# Patient Record
Sex: MALE | Race: BLACK OR AFRICAN AMERICAN | NOT HISPANIC OR LATINO | Employment: OTHER | URBAN - METROPOLITAN AREA
[De-identification: names, ages, dates, MRNs, and addresses within clinical notes are randomized per-mention and may not be internally consistent; named-entity substitution may affect disease eponyms.]

---

## 2017-03-14 ENCOUNTER — LAB VISIT (OUTPATIENT)
Dept: LAB | Facility: HOSPITAL | Age: 67
End: 2017-03-14
Attending: RADIOLOGY
Payer: MEDICARE

## 2017-03-14 DIAGNOSIS — C61 PROSTATE CANCER: ICD-10-CM

## 2017-03-14 LAB — COMPLEXED PSA SERPL-MCNC: 1.4 NG/ML

## 2017-03-14 PROCEDURE — 36415 COLL VENOUS BLD VENIPUNCTURE: CPT

## 2017-03-14 PROCEDURE — 84153 ASSAY OF PSA TOTAL: CPT

## 2017-03-21 ENCOUNTER — OFFICE VISIT (OUTPATIENT)
Dept: RADIATION ONCOLOGY | Facility: CLINIC | Age: 67
End: 2017-03-21
Payer: MEDICARE

## 2017-03-21 VITALS
SYSTOLIC BLOOD PRESSURE: 177 MMHG | HEART RATE: 70 BPM | RESPIRATION RATE: 16 BRPM | DIASTOLIC BLOOD PRESSURE: 84 MMHG | BODY MASS INDEX: 28.41 KG/M2 | WEIGHT: 181 LBS | HEIGHT: 67 IN

## 2017-03-21 DIAGNOSIS — C61 PROSTATE CANCER: Primary | ICD-10-CM

## 2017-03-21 PROCEDURE — 99999 PR PBB SHADOW E&M-EST. PATIENT-LVL III: CPT | Mod: PBBFAC,,, | Performed by: RADIOLOGY

## 2017-03-21 PROCEDURE — 1157F ADVNC CARE PLAN IN RCRD: CPT | Mod: S$GLB,,, | Performed by: RADIOLOGY

## 2017-03-21 PROCEDURE — 1159F MED LIST DOCD IN RCRD: CPT | Mod: S$GLB,,, | Performed by: RADIOLOGY

## 2017-03-21 PROCEDURE — 3079F DIAST BP 80-89 MM HG: CPT | Mod: S$GLB,,, | Performed by: RADIOLOGY

## 2017-03-21 PROCEDURE — 1126F AMNT PAIN NOTED NONE PRSNT: CPT | Mod: S$GLB,,, | Performed by: RADIOLOGY

## 2017-03-21 PROCEDURE — 1160F RVW MEDS BY RX/DR IN RCRD: CPT | Mod: S$GLB,,, | Performed by: RADIOLOGY

## 2017-03-21 PROCEDURE — 3077F SYST BP >= 140 MM HG: CPT | Mod: S$GLB,,, | Performed by: RADIOLOGY

## 2017-03-21 PROCEDURE — 99212 OFFICE O/P EST SF 10 MIN: CPT | Mod: S$GLB,,, | Performed by: RADIOLOGY

## 2017-03-21 RX ORDER — ASPIRIN 81 MG/1
81 TABLET ORAL DAILY
Status: ON HOLD | COMMUNITY
End: 2017-10-16

## 2017-03-21 RX ORDER — LISINOPRIL 40 MG/1
TABLET ORAL
Refills: 0 | Status: ON HOLD | COMMUNITY
Start: 2016-12-29 | End: 2017-10-16

## 2017-03-21 RX ORDER — RIVAROXABAN 15 MG/1
TABLET, FILM COATED ORAL
Refills: 0 | Status: ON HOLD | COMMUNITY
Start: 2016-12-23 | End: 2017-10-16

## 2017-03-21 NOTE — PROGRESS NOTES
Subjective:       Patient ID: Otis Salcido is a 67 y.o. male.    Chief Complaint: Prostate Cancer (4mo f/u;psa)    HPI Comments: This patient returns for follow up visit.      Mr. Salcido has a history of recurrent adenocarcinoma  of the prostate.  He is status post prostatectomy in May of 2011.  Pathology revealed a Latasha 7 (3+4) adenocarcinoma.  There was a tertiary Acra grade 5 noted.  There was no extra prostatic extension or seminal vesicle involvement.  Tumor was present at the distal urethral margin.  Recent PSA returned elevated at 0.17 ng/mL.  The patient was referred to our department for consideration of salvage radiation.  We subsequently enrolled the patient in a current RTOG trial 0534.  He was randomized to receive radiotherapy to the prostate bed.  He completed 68.4 Gy to the prostate bed on 11/12/13.  We have followed the patient since that time.  Initially his PSA decreased to < 0.01 ng/ml, but recently we have seen an increase in his PSA consistent with biochemical failure.  Today, the patient states he feels well.  No complaints of dysuria or hematuria.  Denies chest pain.     Review of Systems   Constitutional: Negative for activity change, appetite change, chills, fatigue and fever.   Respiratory: Negative for cough and shortness of breath.    Cardiovascular: Negative for chest pain and palpitations.   Gastrointestinal: Negative for abdominal pain, constipation and diarrhea.   Genitourinary: Negative for difficulty urinating, dysuria, frequency and hematuria.       Objective:      Physical Exam   Constitutional: He appears well-developed and well-nourished. No distress.   Abdominal: Soft. He exhibits no distension. There is no tenderness.   Genitourinary:   Genitourinary Comments: rectal - deferred.        PSA -  1.4 ng/ml  Assessment:       1. Prostate cancer        Plan:       Prostate cancer, status post salvage irradiation with increasing PSA.  His PSA is relatively stable from 1.3  in  November of 2016.  Discussed the results with the patient. Bone scan in May of 2016 was negative.  Will plan to continue active surveillance.  Plan follow up in 6 months with PSA.

## 2017-09-12 ENCOUNTER — LAB VISIT (OUTPATIENT)
Dept: LAB | Facility: HOSPITAL | Age: 67
End: 2017-09-12
Attending: RADIOLOGY
Payer: MEDICARE

## 2017-09-12 DIAGNOSIS — C61 PROSTATE CANCER: ICD-10-CM

## 2017-09-12 LAB — COMPLEXED PSA SERPL-MCNC: 3.4 NG/ML

## 2017-09-12 PROCEDURE — 36415 COLL VENOUS BLD VENIPUNCTURE: CPT

## 2017-09-12 PROCEDURE — 84153 ASSAY OF PSA TOTAL: CPT

## 2017-09-14 DIAGNOSIS — C61 PROSTATE CANCER: Primary | ICD-10-CM

## 2017-09-22 ENCOUNTER — TELEPHONE (OUTPATIENT)
Dept: RADIOLOGY | Facility: HOSPITAL | Age: 67
End: 2017-09-22

## 2017-09-25 ENCOUNTER — HOSPITAL ENCOUNTER (OUTPATIENT)
Dept: RADIOLOGY | Facility: HOSPITAL | Age: 67
Discharge: HOME | DRG: 064 | End: 2017-09-25
Attending: RADIOLOGY
Payer: MEDICARE

## 2017-09-25 DIAGNOSIS — C61 PROSTATE CANCER: ICD-10-CM

## 2017-09-25 PROCEDURE — A9503 TC99M MEDRONATE: HCPCS

## 2017-09-25 PROCEDURE — 78306 BONE IMAGING WHOLE BODY: CPT | Mod: 26,,, | Performed by: RADIOLOGY

## 2017-09-27 ENCOUNTER — HOSPITAL ENCOUNTER (INPATIENT)
Facility: HOSPITAL | Age: 67
LOS: 6 days | Discharge: NURSING, REHAB OR LONG-TERM CARE | DRG: 064 | End: 2017-10-03
Attending: EMERGENCY MEDICINE | Admitting: PSYCHIATRY & NEUROLOGY
Payer: MEDICARE

## 2017-09-27 DIAGNOSIS — I61.4 NONTRAUMATIC INTRACEREBRAL HEMORRHAGE OF CEREBELLUM, UNSPECIFIED LATERALITY: Primary | ICD-10-CM

## 2017-09-27 DIAGNOSIS — I63.9 STROKE: ICD-10-CM

## 2017-09-27 DIAGNOSIS — Z86.711 HISTORY OF PULMONARY EMBOLISM: ICD-10-CM

## 2017-09-27 DIAGNOSIS — I61.4 LEFT-SIDED NONTRAUMATIC INTRACEREBRAL HEMORRHAGE OF CEREBELLUM: ICD-10-CM

## 2017-09-27 DIAGNOSIS — I10 HYPERTENSION: ICD-10-CM

## 2017-09-27 DIAGNOSIS — I82.4Y2 ACUTE DEEP VEIN THROMBOSIS (DVT) OF PROXIMAL VEIN OF LEFT LOWER EXTREMITY: ICD-10-CM

## 2017-09-27 DIAGNOSIS — Z86.718 HISTORY OF DEEP VENOUS THROMBOSIS (DVT) OF DISTAL VEIN OF LEFT LOWER EXTREMITY: ICD-10-CM

## 2017-09-27 DIAGNOSIS — R53.83 FATIGUE: ICD-10-CM

## 2017-09-27 DIAGNOSIS — I10 HTN (HYPERTENSION): ICD-10-CM

## 2017-09-27 DIAGNOSIS — C61 PROSTATE CANCER: ICD-10-CM

## 2017-09-27 DIAGNOSIS — Z86.718 HISTORY OF BLOOD CLOTS: ICD-10-CM

## 2017-09-27 LAB
ABO + RH BLD: NORMAL
ALBUMIN SERPL BCP-MCNC: 3.6 G/DL
ALP SERPL-CCNC: 103 U/L
ALT SERPL W/O P-5'-P-CCNC: 15 U/L
ANION GAP SERPL CALC-SCNC: 11 MMOL/L
APTT BLDCRRT: 24.9 SEC
AST SERPL-CCNC: 20 U/L
BASOPHILS # BLD AUTO: 0.03 K/UL
BASOPHILS NFR BLD: 0.4 %
BILIRUB SERPL-MCNC: 0.4 MG/DL
BLD GP AB SCN CELLS X3 SERPL QL: NORMAL
BNP SERPL-MCNC: 11 PG/ML
BUN SERPL-MCNC: 7 MG/DL
CALCIUM SERPL-MCNC: 9.5 MG/DL
CHLORIDE SERPL-SCNC: 103 MMOL/L
CO2 SERPL-SCNC: 26 MMOL/L
CREAT SERPL-MCNC: 0.8 MG/DL
DIFFERENTIAL METHOD: ABNORMAL
EOSINOPHIL # BLD AUTO: 0 K/UL
EOSINOPHIL NFR BLD: 0.1 %
ERYTHROCYTE [DISTWIDTH] IN BLOOD BY AUTOMATED COUNT: 13.6 %
EST. GFR  (AFRICAN AMERICAN): >60 ML/MIN/1.73 M^2
EST. GFR  (NON AFRICAN AMERICAN): >60 ML/MIN/1.73 M^2
GLUCOSE SERPL-MCNC: 131 MG/DL
HCT VFR BLD AUTO: 41.6 %
HGB BLD-MCNC: 13.8 G/DL
INR PPP: 1
LYMPHOCYTES # BLD AUTO: 1.2 K/UL
LYMPHOCYTES NFR BLD: 16.5 %
MCH RBC QN AUTO: 28.8 PG
MCHC RBC AUTO-ENTMCNC: 33.2 G/DL
MCV RBC AUTO: 87 FL
MONOCYTES # BLD AUTO: 0.4 K/UL
MONOCYTES NFR BLD: 5.1 %
NEUTROPHILS # BLD AUTO: 5.6 K/UL
NEUTROPHILS NFR BLD: 77.5 %
PLATELET # BLD AUTO: 187 K/UL
PMV BLD AUTO: 12.1 FL
POTASSIUM SERPL-SCNC: 3.5 MMOL/L
PROT SERPL-MCNC: 8.4 G/DL
PROTHROMBIN TIME: 11 SEC
RBC # BLD AUTO: 4.8 M/UL
SODIUM SERPL-SCNC: 140 MMOL/L
TROPONIN I SERPL DL<=0.01 NG/ML-MCNC: <0.006 NG/ML
WBC # BLD AUTO: 7.23 K/UL

## 2017-09-27 PROCEDURE — 96366 THER/PROPH/DIAG IV INF ADDON: CPT

## 2017-09-27 PROCEDURE — 63600531 PHARM REV CODE 636 NO ALT 250 W HCPCS: Performed by: EMERGENCY MEDICINE

## 2017-09-27 PROCEDURE — 94761 N-INVAS EAR/PLS OXIMETRY MLT: CPT

## 2017-09-27 PROCEDURE — C9132 KCENTRA, PER I.U.: HCPCS | Performed by: EMERGENCY MEDICINE

## 2017-09-27 PROCEDURE — 96365 THER/PROPH/DIAG IV INF INIT: CPT

## 2017-09-27 PROCEDURE — 99223 1ST HOSP IP/OBS HIGH 75: CPT | Mod: ,,, | Performed by: NEUROLOGICAL SURGERY

## 2017-09-27 PROCEDURE — 99285 EMERGENCY DEPT VISIT HI MDM: CPT | Mod: 25

## 2017-09-27 PROCEDURE — G8997 SWALLOW GOAL STATUS: HCPCS | Mod: CH

## 2017-09-27 PROCEDURE — 97530 THERAPEUTIC ACTIVITIES: CPT

## 2017-09-27 PROCEDURE — 25000003 PHARM REV CODE 250: Performed by: NURSE PRACTITIONER

## 2017-09-27 PROCEDURE — 25000003 PHARM REV CODE 250: Performed by: EMERGENCY MEDICINE

## 2017-09-27 PROCEDURE — G8988 SELF CARE GOAL STATUS: HCPCS | Mod: CK

## 2017-09-27 PROCEDURE — 85730 THROMBOPLASTIN TIME PARTIAL: CPT

## 2017-09-27 PROCEDURE — 97166 OT EVAL MOD COMPLEX 45 MIN: CPT

## 2017-09-27 PROCEDURE — 63600175 PHARM REV CODE 636 W HCPCS: Performed by: NURSE PRACTITIONER

## 2017-09-27 PROCEDURE — 93010 ELECTROCARDIOGRAM REPORT: CPT | Mod: ,,, | Performed by: INTERNAL MEDICINE

## 2017-09-27 PROCEDURE — 86901 BLOOD TYPING SEROLOGIC RH(D): CPT

## 2017-09-27 PROCEDURE — 86900 BLOOD TYPING SEROLOGIC ABO: CPT

## 2017-09-27 PROCEDURE — 83880 ASSAY OF NATRIURETIC PEPTIDE: CPT

## 2017-09-27 PROCEDURE — 85025 COMPLETE CBC W/AUTO DIFF WBC: CPT

## 2017-09-27 PROCEDURE — 25000003 PHARM REV CODE 250: Performed by: PHYSICIAN ASSISTANT

## 2017-09-27 PROCEDURE — 80053 COMPREHEN METABOLIC PANEL: CPT

## 2017-09-27 PROCEDURE — 85610 PROTHROMBIN TIME: CPT

## 2017-09-27 PROCEDURE — 99223 1ST HOSP IP/OBS HIGH 75: CPT | Mod: AI,GC,, | Performed by: PSYCHIATRY & NEUROLOGY

## 2017-09-27 PROCEDURE — 99291 CRITICAL CARE FIRST HOUR: CPT | Mod: ,,, | Performed by: NURSE PRACTITIONER

## 2017-09-27 PROCEDURE — 92523 SPEECH SOUND LANG COMPREHEN: CPT

## 2017-09-27 PROCEDURE — 99285 EMERGENCY DEPT VISIT HI MDM: CPT | Mod: ,,, | Performed by: PHYSICIAN ASSISTANT

## 2017-09-27 PROCEDURE — G8987 SELF CARE CURRENT STATUS: HCPCS | Mod: CM

## 2017-09-27 PROCEDURE — 82962 GLUCOSE BLOOD TEST: CPT

## 2017-09-27 PROCEDURE — 20000000 HC ICU ROOM

## 2017-09-27 PROCEDURE — 84484 ASSAY OF TROPONIN QUANT: CPT

## 2017-09-27 PROCEDURE — 97802 MEDICAL NUTRITION INDIV IN: CPT

## 2017-09-27 PROCEDURE — G8996 SWALLOW CURRENT STATUS: HCPCS | Mod: CH

## 2017-09-27 PROCEDURE — 97162 PT EVAL MOD COMPLEX 30 MIN: CPT

## 2017-09-27 PROCEDURE — 99222 1ST HOSP IP/OBS MODERATE 55: CPT | Mod: ,,, | Performed by: NURSE PRACTITIONER

## 2017-09-27 PROCEDURE — 92610 EVALUATE SWALLOWING FUNCTION: CPT

## 2017-09-27 PROCEDURE — 93005 ELECTROCARDIOGRAM TRACING: CPT

## 2017-09-27 PROCEDURE — 96375 TX/PRO/DX INJ NEW DRUG ADDON: CPT

## 2017-09-27 RX ORDER — HYDRALAZINE HYDROCHLORIDE 50 MG/1
50 TABLET, FILM COATED ORAL EVERY 8 HOURS
Status: DISCONTINUED | OUTPATIENT
Start: 2017-09-27 | End: 2017-09-28

## 2017-09-27 RX ORDER — AMLODIPINE BESYLATE 10 MG/1
10 TABLET ORAL DAILY
Status: DISCONTINUED | OUTPATIENT
Start: 2017-09-27 | End: 2017-10-03 | Stop reason: HOSPADM

## 2017-09-27 RX ORDER — POLYETHYLENE GLYCOL 3350 17 G/17G
17 POWDER, FOR SOLUTION ORAL DAILY
Status: DISCONTINUED | OUTPATIENT
Start: 2017-09-27 | End: 2017-10-03 | Stop reason: HOSPADM

## 2017-09-27 RX ORDER — LABETALOL HYDROCHLORIDE 5 MG/ML
10 INJECTION, SOLUTION INTRAVENOUS EVERY 4 HOURS PRN
Status: DISCONTINUED | OUTPATIENT
Start: 2017-09-27 | End: 2017-09-27

## 2017-09-27 RX ORDER — AMOXICILLIN 250 MG
1 CAPSULE ORAL 2 TIMES DAILY
Status: DISCONTINUED | OUTPATIENT
Start: 2017-09-27 | End: 2017-10-03 | Stop reason: HOSPADM

## 2017-09-27 RX ORDER — ACETAMINOPHEN 325 MG/1
650 TABLET ORAL EVERY 4 HOURS PRN
Status: DISCONTINUED | OUTPATIENT
Start: 2017-09-27 | End: 2017-10-03 | Stop reason: HOSPADM

## 2017-09-27 RX ORDER — LABETALOL HYDROCHLORIDE 5 MG/ML
10 INJECTION, SOLUTION INTRAVENOUS
Status: COMPLETED | OUTPATIENT
Start: 2017-09-27 | End: 2017-09-27

## 2017-09-27 RX ORDER — HYDRALAZINE HYDROCHLORIDE 20 MG/ML
10 INJECTION INTRAMUSCULAR; INTRAVENOUS EVERY 4 HOURS PRN
Status: DISCONTINUED | OUTPATIENT
Start: 2017-09-27 | End: 2017-09-27

## 2017-09-27 RX ORDER — NICARDIPINE HYDROCHLORIDE 0.2 MG/ML
2.5 INJECTION INTRAVENOUS CONTINUOUS
Status: DISCONTINUED | OUTPATIENT
Start: 2017-09-27 | End: 2017-09-27

## 2017-09-27 RX ORDER — SODIUM CHLORIDE 9 MG/ML
INJECTION, SOLUTION INTRAVENOUS CONTINUOUS
Status: DISCONTINUED | OUTPATIENT
Start: 2017-09-27 | End: 2017-10-01

## 2017-09-27 RX ORDER — ONDANSETRON 2 MG/ML
4 INJECTION INTRAMUSCULAR; INTRAVENOUS
Status: DISPENSED | OUTPATIENT
Start: 2017-09-27 | End: 2017-09-27

## 2017-09-27 RX ORDER — LEVETIRACETAM 500 MG/1
500 TABLET ORAL 2 TIMES DAILY
Status: DISCONTINUED | OUTPATIENT
Start: 2017-09-27 | End: 2017-10-02

## 2017-09-27 RX ORDER — NICARDIPINE HYDROCHLORIDE 0.2 MG/ML
1 INJECTION INTRAVENOUS CONTINUOUS
Status: DISCONTINUED | OUTPATIENT
Start: 2017-09-27 | End: 2017-09-28

## 2017-09-27 RX ORDER — NICARDIPINE HYDROCHLORIDE 0.2 MG/ML
2.5 INJECTION INTRAVENOUS CONTINUOUS PRN
Status: DISCONTINUED | OUTPATIENT
Start: 2017-09-27 | End: 2017-09-27

## 2017-09-27 RX ORDER — LABETALOL HYDROCHLORIDE 5 MG/ML
10 INJECTION, SOLUTION INTRAVENOUS EVERY 4 HOURS PRN
Status: DISCONTINUED | OUTPATIENT
Start: 2017-09-27 | End: 2017-09-28

## 2017-09-27 RX ORDER — LISINOPRIL 20 MG/1
40 TABLET ORAL DAILY
Status: DISCONTINUED | OUTPATIENT
Start: 2017-09-27 | End: 2017-10-03 | Stop reason: HOSPADM

## 2017-09-27 RX ORDER — HYDRALAZINE HYDROCHLORIDE 20 MG/ML
10 INJECTION INTRAMUSCULAR; INTRAVENOUS EVERY 4 HOURS PRN
Status: DISCONTINUED | OUTPATIENT
Start: 2017-09-27 | End: 2017-09-28

## 2017-09-27 RX ORDER — ONDANSETRON 2 MG/ML
4 INJECTION INTRAMUSCULAR; INTRAVENOUS EVERY 12 HOURS PRN
Status: DISCONTINUED | OUTPATIENT
Start: 2017-09-27 | End: 2017-10-03 | Stop reason: HOSPADM

## 2017-09-27 RX ORDER — SODIUM CHLORIDE 0.9 % (FLUSH) 0.9 %
3 SYRINGE (ML) INJECTION EVERY 8 HOURS
Status: DISCONTINUED | OUTPATIENT
Start: 2017-09-27 | End: 2017-10-03 | Stop reason: HOSPADM

## 2017-09-27 RX ADMIN — AMLODIPINE BESYLATE 10 MG: 10 TABLET ORAL at 09:09

## 2017-09-27 RX ADMIN — HYDRALAZINE HYDROCHLORIDE 10 MG: 20 INJECTION INTRAMUSCULAR; INTRAVENOUS at 05:09

## 2017-09-27 RX ADMIN — STANDARDIZED SENNA CONCENTRATE AND DOCUSATE SODIUM 1 TABLET: 8.6; 5 TABLET, FILM COATED ORAL at 09:09

## 2017-09-27 RX ADMIN — NICARDIPINE HYDROCHLORIDE 2.5 MG/HR: 0.2 INJECTION, SOLUTION INTRAVENOUS at 06:09

## 2017-09-27 RX ADMIN — LEVETIRACETAM 500 MG: 500 TABLET ORAL at 09:09

## 2017-09-27 RX ADMIN — NICARDIPINE HYDROCHLORIDE 2.5 MG/HR: 0.2 INJECTION, SOLUTION INTRAVENOUS at 09:09

## 2017-09-27 RX ADMIN — HYDRALAZINE HYDROCHLORIDE 50 MG: 50 TABLET ORAL at 11:09

## 2017-09-27 RX ADMIN — LABETALOL HYDROCHLORIDE 10 MG: 5 INJECTION, SOLUTION INTRAVENOUS at 05:09

## 2017-09-27 RX ADMIN — LISINOPRIL 40 MG: 20 TABLET ORAL at 09:09

## 2017-09-27 RX ADMIN — ONDANSETRON 4 MG: 2 INJECTION INTRAMUSCULAR; INTRAVENOUS at 09:09

## 2017-09-27 RX ADMIN — SODIUM CHLORIDE: 0.9 INJECTION, SOLUTION INTRAVENOUS at 06:09

## 2017-09-27 RX ADMIN — Medication 3630 UNITS: at 05:09

## 2017-09-27 RX ADMIN — POLYETHYLENE GLYCOL 3350 17 G: 17 POWDER, FOR SOLUTION ORAL at 09:09

## 2017-09-27 RX ADMIN — DESMOPRESSIN ACETATE 21.78 MCG: 4 SOLUTION INTRAVENOUS at 11:09

## 2017-09-27 NOTE — HPI
66 yo M with PMHx of HTN, HLD, previous PEs on rivaroxaban presents to [facility] ED 09/27/17 with sudden onset of headache, vertigo, and nausea with multiple episodes of emesis.  CT head showing ICH approx 2.6 cm.  No falls despite vertigo.  No previous history of stroke.  Symptoms are persistent but have not worsened since onset.  Now on nicardipine gtt for BP control.

## 2017-09-27 NOTE — ASSESSMENT & PLAN NOTE
67M on xarelto for DVT with PMH HTN, prostate CA, HLD, TIAs (on ASA) who presents with cerebellar ICH (ICH score 1).    Neuro:  --continue medical management per NCC  --Q1h neuro checks  --Repeat head CT scan is stable   --HOB >30  --Call NSGY with any acute changes in neuro exam    CVS:  --SBP goals <160 on cardene (hydralazine, labetalol, amlodipine PRN)    Pulm:  --Aggressive pulmonary management  --Insentive spirometry & Duonebs PRN    Heme/onc/ID:  --CBC/CMP/Coags daily  --PCC to reverse Xarelto    F/E/GI  --Na 140-150  --Diet pending swallow eval  --PPI  --Laxatives for bowel care    Ppx:  -DVT ppx per NCC  -PPI    Dispo:  -PT/OT/OOB  -Speech eval  -Consult social work for dispo  - follow up in neurosurgery clinic in 1-2 monthswith MRI brain w/wo

## 2017-09-27 NOTE — PLAN OF CARE
Problem: SLP Goal  Goal: SLP Goal  Outcome: Ongoing (interventions implemented as appropriate)  Regular diet with thin liquids.    Maria El MA/[facility]-SLP  Speech Language Pathologist  Pager [phone]  9/27/2017

## 2017-09-27 NOTE — HOSPITAL COURSE
9/27/17:  Evaluated by therapy.  Bed mobility min-maxA.  Sit to stand maxA and transfers maxA.  UBD maxA and LBD totalA.  Passed bedside swallow evaluation.  SLP recommending regular diet and thin liquids.  Found to have cognitive-linguistic impairments.    9/28/17: Participating with therapy.  Bed mobility CGA-MaxA.  Sit to stand MaxA and transfers ModA.  UBD ModA and LBD MaxA.  9/30/17:  Participating with therapy.  Bed mobility SV-Candida.  Sit to stand CGA-Candida and transfers Candida.  Ambulated 95 ft Candida.  UBD Candida and LBD Candida.  10/2/17:  Progressing with therapy.  Bed mobility SV-CGA.  Sit to stand CGA-Candida and transfers CGA-Candida.  Ambulated 88 ft x 2 CGA-Candida.  UBD Candida.  Found to have mild cognitive-linguistic impairment.

## 2017-09-27 NOTE — ASSESSMENT & PLAN NOTE
68 yo M with PMHx HTN, HLD, PEs on rivaroxaban, and prostate cancer presents to [facility] ED 09/27/17 after sudden onset of headache, vertigo, and n/v approx 10-11 pm 09/26/17.  CT head showing 2.6 cm L cerebellar hemorrhage.  Neuro ICU and NSGY following.    Antithrombotics for secondary stroke prevention: Hold 2/2 ICH  Statins for secondary stroke prevention, HLD:  Lipid panel pending--atorvastatin 40 mg po qd  Aggressive risk factor modification: HTN, HLD, previous PEs on rivaroxaban, diet, exercise  Rehab Efforts:  PT/OT/SLP/PM&R  Diagnostics: Ordered/Pending--Hgb A1c, lipid panel, TSH, 2D Echo, MRI Brain, MRA Brain  VTE Prophylaxis: Holding 2/2 ICH--SCDs, early mobility

## 2017-09-27 NOTE — PROGRESS NOTES
Patient arrived back to unit from CT. Vitals stable. Awake, Alert, Oriented. Will continue to monitor.

## 2017-09-27 NOTE — PLAN OF CARE
SW spoke with Pt at bedside regarding therapy recs for rehab. Gave list of facilities. He will review and let this SW know asap.    Danielle Simon, JAIRO  Neurocritical Care   [facility]  [id number]

## 2017-09-27 NOTE — ASSESSMENT & PLAN NOTE
NSGY/Vascualr Surgery consult  Q1hr Neuro, SBP <140  Repeat CTH 1000  DDAVP for ASA hx  PCC for Xarelto Hx  Keppra bid ppx  PT/OT/SLP

## 2017-09-27 NOTE — CONSULTS
[facility]  Vascular Neurology  Comprehensive Stroke Center  Consult Note    Inpatient consult to Vascular (Stroke) Neurology  Consult performed by: ALFREDA HARP  Consult ordered by: NAEEM BRANHAM        Assessment/Plan:     66 yo M with PMHx of HTN, HLD, previous PEs on rivaroxaban presents to [facility] ED 09/27/17 with sudden onset of headache, vertigo, and nausea with multiple episodes of emesis.  CT head showing ICH approx 2.6 cm.  No falls despite vertigo.  No previous history of stroke.  Symptoms are persistent but have not worsened since onset.  Now on nicardipine gtt for BP control.    Nontraumatic intracerebral hemorrhage of cerebellum    66 yo M with PMHx HTN, HLD, PEs on rivaroxaban, and prostate cancer presents to [facility] ED 09/27/17 after sudden onset of headache, vertigo, and n/v approx 10-11 pm 09/26/17.  CT head showing 2.6 cm L cerebellar hemorrhage.  Neuro ICU and NSGY following.    Antithrombotics for secondary stroke prevention: Hold 2/2 ICH  Statins for secondary stroke prevention, HLD:  Lipid panel pending--atorvastatin 40 mg po qd  Aggressive risk factor modification: HTN, HLD, previous PEs on rivaroxaban, diet, exercise  Rehab Efforts:  PT/OT/SLP/PM&R  Diagnostics: Ordered/Pending--Hgb A1c, lipid panel, TSH, 2D Echo, MRI Brain, MRA Brain  VTE Prophylaxis: Holding 2/2 ICH--SCDs, early mobility      HTN (hypertension)    -Possible etiology of ICH  -Continue amlodipine 10 mg po qd  -Continue lisinopril 40 mg po qd  -Nicardipine gtt for BP control, goal SBP <140      High cholesterol    -Lipid panel pending  -Atorvastatin 40 mg po qd      History of blood clots    -Continue rivaroxaban within 2 weeks or earlier if ischemic etiology with hemorrhagic conversion vs HTN-related ICH      Prostate cancer    -Risk factor for increased clotting  -Etiology of cerebellar ICH likely hypertensive vs ischemic stroke with hemorrhagic transformation while on rivaroxaban.  Will check  2D Echo for cardioembolic etiology.     Thrombolysis Candidate? No  1. Contraindications: ICH  2. Warnings: Recent intracranial hemorrhage     Interventional Revascularization Candidate?  No; No large vessel occlusion, No; No ischemic penumbra and No; No significant neurological deficit    Research Candidate? No--> ICH    Subjective:     History of Present Illness:  68 yo M with PMHx of HTN, HLD, previous PEs on rivaroxaban presents to [place] ED 09/27/17 with sudden onset of headache, vertigo, and nausea with multiple episodes of emesis.  CT head showing ICH approx 2.6 cm.  No falls despite vertigo.  No previous history of stroke.  Symptoms are persistent but have not worsened since onset.  Now on nicardipine gtt for BP control.       Past Medical History:   Diagnosis Date    Allergy     Colon polyp     benign    Elevated PSA     Glaucoma     High cholesterol     History of blood clots     lungs    Hypertension     Kidney stone     Prostate cancer      Past Surgical History:   Procedure Laterality Date    COLON SURGERY      CYSTOSCOPY      KIDNEY STONE SURGERY      LITHOTRIPSY      NECK SURGERY      PROSTATE SURGERY       No family history on file.  Social History   Substance Use Topics    Smoking status: Current Every Day Smoker     Packs/day: 1.00     Years: 40.00     Types: Cigarettes    Smokeless tobacco: Never Used    Alcohol use Yes      Comment: social     Review of patient's allergies indicates:   Allergen Reactions    Iodine and iodide containing products Anaphylaxis     Medications: I have reviewed the current medication administration record.    Review of Systems   Constitutional: Negative for chills and fever.   Eyes: Negative for photophobia and visual disturbance.   Cardiovascular: Negative for chest pain and palpitations.   Gastrointestinal: Positive for nausea and vomiting. Negative for constipation and diarrhea.   Musculoskeletal: Negative for arthralgias and myalgias.   Skin:  Negative for rash and wound.   Neurological: Positive for dizziness and headaches. Negative for seizures, weakness and numbness.   Hematological: Negative for adenopathy. Does not bruise/bleed easily.   Psychiatric/Behavioral: Negative for agitation and confusion.     Objective:     Vital Signs (Most Recent):  Temp: 98.4 °F (36.9 °C) (09/27/17 0011)  Pulse: 71 (09/27/17 0602)  Resp: 18 (09/27/17 0552)  BP: (!) 154/78 (09/27/17 0602)  SpO2: 97 % (09/27/17 0602)    Vital Signs Range (Last 24H):  Temp:  [98.4 °F (36.9 °C)]   Pulse:  [63-84]   Resp:  [18-20]   BP: (154-194)/(77-91)   SpO2:  [95 %-99 %]     Physical Exam   Neurological: GCS eye subscore is 3 - to speech. GCS verbal subscore is 5 - oriented. GCS motor subscore is 6 - obeys commands.   General:  Well-developed, well-nourished, nad  HEENT:  NCAT, PERRLA, EOMI, oropharyngeal membranes non-erythematous/without exudate  Neck:  Supple, no palpable lad, no nuchal rigidity  Resp:  Symmetric expansion, no increased wob, CTAB  CVS:  RRR, no m/r/g.  No LE edema.  Extremities warm, well-perfused.  GI:  Abd soft, non-distended, non-tender to palpation, +BS  Neurological Exam:   Mental Status:  AAOx3.  Speech, thought content appropriate.  Recent, remote recall intact.  Cranial Nerves:  VFs intact to movement in all quadrants bilaterally.  PERRLA, EOMI.  Facial movement, sensation intact and symmetric.  Palate raises symmetrically, tongue protrudes midline.  SCM/Trapezius 5/5 bilaterally.  Motor:  Normal bulk and tone.  Strength diffusely 5/5.  Sensory:  Intact to light touch, temperature, and vibration at all extremities.  No length-dependent neuropathy detected.  Reflexes:  Biceps, brachioradialis, patellar, Achilles 2+.  No ankle clonus.  Bilateral downgoing toe.  Coordination:  Minor ataxia of L FNF, R FNF appears wnl.  DONALDO, HTS intact bilaterally.   Gait:  Deferred 2/2 fall risk, in ED, pt having leg cramping  NIH Stroke Scale:  Interval: baseline (upon  arrival/admit)  Level of Consciousness: 0 - alert  LOC Questions: 0 - answers both correctly  LOC Commands: 0 - performs both correctly  Best Gaze: 0 - normal  Visual: 0 - no visual loss  Facial Palsy: 0 - normal  Motor Left Arm: 0 - no drift  Motor Right Arm: 0 - no drift  Motor Left Le - no drift  Motor Right Le - no drift  Limb Ataxia: 0 - absent  Sensory: 0 - normal  Best Language: 0 - no aphasia  Dysarthria: 0 - normal articulation  Extinction and Inattention: 0 - no neglect  NIH Stroke Scale Total: 0  [place] Coma Scale:  Best Eye Response: 3 - to speech  Best Motor Response: 6 - obeys commands  Best Verbal Response: 5 - oriented  Gisela Coma Scale Total: 14  Modified Alicia Scale:   Timeline: Prior to symptoms onset  Modified [place] Score: 0 - no symptoms        Laboratory:  CMP:   Recent Labs  Lab 17   CALCIUM 9.5   ALBUMIN 3.6   PROT 8.4      K 3.5   CO2 26      BUN 7*   CREATININE 0.8   ALKPHOS 103   ALT 15   AST 20   BILITOT 0.4     CBC:   Recent Labs  Lab 17   WBC 7.23   RBC 4.80   HGB 13.8*   HCT 41.6      MCV 87   MCH 28.8   MCHC 33.2     Lipid Panel: PENDING  Coagulation:   Recent Labs  Lab 17   INR 1.0   APTT 24.9     Hgb A1C: PENDING  TSH: PENDING    Diagnostic Results:  17 CT head w/o contrast:  1.  2.6 cm acute hemorrhage within the superior aspect of the left cerebellar hemisphere and vermis with surrounding edema and mild associated mass effect upon the fourth ventricle.  No hydrocephalus.  This hemorrhage is likely hypertensive in etiology.    Findings identified at 4:44 AM and discussed with BOUBACAR Vogel in the emergency department at 4:45 AM.  Neurocritical care consultation recommended.  2.  Probable mild chronic microvascular ischemic changes.  Small remote lacunar infarcts in the bilateral thalami.        Ernestina Flowers MD  Comprehensive Stroke Center  Department of Vascular Neurology   [facility]

## 2017-09-27 NOTE — HPI
67-year-old male presents to the ER with his significant other for evaluation of headache, nausea, vomiting, diaphoresis.  Symptoms were acute onset last night around 10 or 11 PM.  He has never had a headache like this before in his life.  He admits to nausea with multiple episodes of emesis at home and in the waiting room.  The patient's significant other reports that throughout the evening he has not appeared himself.  He has been very lethargic and required assistance more than normal.  Patient denies any chest pain shortness of breath fever, chills

## 2017-09-27 NOTE — PLAN OF CARE
Problem: Physical Therapy Goal  Goal: Physical Therapy Goal  PT goals until 10/6/17     1. Pt supine to sit with CGA-not met  2. Pt sit to supine with CGA-not met  3. Pt sit to stand with or without RW as needed for safety with CGA-not met  4. Pt to perform gait 100ft with or without RW as needed for safety with CGA.-not met  5. Pt to transfer bed to/from bedside chair with CGA.-not met  6. Pt to perform B LE exs in sitting x 20 reps with handout.-not met    Outcome: Ongoing (interventions implemented as appropriate)  Pt's goals set and pt will benefit from skilled PT services to work towards improved functional mobility including: bed mobility, transfers, and gait.   Philly Severino, PT  9/27/2017

## 2017-09-27 NOTE — RESEARCH
I found Otis Salcido eligible to participate in the  study of microRNA expression in patients with intracerebral hemorrhage and status epilepticus. I spoke to patient on September 27, 2017 at the bedside. I explained the purpose and procedure of the study as well as the risks/benefits and cost/payment. He understood what information would be collected, why, and who could view the data. He understood that participation was voluntary and that he could withdraw at any time. He was interested in participating, and I reviewed the consent form with him. All questions were answered. He decided to participate, and he signed the consent form at time. I gave him a signed copy and placed a copy into his chart.     Radha Garcia   Associate  [facility]- Neurosurgery

## 2017-09-27 NOTE — SUBJECTIVE & OBJECTIVE
Past Medical History:   Diagnosis Date    Allergy     Colon polyp     benign    Elevated PSA     Glaucoma     High cholesterol     History of blood clots     lungs    Hypertension     Kidney stone     Prostate cancer      Past Surgical History:   Procedure Laterality Date    COLON SURGERY      CYSTOSCOPY      KIDNEY STONE SURGERY      LITHOTRIPSY      NECK SURGERY      PROSTATE SURGERY       No family history on file.  Social History   Substance Use Topics    Smoking status: Current Every Day Smoker     Packs/day: 1.00     Years: 40.00     Types: Cigarettes    Smokeless tobacco: Never Used    Alcohol use Yes      Comment: social     Review of patient's allergies indicates:   Allergen Reactions    Iodine and iodide containing products Anaphylaxis     Medications: I have reviewed the current medication administration record.    Review of Systems   Constitutional: Negative for chills and fever.   Eyes: Negative for photophobia and visual disturbance.   Cardiovascular: Negative for chest pain and palpitations.   Gastrointestinal: Positive for nausea and vomiting. Negative for constipation and diarrhea.   Musculoskeletal: Negative for arthralgias and myalgias.   Skin: Negative for rash and wound.   Neurological: Positive for dizziness and headaches. Negative for seizures, weakness and numbness.   Hematological: Negative for adenopathy. Does not bruise/bleed easily.   Psychiatric/Behavioral: Negative for agitation and confusion.     Objective:     Vital Signs (Most Recent):  Temp: 98.4 °F (36.9 °C) (09/27/17 0011)  Pulse: 71 (09/27/17 0602)  Resp: 18 (09/27/17 0552)  BP: (!) 154/78 (09/27/17 0602)  SpO2: 97 % (09/27/17 0602)    Vital Signs Range (Last 24H):  Temp:  [98.4 °F (36.9 °C)]   Pulse:  [63-84]   Resp:  [18-20]   BP: (154-194)/(77-91)   SpO2:  [95 %-99 %]     Physical Exam   Neurological: GCS eye subscore is 3 - to speech. GCS verbal subscore is 5 - oriented. GCS motor subscore is 6 - obeys  commands.   General:  Well-developed, well-nourished, nad  HEENT:  NCAT, PERRLA, EOMI, oropharyngeal membranes non-erythematous/without exudate  Neck:  Supple, no palpable lad, no nuchal rigidity  Resp:  Symmetric expansion, no increased wob, CTAB  CVS:  RRR, no m/r/g.  No LE edema.  Extremities warm, well-perfused.  GI:  Abd soft, non-distended, non-tender to palpation, +BS  Neurological Exam:   Mental Status:  AAOx3.  Speech, thought content appropriate.  Recent, remote recall intact.  Cranial Nerves:  VFs intact to movement in all quadrants bilaterally.  PERRLA, EOMI.  Facial movement, sensation intact and symmetric.  Palate raises symmetrically, tongue protrudes midline.  SCM/Trapezius 5/5 bilaterally.  Motor:  Normal bulk and tone.  Strength diffusely 5/5.  Sensory:  Intact to light touch, temperature, and vibration at all extremities.  No length-dependent neuropathy detected.  Reflexes:  Biceps, brachioradialis, patellar, Achilles 2+.  No ankle clonus.  Bilateral downgoing toe.  Coordination:  Minor ataxia of L FNF, R FNF appears wnl.  DONALDO, HTS intact bilaterally.   Gait:  Deferred 2/2 fall risk, in ED, pt having leg cramping  NIH Stroke Scale:  Interval: baseline (upon arrival/admit)  Level of Consciousness: 0 - alert  LOC Questions: 0 - answers both correctly  LOC Commands: 0 - performs both correctly  Best Gaze: 0 - normal  Visual: 0 - no visual loss  Facial Palsy: 0 - normal  Motor Left Arm: 0 - no drift  Motor Right Arm: 0 - no drift  Motor Left Le - no drift  Motor Right Le - no drift  Limb Ataxia: 0 - absent  Sensory: 0 - normal  Best Language: 0 - no aphasia  Dysarthria: 0 - normal articulation  Extinction and Inattention: 0 - no neglect  NIH Stroke Scale Total: 0  Gisela Coma Scale:  Best Eye Response: 3 - to speech  Best Motor Response: 6 - obeys commands  Best Verbal Response: 5 - oriented  [place] Coma Scale Total: 14  Modified [place] Scale:   Timeline: Prior to symptoms onset  Modified  [place] Score: 0 - no symptoms        Laboratory:  CMP:   Recent Labs  Lab 09/27/17  0421   CALCIUM 9.5   ALBUMIN 3.6   PROT 8.4      K 3.5   CO2 26      BUN 7*   CREATININE 0.8   ALKPHOS 103   ALT 15   AST 20   BILITOT 0.4     CBC:   Recent Labs  Lab 09/27/17 0421   WBC 7.23   RBC 4.80   HGB 13.8*   HCT 41.6      MCV 87   MCH 28.8   MCHC 33.2     Lipid Panel: PENDING  Coagulation:   Recent Labs  Lab 09/27/17 0421   INR 1.0   APTT 24.9     Hgb A1C: PENDING  TSH: PENDING    Diagnostic Results:  09/27/17 CT head w/o contrast:  1.  2.6 cm acute hemorrhage within the superior aspect of the left cerebellar hemisphere and vermis with surrounding edema and mild associated mass effect upon the fourth ventricle.  No hydrocephalus.  This hemorrhage is likely hypertensive in etiology.    Findings identified at 4:44 AM and discussed with BOUBACAR Vgoel in the emergency department at 4:45 AM.  Neurocritical care consultation recommended.  2.  Probable mild chronic microvascular ischemic changes.  Small remote lacunar infarcts in the bilateral thalami.

## 2017-09-27 NOTE — CONSULTS
[facility]  Physical Medicine & Rehab  Consult Note    Patient Name: Otis Salcido  MRN: [id number]  Admission Date: 9/27/2017  Hospital Length of Stay: 0 days  Attending Physician: Apollo Murphy MD   Primary Care Provider: Garrett Hicks MD     Inpatient consult to Physical Medicine & Rehabilitation  Consult performed by: Emily Cat NP  Consult requested by:  Apollo Murphy MD    Collaborating Physician: Cary Gonzalez MD  Reason for Consult:  assess rehabilitation needs    Consults  Subjective:     Principal Problem: Stroke    HPI: Otis Salcido is a 67-year-old male with PMHx of HTN, HLD, prostate cancer s/p resection, blood clots (on Xarelto), and glaucoma.  Patient [place] [facility] [place] for severe headache with associated N/V, vertigo, and diaphoresis.  On arrival, found to be hypertensive and started on Cardene infusion.  CTH showed L cerebellar ICH.  Neurosurgery and vascular neurology following.  No surgical intervention necessary at this time.  Etiology likely hypertensive vs ischemic stroke with hemorrhagic transformation while on rivaroxaban.    Functional History: Patient lives in [place], alone, in a 1st floor apartment without steps to enter.  Prior to admission, he was independent with ADLs, including driving, and mobility.  He is right handed.  DME: none.    Hospital Course: 9/27/17:  Evaluated by therapy.  Bed mobility min-maxA.  Sit to stand maxA and transfers maxA.  UBD maxA and LBD totalA.  Passed bedside swallow evaluation.  SLP recommending regular diet and thin liquids.  Found to have cognitive-linguistic impairments.      Past Medical History:   Diagnosis Date    Allergy     Colon polyp     benign    Elevated PSA     Glaucoma     High cholesterol     History of blood clots     lungs    Hypertension     Kidney stone     Prostate cancer      Past Surgical History:   Procedure Laterality Date    COLON SURGERY      CYSTOSCOPY      KIDNEY STONE  SURGERY      LITHOTRIPSY      NECK SURGERY      PROSTATE SURGERY       Review of patient's allergies indicates:   Allergen Reactions    Iodine and iodide containing products Anaphylaxis       Scheduled Medications:    amlodipine  10 mg Oral Daily    hydrALAZINE  50 mg Oral Q8H    levetiracetam  500 mg Oral BID    lisinopril  40 mg Oral Daily    ondansetron  4 mg Intravenous ED 1 Time    polyethylene glycol  17 g Oral Daily    senna-docusate 8.6-50 mg  1 tablet Oral BID    sodium chloride 0.9%  3 mL Intravenous Q8H       PRN Medications: acetaminophen, hydrALAZINE, labetalol, ondansetron    Family History     None        Social History Main Topics    Smoking status: Current Every Day Smoker     Packs/day: 1.00     Years: 40.00     Types: Cigarettes    Smokeless tobacco: Never Used    Alcohol use Yes      Comment: social    Drug use: No    Sexual activity: Yes     Partners: Female     Review of Systems   Constitutional: Negative for chills, fatigue and fever.   HENT: Negative for drooling, hearing loss, trouble swallowing and voice change.    Eyes: Negative for pain and visual disturbance.   Respiratory: Negative for cough, shortness of breath and wheezing.    Cardiovascular: Negative for chest pain and palpitations.   Gastrointestinal: Negative for abdominal pain, nausea and vomiting.   Genitourinary: Negative for difficulty urinating and flank pain.   Musculoskeletal: Positive for gait problem and myalgias. Negative for arthralgias, back pain and neck pain.   Skin: Negative for rash and wound.   Neurological: Positive for weakness. Negative for dizziness, numbness and headaches.   Psychiatric/Behavioral: Negative for agitation and hallucinations. The patient is not nervous/anxious.      Objective:     Vital Signs (Most Recent):  Temp: 98.7 °F (37.1 °C) (09/27/17 1301)  Pulse: 62 (09/27/17 1301)  Resp: 16 (09/27/17 1301)  BP: (!) 131/97 (09/27/17 1301)  SpO2: 98 % (09/27/17 1301)    Vital Signs (24h  Range):  Temp:  [98.1 °F (36.7 °C)-98.7 °F (37.1 °C)] 98.7 °F (37.1 °C)  Pulse:  [62-84] 62  Resp:  [15-22] 16  SpO2:  [95 %-99 %] 98 %  BP: (131-194)/(60-97) 131/97     Body mass index is 28.35 kg/m².    Physical Exam   Constitutional: He is oriented to person, place, and time. He appears well-developed and well-nourished. No distress.   HENT:   Head: Normocephalic and atraumatic.   Right Ear: External ear normal.   Left Ear: External ear normal.   Nose: Nose normal.   Eyes: Right eye exhibits no discharge. Left eye exhibits no discharge. No scleral icterus.   Neck: Normal range of motion.   Cardiovascular: Normal rate, regular rhythm and intact distal pulses.    Pulmonary/Chest: Effort normal. No respiratory distress. He has no wheezes.   Abdominal: Soft. He exhibits no distension. There is no tenderness.   Musculoskeletal: Normal range of motion. He exhibits no edema or tenderness.   Neurological: He is alert and oriented to person, place, and time.   -  Mental Status:  AAOx3.  Follows commands.  Answers correct age and .  Recent and remote memory intact.  -  Speech and language:  no aphasia or dysarthria.    -  Vision:  no hemianopsia or ptosis.    -  Facial movement (CN VII): symmetrical   -  Coordination:  Finger to nose exam:  RUE normal, LUE dysmetria.  -  Motor:  No pronator drift. RUE: 5/5.  LUE: 4/5.  -  Tone:  Normal.   -  Sensory:  Intact to light touch and pin prick.   Skin: Skin is warm and dry. No rash noted.   Psychiatric: He has a normal mood and affect. His behavior is normal. Thought content normal. Cognition and memory are impaired.   Vitals reviewed.    Diagnostic Results:   Labs: Reviewed  US: Reviewed  CT: Reviewed    Assessment/Plan:     Nontraumatic intracerebral hemorrhage of cerebellum    -  Presented with severe headache with associated N/V, vertigo, and diaphoresis.    -  CTH showed L cerebellar ICH.    -  Found to be hypertensive and started on Cardene infusion.    -  Neurosurgery  and vascular neurology following.  -  Etiology likely hypertensive vs ischemic stroke with hemorrhagic transformation while on rivaroxaban.    Functional status: see hospital course  Cognitive/Speech/Language status:  Cognitive-Linguistic Impairment.  Nutrition/Swallow Status:  Passed bedside swallow evaluation.  SLP recommending regular diet and thin liquids.    Recommendations  -  Encourage mobility, OOB in chair at least 3 hours per day, and early ambulation as appropriate   -  PT/OT evaluate and treat  -  SLP speech and cognitive evaluate and treat  -  Monitor sleep disturbances and establish consistent sleep-wake cycle  -  Monitor for bowel and bladder dysfunction  -  Monitor for shoulder pain and subluxation  -  Monitor for spasticity  -  Monitor for and prevent skin breakdown and pressure ulcers  · Early mobility, repositioning/weight shifting every 20-30 minutes when sitting, turn patient every 2 hours, proper mattress/overlay and chair cushioning, pressure relief/heel protector boots  -  DVT prophylaxis:  ILANA, SCD  -  Reviewed discharge options (IP rehab, SNF, HH therapy, and OP therapy)        Acute deep vein thrombosis (DVT) of proximal vein of left lower extremity    -  On home rivaroxaban, holding for ICH  -  Repeat BLE US for history of DVT        History of blood clots    -  On home rivaroxaban, holding for ICH        Patient with rehab goals.  Day 1 of admission, not ready for discharge.  Will follow and discuss with rehab team for further rehab recommendations.    Thank you for your consult.     JF Knapp  Department of Physical Medicine & Rehab  [facility]

## 2017-09-27 NOTE — SUBJECTIVE & OBJECTIVE
Past Medical History:   Diagnosis Date    Allergy     Colon polyp     benign    Elevated PSA     Glaucoma     High cholesterol     History of blood clots     lungs    Hypertension     Kidney stone     Prostate cancer      Past Surgical History:   Procedure Laterality Date    COLON SURGERY      CYSTOSCOPY      KIDNEY STONE SURGERY      LITHOTRIPSY      NECK SURGERY      PROSTATE SURGERY        No current facility-administered medications on file prior to encounter.      Current Outpatient Prescriptions on File Prior to Encounter   Medication Sig Dispense Refill    amlodipine (NORVASC) 10 MG tablet Take 1 tablet (10 mg total) by mouth once daily. 30 tablet 11    aspirin (ECOTRIN) 81 MG EC tablet Take 81 mg by mouth once daily.      lisinopril (PRINIVIL,ZESTRIL) 40 MG tablet TK 1 T PO  D  0    NASONEX 50 mcg/actuation nasal spray       sildenafil (VIAGRA) 100 MG tablet Take 1 tablet (100 mg total) by mouth daily as needed for Erectile Dysfunction. 6 tablet 5    XARELTO 15 mg Tab TK 1 T PO  QPM  0      Allergies: Iodine and iodide containing products    No family history on file.  Social History   Substance Use Topics    Smoking status: Current Every Day Smoker     Packs/day: 1.00     Years: 40.00     Types: Cigarettes    Smokeless tobacco: Never Used    Alcohol use Yes      Comment: social     Review of Systems   Respiratory: Negative for apnea, cough, choking, chest tightness, shortness of breath, wheezing and stridor.    Cardiovascular: Negative for chest pain, palpitations and leg swelling.   Gastrointestinal: Positive for nausea and vomiting. Negative for abdominal distention and abdominal pain.   Neurological: Positive for headaches.     Objective:     Vitals:  Temp: 98.4 °F (36.9 °C) (09/27/17 0011)  Pulse: 69 (09/27/17 0617)  Resp: (!) 22 (09/27/17 0617)  BP: (!) 159/72 (09/27/17 0617)  SpO2: 96 % (09/27/17 0617)    Temp:  [98.4 °F (36.9 °C)] 98.4 °F (36.9 °C)  Pulse:  [63-84] 69  Resp:   [18-22] 22  SpO2:  [95 %-99 %] 96 %  BP: (154-194)/(72-91) 159/72              No intake/output data recorded.    Physical Exam   Cardiovascular: Normal rate, regular rhythm, normal heart sounds and intact distal pulses.    Pulmonary/Chest: Effort normal and breath sounds normal.   Abdominal: Soft. Bowel sounds are normal.   Neurological:   E4 V5 M6  AAO x 4  PERRLA, EOMI  HERMAN, follows commands  Dysmetria left upper  Generalized weakness  LUE 4/5  RUE 4/5  LLE 4/5  RLE 4/5   Skin: Skin is warm and dry.       Today I personally reviewed pertinent medications, lines/drains/airways, imaging, cardiology, lab results, microbiology results,

## 2017-09-27 NOTE — PT/OT/SLP EVAL
Physical Therapy  Evaluation    Otis Salcido   MRN: [id number]   Admitting Diagnosis: <principal problem not specified>    PT Received On: 09/27/17  PT Start Time: 1328     PT Stop Time: 1352    PT Total Time (min): 24 min       Billable Minutes:  Evaluation 14 and Therapeutic Activity 10   Moderate complexity eval   Lives alone  Impaired sitting and standing balance  Requires moderate assist for bed mobility  Requires moderate assist for transfers and gait  Decreased coordination in L LE    Diagnosis: <principal problem not specified>  Pt admitted with HA, N/V, and diaphoresis. CT shows L cerebellar acute hemorrhage    Past Medical History:   Diagnosis Date    Allergy     Colon polyp     benign    Elevated PSA     Glaucoma     High cholesterol     History of blood clots     lungs    Hypertension     Kidney stone     Prostate cancer       Past Surgical History:   Procedure Laterality Date    COLON SURGERY      CYSTOSCOPY      KIDNEY STONE SURGERY      LITHOTRIPSY      NECK SURGERY      PROSTATE SURGERY     Referring physician: Ezio  Date referred to PT: 9/27/17  General Precautions: Standard, aspiration, fall  Orthopedic Precautions: N/A   Braces: N/A       Do you have any cultural, spiritual, Rastafarian conflicts, given your current situation?: none noted    Patient History:  Lives With: alone  Living Arrangements: apartment  Home Accessibility: other (see comments) (no steps to enter)  Home Layout: Able to live on 1st floor  Equipment Currently Used at Home: none    Previous Level of Function:  Ambulation Skills: independent  Transfer Skills: independent  ADL Skills: independent    Subjective:  Communicated with nurse prior to session.  Pt c/o being tired from not getting any sleep last night and tests all day    Pain/Comfort  Pain Rating 1: 0/10  Pain Rating Post-Intervention 1: 0/10    Objective:   Patient found with: peripheral IV, telemetry, pulse ox (continuous), oxygen     Cognitive  Exam:  Oriented to: Person, Place, Time and Situation    Follows Commands/attention: Follows two-step commands (slow to respond to commands/questions)  Communication: clear/fluent  Safety awareness/insight to disability: intact    Physical Exam:  Postural examination/scapula alignment: Rounded shoulder, Head forward and Abnormal trunk flexion    Sensation:   Intact  light/touch B LE pt inconsistent with naming body parts during testing     Lower Extremity Range of Motion:  Right Lower Extremity: WFL  Left Lower Extremity: WFL    Lower Extremity Strength:  Right Lower Extremity: WFL  Left Lower Extremity: Deficits: hip flex 4-/5; knee flex/ext 4/5; ankle DF 4+/5     Functional Mobility:  Bed Mobility:  Rolling/Turning Right: Minimum assistance  Supine to Sit: Moderate Assistance  Sit to Supine: Contact Guard Assistance, With siderail    Transfers:  Sit <> Stand Assistance: Maximum Assistance  Sit <> Stand Assistive Device: No Assistive Device (HHA)    Gait:   Gait Distance: 3 sidesteps to the R with HHA with moderate assist; pt with decreased clearance of L>R foot during swing phase and with flexed trunk  Assistance 1: Moderate assistance  Gait Assistive Device: Hand held assist  Gait Deviation(s): decreased gina, decreased step length, forward lean, decreased toe-to-floor clearance, decreased weight-shifting ability    Balance:   Static Sit: POOR+: Needs MINIMAL assist to maintain  Dynamic Sit: FAIR: Cannot move trunk without losing balance  Static Stand: POOR: Needs MODERATE assist to maintain    Therapeutic Activities and Exercises:  Pt sat on the EOB ~ 10 min with minimal assist due to R sided instability prior to transfer    AM-PAC 6 CLICK MOBILITY  How much help from another person does this patient currently need?   1 = Unable, Total/Dependent Assistance  2 = A lot, Maximum/Moderate Assistance  3 = A little, Minimum/Contact Guard/Supervision  4 = None, Modified [place]/Independent    Turning over in  bed (including adjusting bedclothes, sheets and blankets)?: 3  Sitting down on and standing up from a chair with arms (e.g., wheelchair, bedside commode, etc.): 2  Moving from lying on back to sitting on the side of the bed?: 2  Moving to and from a bed to a chair (including a wheelchair)?: 2  Need to walk in hospital room?: 2  Climbing 3-5 steps with a railing?: 1  Total Score: 12     AM-PAC Raw Score CMS G-Code Modifier Level of Impairment Assistance   6 % Total / Unable   7 - 9 CM 80 - 100% Maximal Assist   10 - 14 CL 60 - 80% Moderate Assist   15 - 19 CK 40 - 60% Moderate Assist   20 - 22 CJ 20 - 40% Minimal Assist   23 CI 1-20% SBA / CGA   24 CH 0% Independent/ Mod I     Patient left supine with all lines intact, call button in reach and nurse notified.    Assessment:   Otis Salcido is a 67 y.o. male with a medical diagnosis of <principal problem not specified> and presents with difficulty with functional mobility due to weakness, instability, fatigue, and decreased coordination. Pt can benefit from continued therapy to work towards improved mobility.    Rehab identified problem list/impairments: Rehab identified problem list/impairments: weakness, impaired endurance, impaired self care skills, gait instability, impaired functional mobilty, impaired coordination, impaired cognition    Rehab potential is good.    Activity tolerance: Good    Discharge recommendations: Discharge Facility/Level Of Care Needs: rehabilitation facility     Barriers to discharge: Barriers to Discharge: Decreased caregiver support (lives alone)    Equipment recommendations: Equipment Needed After Discharge:  (TBD as pt progresses with mobility)     GOALS:    Physical Therapy Goals        Problem: Physical Therapy Goal    Goal Priority Disciplines Outcome Goal Variances Interventions   Physical Therapy Goal     PT/OT, PT Ongoing (interventions implemented as appropriate)     Description:  PT goals until 10/6/17     1. Pt  supine to sit with CGA-not met  2. Pt sit to supine with CGA-not met  3. Pt sit to stand with or without RW as needed for safety with CGA-not met  4. Pt to perform gait 100ft with or without RW as needed for safety with CGA.-not met  5. Pt to transfer bed to/from bedside chair with CGA.-not met  6. Pt to perform B LE exs in sitting x 20 reps with handout.-not met                  PLAN:    Patient to be seen 5 x/week to address the above listed problems via gait training, therapeutic activities, therapeutic exercises, neuromuscular re-education  Plan of Care expires: 10/27/17  Plan of Care reviewed with: patient, spouse    Philly ANGUS Severino, PT  09/27/2017

## 2017-09-27 NOTE — ED TRIAGE NOTES
67 year old male presents to the ED via EMS c/o weakness, dizziness, diaphoresis and headache. Wife reports that patient called her into room and stated that he felt dizzy and weak and was sweating then began to vomit

## 2017-09-27 NOTE — HOSPITAL COURSE
68 yo M with PMHx of HTN, HLD, previous PEs on rivaroxaban presents to [facility] ED 09/27/17 with sudden onset of headache, vertigo, and nausea with multiple episodes of emesis.  CT head showing ICH approx 2.6 cm.  No falls despite vertigo.  No previous history of stroke.  Symptoms are persistent but have not worsened since onset.  Now on nicardipine gtt for BP control.    9/28/17 patient neurologically stable, off cardene, MRI pending, plans for stepdown  9/29 Pt neurologically stable. Plans for stepdown. Therapy recommending inpatient rehab.  10/2 Dizziness resolved per pt. Still with LUE ataxia. PT recommends rehab, pt's insurance approving SNF  10/3/14 Patient states his left sided ataxia is improving. Walking with PT. Medically ready for discharge and accepted to [facility] today

## 2017-09-27 NOTE — ASSESSMENT & PLAN NOTE
-  Presented with severe headache with associated N/V, vertigo, and diaphoresis.    -  CTH showed L cerebellar ICH.    -  Found to be hypertensive and started on Cardene infusion.    -  Neurosurgery and vascular neurology following.  -  Etiology likely hypertensive vs ischemic stroke with hemorrhagic transformation while on rivaroxaban.    Functional status: see hospital course  Cognitive/Speech/Language status:  Cognitive-Linguistic Impairment.  Nutrition/Swallow Status:  Passed bedside swallow evaluation.  SLP recommending regular diet and thin liquids.    Recommendations  -  Encourage mobility, OOB in chair at least 3 hours per day, and early ambulation as appropriate   -  PT/OT evaluate and treat  -  SLP speech and cognitive evaluate and treat  -  Monitor sleep disturbances and establish consistent sleep-wake cycle  -  Monitor for bowel and bladder dysfunction  -  Monitor for shoulder pain and subluxation  -  Monitor for spasticity  -  Monitor for and prevent skin breakdown and pressure ulcers  · Early mobility, repositioning/weight shifting every 20-30 minutes when sitting, turn patient every 2 hours, proper mattress/overlay and chair cushioning, pressure relief/heel protector boots  -  DVT prophylaxis:  ILANA, SCD  -  Reviewed discharge options (IP rehab, SNF, HH therapy, and OP therapy)

## 2017-09-27 NOTE — HOSPITAL COURSE
9/27: Patient presented to the ED with Cerebellar ICH with ICH score 1, was admitted to [facility] for ICH MAMADOU ortiz following  9/28: No acute events overnight. Repeat CT scans stable. Neuro exam stable.   9/29: NAEON.  9/30: NAEON. Neuro-stable

## 2017-09-27 NOTE — ASSESSMENT & PLAN NOTE
67M on xarelto for DVT with PMH HTN, prostate CA, HLD, TIAs (on ASA) who presents with cerebellar ICH.    Neuro:  --Admit to NCC  --Q1h neuro checks  --re-CTH at 10AM   -Consider MRI if suspicious  --HOB >30  --Call NSGY with any acute changes in neuro exam    CVS:  --SBP goals <160 on cardene (hydralazine, labetalol, amlodipine PRN)    Pulm:  --Aggressive pulmonary management  --Insentive spirometry & Duonebs PRN    Heme/onc/ID:  --CBC/CMP/Coags daily  --PCC to reverse Xarelto    F/E/GI  --Na 140-150  --Diet pending swallow eval  --PPI  --Laxatives for bowel care    Ppx:  -DVT ppx per NCC  -PPI    Dispo:  -PT/OT/OOB  -Speech eval  -Consult social work for dispo

## 2017-09-27 NOTE — HPI
Otis Salcido is a 67-year-old male with PMHx of HTN, HLD, prostate cancer s/p resection, blood clots (on Xarelto), and glaucoma.  Patient presented to [facility] on 9/27/17 for severe headache with associated N/V, vertigo, and diaphoresis.  On arrival, found to be hypertensive and started on Cardene infusion.  CTH showed L cerebellar ICH.  Neurosurgery and vascular neurology following.  No surgical intervention necessary at this time.  Etiology likely hypertensive vs ischemic stroke with hemorrhagic transformation while on rivaroxaban. US LLE negative for DVT. MRI from 9/28 revealed acute L ICH. May performed contrast MRI.     Functional History: Patient lives in [place], alone, in a 1st floor apartment without steps to enter.  Prior to admission, he was independent with ADLs, including driving, and mobility.  He is right handed.  DME: none.

## 2017-09-27 NOTE — HOSPITAL COURSE
9/27 Admit NCC, s/p left cerebellar hemorrhage  9/28: KAYY, neuro exam stable, ready for step down   9/29: KAYY, patient waiting for room to open on step down unit

## 2017-09-27 NOTE — HPI
67M with PMH of DVTs (on Xarelto), TIA (on ASA 81), HTN, HLD who presented to the ED for HA with nausea and vomiting. On presentation he demonstrated dysmetria L>RUEs, but was otherwise intact, AOx3, GCS15. CTH shows cerebellar ICH with ICH score of 1. He will be admitted to the [facility] for further management with NSGY following.

## 2017-09-27 NOTE — ASSESSMENT & PLAN NOTE
-Possible etiology of ICH  -Continue amlodipine 10 mg po qd  -Continue lisinopril 40 mg po qd  -Nicardipine gtt for BP control, goal SBP <140

## 2017-09-27 NOTE — PT/OT/SLP EVAL
"Occupational Therapy  Evaluation/Treatment    Otis Salcido   MRN: [id number]   Admitting Diagnosis:     OT Date of Treatment: 09/27/17   OT Start Time: 0554  OT Stop Time: 0617  OT Total Time (min): 23 min    Billable Minutes:  Evaluation 15  Therapeutic Activity 8    Diagnosis:ICH    Past Medical History:   Diagnosis Date    Allergy     Colon polyp     benign    Elevated PSA     Glaucoma     High cholesterol     History of blood clots     lungs    Hypertension     Kidney stone     Prostate cancer       Past Surgical History:   Procedure Laterality Date    COLON SURGERY      CYSTOSCOPY      KIDNEY STONE SURGERY      LITHOTRIPSY      NECK SURGERY      PROSTATE SURGERY         Referring physician: Ezio  Date referred to OT: 9/27  General Precautions: Standard, aspiration, fall, NPO  Orthopedic Precautions: N/A  Braces: N/A    Do you have any cultural, spiritual, Congregational conflicts, given your current situation?: Presybeterian     Patient History:   Prior level of function:   Per patient:  Patient resides alone in [place] in first floor apt with no steps to enter.  PTA patient independent with ADLs including driving.  Currently owns no DME.  Patient is right handed.  Retired:   at a plant.  Hobbies:  sports, TV.  Roles/Responsibilities:  father, grandfather, cooking, grocery shopping, managing finances.     Subjective:  Communicated with nurse prior to session.  Patient:  "I lost my balance in my legs and couldn't walk."  Girlfriend:  "He got dizzy."  Pain/Comfort  Pain Rating 1: 0/10  Pain Rating Post-Intervention 1: 0/10    Objective:  Patient found with: peripheral IV, telemetry  Girlfriend present.    Cognitive Exam:  Oriented to: Person, Place and Time  Follows Commands/attention: Follows one-step commands  Communication: delayed responses  Memory: needs further assessment  Coping skills/emotional control: Appropriate to situation    Visual/perceptual:  Wears reading glasses    Physical " Exam:  Postural examination/scapula alignment: Rounded shoulder  Skin integrity: Visible skin intact  Edema: None noted     Sensation:   Intact    Upper Extremity Range of Motion:  Right Upper Extremity: WNL  Left Upper Extremity: WNL    Upper Extremity Strength:  Right Upper Extremity: WNL  Left Upper Extremity: WNL    Fine motor coordination:   Left UE dysmetria    Functional Mobility:  Bed Mobility:  Rolling/Turning to Left: Minimum assistance  Rolling/Turning Right: Minimum assistance  Scooting/Bridging: Moderate Assistance  Supine to Sit: Maximum Assistance (from the left side)  Sit to Supine: Maximum Assistance    Transfers:  Sit <> Stand Assistance: Maximum Assistance  Sit <> Stand Assistive Device: No Assistive Device  Bed <> Chair Technique: Stand Pivot  Bed <> Chair Transfer Assistance: Maximum Assistance    Activities of Daily Living:  Feeding Level of Assistance:  (NPO)  UE Dressing Level of Assistance: Maximum assistance (while seated EOB)  LE Dressing Level of Assistance: Total assistance (while seated EOB)  Grooming Position: Standing  Grooming Level of Assistance: Maximum assistance     Additional Treatment:   Patient education provided for stroke warning signs, prevention guidelines and personal risk factors.  Patienty verbalizing understanding via teach back method.   Patient education provided on role of OT and need for rehab upon discharge.  Patient verbalizing understanding via teach back method. Patient and family instructed on need to call for assistance for toileting needs and when getting up.  Continued education, patient/ family training recommended.  Patient alert and oriented x 3; able to follow 4/4 one step commands.  Patient attentive and interactive throughout the session.  Patient able to identify 3/3 body parts.  Able to name 3/3 objects.  Able to sequence 7/7 days of the week and 12/12 months of the year.  Patient's functional status and disposition recommendation discussed with  "nsg, patient and MD.  White board updated in patient's room.  OT asked if there were any other questions; patient/ family had no further questions.       AM-PAC 6 CLICK ADL  How much help from another person does this patient currently need?  1 = Unable, Total/Dependent Assistance  2 = A lot, Maximum/Moderate Assistance  3 = A little, Minimum/Contact Guard/Supervision  4 = None, Modified [place]/Independent    Putting on and taking off regular lower body clothing? : 1  Bathing (including washing, rinsing, drying)?: 2  Toileting, which includes using toilet, bedpan, or urinal? : 2  Putting on and taking off regular upper body clothing?: 2  Taking care of personal grooming such as brushing teeth?: 2  Eating meals?: 1  Total Score: 10    AM-PAC Raw Score CMS "G-Code Modifier Level of Impairment Assistance   6 % Total / Unable   7 - 9 CM 80 - 100% Maximal Assist   10-14 CL 60 - 80% Moderate Assist   15 - 19 CK 40 - 60% Moderate Assist   20 - 22 CJ 20 - 40% Minimal Assist   23 CI 1-20% SBA / CGA   24 CH 0% Independent/ Mod I       Patient left supine with all lines intact and call button in reach    Assessment:  Otis Salcido is a 67 y.o. male with a medical diagnosis of ICH and presents with performance deficits of physical skills including impaired balance, mobility, dexterity, fine motor coordination, gross motor coordination, and endurance; demonstrating performance deficits of cognitive skills including impaired  attention, problem solving, sequencing and memory all resulting in inability organizing occupational performance in a timely and safe manner; demonstrating performance deficits of psychosocial skills including impairments of interpersonal interactions and coping strategies which are skills necessary to successfully and appropriately participate in everyday tasks and social situations.  These performance deficits have resulted in activity limitations including but not limited to:   bed " mobility, transfers, ascending/ descending stairs, walking short and long distances, walking around obstacles, transitional movement patterns (kneeling, bending); eating, upper body dressing, lower body dressing, brushing teeth, toileting, bathing, carrying objects, typing, writing, reading, balancing checkbook, shopping, meal preparation, fishing and hunting.   Patient's role as father, grandfather, and independent caretaker for self has been affected. Patient will benefit from skilled OT services to maximize level of independence with self-care skills and functional mobility.  Will benefit from Rehab.    Pt evaluation falls under moderate complexity for evaluation coding due to identification of 3-5 performance deficits noted as stated above. Eval required Min/Mod assistance to complete on this date and detailed assessment(s) were utilized. Moreover, an expanded review of history and occupational profile obtained with additional review of cognitive, physical and psychosocial hx.     Rehab identified problem list/impairments: Rehab identified problem list/impairments: weakness, impaired self care skills, impaired balance, decreased coordination, decreased safety awareness, decreased ROM, impaired endurance, impaired functional mobilty, impaired coordination, gait instability, impaired cognition    Rehab potential is good.    Activity tolerance: Good    Discharge recommendations: Discharge Facility/Level Of Care Needs: rehabilitation facility (pending progress or deterioration in medical status)     Barriers to discharge: Barriers to Discharge: Inaccessible home environment, Decreased caregiver support    Equipment recommendations:  (TBD closer to D/C)     GOALS:    Occupational Therapy Goals        Problem: Occupational Therapy Goal    Goal Priority Disciplines Outcome Interventions   Occupational Therapy Goal     OT, PT/OT     Description:  Goals set 9/27 to be addressed for 7 days with expiration date,  10/4:  Patient will increase functional independence with ADLs by performing:    Patient will demonstrate rolling to the right with SBA assist.  Not met   Patient will demonstrate rolling to the left with SBA assist.   Not met  Patient will demonstrate supine -sit with SBA  assist.   Not met  Patient will demonstrate stand pivot transfers with min assist.   Not met  Patient will demonstrate grooming while standing with min assist.   Not met  Patient will demonstrate upper body dressing with SBA assist while seated EOB.   Not met  Patient will demonstrate lower body dressing with min assist while seated EOB.   Not met  Patient will demonstrate toileting with min assist.   Not met  Patient's family / caregiver will demonstrate independence and safety with assisting patient with self-care skills and functional mobility.     Not met  Patient and/or patient's family will verbalize understanding of stroke prevention guidelines, personal risk factors and stroke warning signs via teachback method.  Not met                           PLAN:  Patient to be seen 6 x/week to address the above listed problems via self-care/home management, neuromuscular re-education, therapeutic activities, therapeutic exercises, sensory integration  Plan of Care expires: 10/26/17  Plan of Care reviewed with: patient, significant other    OT G-codes  Functional Assessment Tool Used: FIM  Score: 2  Functional Limitation: Self care (Grooming standing)  Self Care Current Status (): CM  Self Care Goal Status (): JASMINA Foote  09/27/2017

## 2017-09-27 NOTE — SUBJECTIVE & OBJECTIVE
Past Medical History:   Diagnosis Date    Allergy     Colon polyp     benign    Elevated PSA     Glaucoma     High cholesterol     History of blood clots     lungs    Hypertension     Kidney stone     Prostate cancer      Past Surgical History:   Procedure Laterality Date    COLON SURGERY      CYSTOSCOPY      KIDNEY STONE SURGERY      LITHOTRIPSY      NECK SURGERY      PROSTATE SURGERY       Review of patient's allergies indicates:   Allergen Reactions    Iodine and iodide containing products Anaphylaxis       Scheduled Medications:    amlodipine  10 mg Oral Daily    hydrALAZINE  50 mg Oral Q8H    levetiracetam  500 mg Oral BID    lisinopril  40 mg Oral Daily    ondansetron  4 mg Intravenous ED 1 Time    polyethylene glycol  17 g Oral Daily    senna-docusate 8.6-50 mg  1 tablet Oral BID    sodium chloride 0.9%  3 mL Intravenous Q8H       PRN Medications: acetaminophen, hydrALAZINE, labetalol, ondansetron    Family History     None        Social History Main Topics    Smoking status: Current Every Day Smoker     Packs/day: 1.00     Years: 40.00     Types: Cigarettes    Smokeless tobacco: Never Used    Alcohol use Yes      Comment: social    Drug use: No    Sexual activity: Yes     Partners: Female     Review of Systems   Constitutional: Negative for chills, fatigue and fever.   HENT: Negative for drooling, hearing loss, trouble swallowing and voice change.    Eyes: Negative for pain and visual disturbance.   Respiratory: Negative for cough, shortness of breath and wheezing.    Cardiovascular: Negative for chest pain and palpitations.   Gastrointestinal: Negative for abdominal pain, nausea and vomiting.   Genitourinary: Negative for difficulty urinating and flank pain.   Musculoskeletal: Positive for gait problem and myalgias. Negative for arthralgias, back pain and neck pain.   Skin: Negative for rash and wound.   Neurological: Positive for weakness. Negative for dizziness, numbness and  headaches.   Psychiatric/Behavioral: Negative for agitation and hallucinations. The patient is not nervous/anxious.      Objective:     Vital Signs (Most Recent):  Temp: 98.7 °F (37.1 °C) (17 1301)  Pulse: 62 (17 1301)  Resp: 16 (17 1301)  BP: (!) 131/97 (17 1301)  SpO2: 98 % (17 1301)    Vital Signs (24h Range):  Temp:  [98.1 °F (36.7 °C)-98.7 °F (37.1 °C)] 98.7 °F (37.1 °C)  Pulse:  [62-84] 62  Resp:  [15-22] 16  SpO2:  [95 %-99 %] 98 %  BP: (131-194)/(60-97) 131/97     Body mass index is 28.35 kg/m².    Physical Exam   Constitutional: He is oriented to person, place, and time. He appears well-developed and well-nourished. No distress.   HENT:   Head: Normocephalic and atraumatic.   Right Ear: External ear normal.   Left Ear: External ear normal.   Nose: Nose normal.   Eyes: Right eye exhibits no discharge. Left eye exhibits no discharge. No scleral icterus.   Neck: Normal range of motion.   Cardiovascular: Normal rate, regular rhythm and intact distal pulses.    Pulmonary/Chest: Effort normal. No respiratory distress. He has no wheezes.   Abdominal: Soft. He exhibits no distension. There is no tenderness.   Musculoskeletal: Normal range of motion. He exhibits no edema or tenderness.   Neurological: He is alert and oriented to person, place, and time.   -  Mental Status:  AAOx3.  Follows commands.  Answers correct age and .  Recent and remote memory intact.  -  Speech and language:  no aphasia or dysarthria.    -  Vision:  no hemianopsia or ptosis.    -  Facial movement (CN VII): symmetrical   -  Coordination:  Finger to nose exam:  RUE normal, LUE dysmetria.  -  Motor:  No pronator drift. RUE: 5/5.  LUE: 4/5.  -  Tone:  Normal.   -  Sensory:  Intact to light touch and pin prick.   Skin: Skin is warm and dry. No rash noted.   Psychiatric: He has a normal mood and affect. His behavior is normal. Thought content normal. Cognition and memory are impaired.   Vitals  reviewed.    NEUROLOGICAL EXAMINATION:     MENTAL STATUS   Oriented to person, place, and time.       Diagnostic Results:   Labs: Reviewed  US: Reviewed  CT: Reviewed

## 2017-09-27 NOTE — PLAN OF CARE
09/27/17 1545   Discharge Assessment   Assessment Type Discharge Planning Assessment   Confirmed/corrected address and phone number on facesheet? Yes   Assessment information obtained from? Patient   Expected Length of Stay (days) 3   Communicated expected length of stay with patient/caregiver yes   Prior to hospitilization cognitive status: Alert/Oriented   Prior to hospitalization functional status: Independent   Current cognitive status: Alert/Oriented   Current Functional Status: Needs Assistance   Lives With alone   Able to Return to Prior Arrangements unable to determine at this time (comments)   Is patient able to care for self after discharge? Unable to determine at this time (comments)   Who are your caregiver(s) and their phone number(s)? Juan Salcido (son) [phone]   Patient's perception of discharge disposition rehab facility   Readmission Within The Last 30 Days no previous admission in last 30 days   Patient currently being followed by outpatient case management? No   Patient currently receives any other outside agency services? No   Equipment Currently Used at Home none   Do you have any problems affording any of your prescribed medications? No   Is the patient taking medications as prescribed? yes   Does the patient have transportation home? No   Does the patient receive services at the [facility]? No   Discharge Plan A Rehab   Discharge Plan B Home Health;Home   Patient/Family In Agreement With Plan yes           Discharge/ My Health Packet Folder Given to patient/family:      Yes        PCP:    Patient stated that he changed his PCP yesterday and cannot remember the name of his new MD      Pharmacy:    [facility] [id number] AT [address]  [place] [address]  Phone: [phone] Fax: [phone]        Emergency Contacts:  Extended Emergency Contact Information  Primary Emergency Contact:  Joselo Castaneda   [place]  Mobile Phone: [phone]  Relation: Son      Insurance:  Payor: [employer] / Plan: [employer] / Product Type: Medicare Advantage /       Kristie Sanders RN, CCRN-K, [facility]  Neuro-Critical Care   X [phone]

## 2017-09-27 NOTE — CONSULTS
[facility]  Adult Nutrition  Consult Note    SUMMARY     Recommendations    Recommendation/Intervention:   1. Continue Cardiac diet   2. If po intake <50% of meals, add Boost TID to increase caloric intake.     RD to monitor.      Goals: Pt to tolerate >50% of meals.  Nutrition Goal Status: new  Communication of RD Recs: reviewed with RN    Reason for Assessment    Reason for Assessment: nurse/nurse practitioner consult  Diagnosis: hemorrhage  Relevent Medical History: HTN, HLD, prostate cancer         General Information Comments: Pt reports 12lb weight loss over past few weeks related to decreased intake from stress after his wife's passing. However, per chart review, pt with stable weight over last 2 years.    Nutrition Discharge Planning: adequate po intake for optimal nutrition    Nutrition Prescription Ordered    Current Diet Order: Cardiac     Evaluation of Received Nutrients/Fluid Intake        IV Fluid (mL): 1800      % Intake of Estimated Energy Needs: Other: BESSIE. No meal consumed at time of visit.  % Meal Intake: Other: BESSIE. No meal consumed at time of visit.    Nutrition Risk Screen     Nutrition Risk Screen: no indicators present    Nutrition/Diet History       Typical Food/Fluid Intake: Lunch at bedside untouched. RD assisted pt with meal set up and encouraged pt to increase intake at home with high caloric beverages if energy low. Pt verbalized understanding.  Food Preferences: No Denominational/cultural food preferences identifed at this time.         Factors Affecting Nutritional Intake: other (see comments) (None at this time)                Labs/Tests/Procedures/Meds       Pertinent Labs Reviewed: reviewed  Pertinent Labs Comments: noted  Pertinent Medications Reviewed: reviewed  Pertinent Medications Comments: IVF, cardene    Physical Findings    Overall Physical Appearance: nourished, lethargic, on oxygen therapy        Skin: intact    Anthropometrics    Temp: 98.7 °F (37.1  "°C)     Height: 5' 7" (170.2 cm)  Weight Method: Bed Scale  Weight: 82.1 kg (181 lb)  Ideal Body Weight (IBW), Male: 148 lb     % Ideal Body Weight, Male (lb): 122.3 lb     BMI (Calculated): 28.4  BMI Grade: 25 - 29.9 - overweight                            Estimated/Assessed Needs    Weight Used For Calorie Calculations: 82.1 kg (181 lb)      Energy Calorie Requirements (kcal): 1930  Energy Need Method: Dodge-St Jeor (PAL 1.25)        RMR (Dodge-St. Jeor Equation): 1544.62        Weight Used For Protein Calculations: 82.1 kg (181 lb)  Protein Requirements: 82-99g (1.0-1.2g/kg)    Fluid Requirements (mL): 1mL/kcal or per MD           RDA Method (mL): 1930               Assessment and Plan    No nutrition diagnosis at this time.      Monitor and Evaluation    Food and Nutrient Intake: energy intake, food and beverage intake  Food and Nutrient Adminstration: diet order        Anthropometric Measurements: weight, body mass index, weight change  Biochemical Data, Medical Tests and Procedures: electrolyte and renal panel, gastrointestinal profile, glucose/endocrine profile, inflammatory profile, lipid profile  Nutrition-Focused Physical Findings: overall appearance    Nutrition Risk    Level of Risk: other (see comments) (f/u 1x/week)    Nutrition Follow-Up    RD Follow-up?: Yes          "

## 2017-09-27 NOTE — H&P
[facility]  Neurocritical Care  History & Physical    Admit Date: 9/27/2017  Service Date: 09/27/2017  Length of Stay: 0    Subjective:     Chief Complaint: <principal problem not specified>    History of Present Illness: 67-year-old male presents to the ER with his significant other for evaluation of headache, nausea, vomiting, diaphoresis.  Symptoms were acute onset last night around 10 or 11 PM.  He has never had a headache like this before in his life.  He admits to nausea with multiple episodes of emesis at home and in the waiting room.  The patient's significant other reports that throughout the evening he has not appeared himself.  He has been very lethargic and required assistance more than normal.  Patient denies any chest pain shortness of breath fever, chills    Past Medical History:   Diagnosis Date    Allergy     Colon polyp     benign    Elevated PSA     Glaucoma     High cholesterol     History of blood clots     lungs    Hypertension     Kidney stone     Prostate cancer      Past Surgical History:   Procedure Laterality Date    COLON SURGERY      CYSTOSCOPY      KIDNEY STONE SURGERY      LITHOTRIPSY      NECK SURGERY      PROSTATE SURGERY        No current facility-administered medications on file prior to encounter.      Current Outpatient Prescriptions on File Prior to Encounter   Medication Sig Dispense Refill    amlodipine (NORVASC) 10 MG tablet Take 1 tablet (10 mg total) by mouth once daily. 30 tablet 11    aspirin (ECOTRIN) 81 MG EC tablet Take 81 mg by mouth once daily.      lisinopril (PRINIVIL,ZESTRIL) 40 MG tablet TK 1 T PO  D  0    NASONEX 50 mcg/actuation nasal spray       sildenafil (VIAGRA) 100 MG tablet Take 1 tablet (100 mg total) by mouth daily as needed for Erectile Dysfunction. 6 tablet 5    XARELTO 15 mg Tab TK 1 T PO  QPM  0      Allergies: Iodine and iodide containing products    No family history on file.  Social History   Substance Use  Topics    Smoking status: Current Every Day Smoker     Packs/day: 1.00     Years: 40.00     Types: Cigarettes    Smokeless tobacco: Never Used    Alcohol use Yes      Comment: social     Review of Systems   Respiratory: Negative for apnea, cough, choking, chest tightness, shortness of breath, wheezing and stridor.    Cardiovascular: Negative for chest pain, palpitations and leg swelling.   Gastrointestinal: Positive for nausea and vomiting. Negative for abdominal distention and abdominal pain.   Neurological: Positive for headaches.     Objective:     Vitals:  Temp: 98.4 °F (36.9 °C) (09/27/17 0011)  Pulse: 69 (09/27/17 0617)  Resp: (!) 22 (09/27/17 0617)  BP: (!) 159/72 (09/27/17 0617)  SpO2: 96 % (09/27/17 0617)    Temp:  [98.4 °F (36.9 °C)] 98.4 °F (36.9 °C)  Pulse:  [63-84] 69  Resp:  [18-22] 22  SpO2:  [95 %-99 %] 96 %  BP: (154-194)/(72-91) 159/72              No intake/output data recorded.    Physical Exam   Cardiovascular: Normal rate, regular rhythm, normal heart sounds and intact distal pulses.    Pulmonary/Chest: Effort normal and breath sounds normal.   Abdominal: Soft. Bowel sounds are normal.   Neurological:   E4 V5 M6  AAO x 4  PERRLA, EOMI  HERMAN, follows commands  Dysmetria left upper  Generalized weakness  LUE 4/5  RUE 4/5  LLE 4/5  RLE 4/5   Skin: Skin is warm and dry.       Today I personally reviewed pertinent medications, lines/drains/airways, imaging, cardiology, lab results, microbiology results,      Assessment/Plan:     Neuro   Nontraumatic intracerebral hemorrhage of cerebellum    NSGY/Vascualr Surgery consult  Q1hr Neuro, SBP <140  Repeat CTH 1000  DDAVP for ASA hx  PCC for Xarelto Hx  Keppra bid ppx  PT/OT/SLP        Cardiac/Vascular   HTN (hypertension)    SBP <140  Cardene/labetolol/hydralazine PRN  EKG/Echo pending  Resume Home meds        Hematology   Acute deep vein thrombosis (DVT) of proximal vein of left lower extremity    Repeat LE US, hx of DVT  Xarelto held with ICH             Prophylaxis:  Venous Thromboembolism: mechanical  Stress Ulcer: None  Ventilator Pneumonia: not applicable     Activity Orders          None        Full Code   Critical Care: 36min    Chico Holguin NP  Neurocritical Care  [facility]

## 2017-09-27 NOTE — ED NOTES
Patient only able to stand with max assist, and minimally able to ambulate. Continues to vomit, charge nurse informed and patient moved to room with monitor

## 2017-09-27 NOTE — CONSULTS
[facility]  Neurosurgery  Consult Note    Consults  Subjective:     Chief Complaint/Reason for Admission: HA with Cerebellar ICH    History of Present Illness: 67M with PMH of DVTs (on Xarelto), TIA (on ASA 81), HTN, HLD who presented to the ED for HA with nausea and vomiting. On presentation he demonstrated dysmetria L>RUEs, but was otherwise intact, AOx3, GCS15. CTH shows cerebellar ICH with ICH score of 1. He will be admitted to the [facility] for further management with NSGY following.    Prescriptions Prior to Admission   Medication Sig Dispense Refill Last Dose    amlodipine (NORVASC) 10 MG tablet Take 1 tablet (10 mg total) by mouth once daily. 30 tablet 11 11/22/2016    aspirin (ECOTRIN) 81 MG EC tablet Take 81 mg by mouth once daily.   Taking    lisinopril (PRINIVIL,ZESTRIL) 40 MG tablet TK 1 T PO  D  0     NASONEX 50 mcg/actuation nasal spray    Taking    sildenafil (VIAGRA) 100 MG tablet Take 1 tablet (100 mg total) by mouth daily as needed for Erectile Dysfunction. 6 tablet 5 Taking    XARELTO 15 mg Tab TK 1 T PO  QPM  0        Review of patient's allergies indicates:   Allergen Reactions    Iodine and iodide containing products Anaphylaxis       Past Medical History:   Diagnosis Date    Allergy     Colon polyp     benign    Elevated PSA     Glaucoma     High cholesterol     History of blood clots     lungs    Hypertension     Kidney stone     Prostate cancer      Past Surgical History:   Procedure Laterality Date    COLON SURGERY      CYSTOSCOPY      KIDNEY STONE SURGERY      LITHOTRIPSY      NECK SURGERY      PROSTATE SURGERY       Family History     None        Social History Main Topics    Smoking status: Current Every Day Smoker     Packs/day: 1.00     Years: 40.00     Types: Cigarettes    Smokeless tobacco: Never Used    Alcohol use Yes      Comment: social    Drug use: No    Sexual activity: Yes     Partners: Female     Review of Systems   Constitutional:  Negative for activity change, chills, diaphoresis, fatigue and fever.   HENT: Negative for tinnitus, trouble swallowing and voice change.    Eyes: Negative for photophobia and visual disturbance.   Respiratory: Negative for cough, choking, chest tightness, shortness of breath, wheezing and stridor.    Cardiovascular: Negative for chest pain, palpitations and leg swelling.   Gastrointestinal: Positive for nausea and vomiting. Negative for abdominal distention, abdominal pain, constipation and diarrhea.   Endocrine: Negative for polydipsia, polyphagia and polyuria.   Genitourinary: Negative for dysuria, frequency, hematuria and urgency.   Musculoskeletal: Positive for gait problem. Negative for back pain, neck pain and neck stiffness.   Skin: Negative for color change, pallor, rash and wound.   Neurological: Positive for headaches. Negative for dizziness, tremors, seizures, syncope, facial asymmetry, speech difficulty, weakness, light-headedness and numbness.   Hematological: Negative for adenopathy. Does not bruise/bleed easily.   Psychiatric/Behavioral: Negative for agitation, behavioral problems and confusion.     Objective:     Weight: 72.6 kg (160 lb)  Body mass index is 25.06 kg/m².  Vital Signs (Most Recent):  Temp: 98.4 °F (36.9 °C) (09/27/17 0011)  Pulse: 77 (09/27/17 0801)  Resp: 18 (09/27/17 0801)  BP: (!) 149/67 (09/27/17 0801)  SpO2: 95 % (09/27/17 0801) Vital Signs (24h Range):  Temp:  [98.4 °F (36.9 °C)] 98.4 °F (36.9 °C)  Pulse:  [63-84] 77  Resp:  [16-22] 18  SpO2:  [95 %-99 %] 95 %  BP: (134-194)/(60-91) 149/67                           Physical Exam:  Nursing note and vitals reviewed.    Constitutional: He appears well-developed and well-nourished.     Eyes: Pupils are equal, round, and reactive to light. Conjunctivae and EOM are normal.     Cardiovascular: Normal rate, regular rhythm and normal pulses.     Abdominal: Soft. Bowel sounds are normal.     Psych/Behavior: He is alert. He is oriented to  person, place, and time. He has a normal mood and affect.     Neurosurgery exam:  General: AOx3; GCS 15  CNII-XII: Fine exam of CN is intact; PERRL; No pronator drift  Extremities: 5/5 motor in all distributions; sensorium intact; dysmetria RUPERTO>RUE; DTRs 2+    Significant Labs:    Recent Labs  Lab 09/27/17 0421   *      K 3.5      CO2 26   BUN 7*   CREATININE 0.8   CALCIUM 9.5       Recent Labs  Lab 09/27/17 0421   WBC 7.23   HGB 13.8*   HCT 41.6          Recent Labs  Lab 09/27/17 0421   INR 1.0   APTT 24.9     Microbiology Results (last 7 days)     ** No results found for the last 168 hours. **        ABGs: No results for input(s): PH, PCO2, PO2, HCO3, POCSATURATED, BE in the last 48 hours.  Cardiac markers:   Recent Labs  Lab 09/27/17 0421   TROPONINI <0.006     CMP:   Recent Labs  Lab 09/27/17 0421   *   CALCIUM 9.5   ALBUMIN 3.6   PROT 8.4      K 3.5   CO2 26      BUN 7*   CREATININE 0.8   ALKPHOS 103   ALT 15   AST 20   BILITOT 0.4     CRP: No results for input(s): CRP in the last 48 hours.  ESR: No results for input(s): POCESR, ERYTHROCYTES in the last 48 hours.  LFTs:   Recent Labs  Lab 09/27/17 0421   ALT 15   AST 20   ALKPHOS 103   BILITOT 0.4   PROT 8.4   ALBUMIN 3.6     Procalcitonin: No results for input(s): PROCAL in the last 48 hours.    Significant Diagnostics:  CT: Ct Head Without Contrast    Result Date: 9/27/2017   1.  2.6 cm acute hemorrhage within the superior aspect of the left cerebellar hemisphere and vermis with surrounding edema and mild associated mass effect upon the fourth ventricle.  No hydrocephalus.  This hemorrhage is likely hypertensive in etiology.    Findings identified at 4:44 AM and discussed with BOUBACAR Vogel in the emergency department at 4:45 AM.  Neurocritical care consultation recommended. 2.  Probable mild chronic microvascular ischemic changes.  Small remote lacunar infarcts in the bilateral thalami.  ______________________________________ Electronically signed by resident: MCAY LUQUE MD Date:     09/27/17 Time:    04:47 As the supervising and teaching physician, I personally reviewed the images and resident's interpretation and I agree with the findings. Electronically signed by: JOSE C ADAMS MD Date:     09/27/17 Time:    04:54     Assessment/Plan:     Nontraumatic intracerebral hemorrhage of cerebellum    67M on xarelto for DVT with PMH HTN, prostate CA, HLD, TIAs (on ASA) who presents with cerebellar ICH (ICH score 1).    Neuro:  --Admit to NCC  --Q1h neuro checks  --re-CTH at 10AM   -Consider MRI if suspicious  --HOB >30  --Call NSGY with any acute changes in neuro exam    CVS:  --SBP goals <160 on cardene (hydralazine, labetalol, amlodipine PRN)    Pulm:  --Aggressive pulmonary management  --Insentive spirometry & Duonebs PRN    Heme/onc/ID:  --CBC/CMP/Coags daily  --PCC to reverse Xarelto    F/E/GI  --Na 140-150  --Diet pending swallow eval  --PPI  --Laxatives for bowel care    Ppx:  -DVT ppx per NCC  -PPI    Dispo:  -PT/OT/OOB  -Speech eval  -Consult social work for dispo            Thank you for your consult. I will follow-up with patient. Please contact us if you have any additional questions.    Ash Michele MD  Neurosurgery  [facility]

## 2017-09-27 NOTE — PLAN OF CARE
Problem: Patient Care Overview  Goal: Plan of Care Review  Outcome: Ongoing (interventions implemented as appropriate)  Plan of care reviewed with patient at 1500. Patient verbalized understanding. All questions and concerns addressed today. Patient remains free from injury and falls. No acute events.   Patient had CT at 1000.   Cardene was stopped at 1400; patient currently has NS infusing at 75ml/hr. Blood pressure has been maintained <160 since.   Patient progressing towards goal.   Will continue to monitor.   See flowsheet for full assessment and vitals throughout shift.

## 2017-09-27 NOTE — SUBJECTIVE & OBJECTIVE
Prescriptions Prior to Admission   Medication Sig Dispense Refill Last Dose    amlodipine (NORVASC) 10 MG tablet Take 1 tablet (10 mg total) by mouth once daily. 30 tablet 11 11/22/2016    aspirin (ECOTRIN) 81 MG EC tablet Take 81 mg by mouth once daily.   Taking    lisinopril (PRINIVIL,ZESTRIL) 40 MG tablet TK 1 T PO  D  0     NASONEX 50 mcg/actuation nasal spray    Taking    sildenafil (VIAGRA) 100 MG tablet Take 1 tablet (100 mg total) by mouth daily as needed for Erectile Dysfunction. 6 tablet 5 Taking    XARELTO 15 mg Tab TK 1 T PO  QPM  0        Review of patient's allergies indicates:   Allergen Reactions    Iodine and iodide containing products Anaphylaxis       Past Medical History:   Diagnosis Date    Allergy     Colon polyp     benign    Elevated PSA     Glaucoma     High cholesterol     History of blood clots     lungs    Hypertension     Kidney stone     Prostate cancer      Past Surgical History:   Procedure Laterality Date    COLON SURGERY      CYSTOSCOPY      KIDNEY STONE SURGERY      LITHOTRIPSY      NECK SURGERY      PROSTATE SURGERY       Family History     None        Social History Main Topics    Smoking status: Current Every Day Smoker     Packs/day: 1.00     Years: 40.00     Types: Cigarettes    Smokeless tobacco: Never Used    Alcohol use Yes      Comment: social    Drug use: No    Sexual activity: Yes     Partners: Female     Review of Systems   Constitutional: Negative for activity change, chills, diaphoresis, fatigue and fever.   HENT: Negative for tinnitus, trouble swallowing and voice change.    Eyes: Negative for photophobia and visual disturbance.   Respiratory: Negative for cough, choking, chest tightness, shortness of breath, wheezing and stridor.    Cardiovascular: Negative for chest pain, palpitations and leg swelling.   Gastrointestinal: Positive for nausea and vomiting. Negative for abdominal distention, abdominal pain, constipation and diarrhea.    Endocrine: Negative for polydipsia, polyphagia and polyuria.   Genitourinary: Negative for dysuria, frequency, hematuria and urgency.   Musculoskeletal: Positive for gait problem. Negative for back pain, neck pain and neck stiffness.   Skin: Negative for color change, pallor, rash and wound.   Neurological: Positive for headaches. Negative for dizziness, tremors, seizures, syncope, facial asymmetry, speech difficulty, weakness, light-headedness and numbness.   Hematological: Negative for adenopathy. Does not bruise/bleed easily.   Psychiatric/Behavioral: Negative for agitation, behavioral problems and confusion.     Objective:     Weight: 72.6 kg (160 lb)  Body mass index is 25.06 kg/m².  Vital Signs (Most Recent):  Temp: 98.4 °F (36.9 °C) (09/27/17 0011)  Pulse: 77 (09/27/17 0801)  Resp: 18 (09/27/17 0801)  BP: (!) 149/67 (09/27/17 0801)  SpO2: 95 % (09/27/17 0801) Vital Signs (24h Range):  Temp:  [98.4 °F (36.9 °C)] 98.4 °F (36.9 °C)  Pulse:  [63-84] 77  Resp:  [16-22] 18  SpO2:  [95 %-99 %] 95 %  BP: (134-194)/(60-91) 149/67                           Physical Exam:  Nursing note and vitals reviewed.    Constitutional: He appears well-developed and well-nourished.     Eyes: Pupils are equal, round, and reactive to light. Conjunctivae and EOM are normal.     Cardiovascular: Normal rate, regular rhythm and normal pulses.     Abdominal: Soft. Bowel sounds are normal.     Psych/Behavior: He is alert. He is oriented to person, place, and time. He has a normal mood and affect.     Neurosurgery exam:  General: AOx3; GCS 15  CNII-XII: Fine exam of CN is intact; PERRL; No pronator drift  Extremities: 5/5 motor in all distributions; sensorium intact; dysmetria RUPERTO>RUE; DTRs 2+    Significant Labs:    Recent Labs  Lab 09/27/17 0421   *      K 3.5      CO2 26   BUN 7*   CREATININE 0.8   CALCIUM 9.5       Recent Labs  Lab 09/27/17 0421   WBC 7.23   HGB 13.8*   HCT 41.6          Recent Labs  Lab  09/27/17 0421   INR 1.0   APTT 24.9     Microbiology Results (last 7 days)     ** No results found for the last 168 hours. **        ABGs: No results for input(s): PH, PCO2, PO2, HCO3, POCSATURATED, BE in the last 48 hours.  Cardiac markers:   Recent Labs  Lab 09/27/17 0421   TROPONINI <0.006     CMP:   Recent Labs  Lab 09/27/17 0421   *   CALCIUM 9.5   ALBUMIN 3.6   PROT 8.4      K 3.5   CO2 26      BUN 7*   CREATININE 0.8   ALKPHOS 103   ALT 15   AST 20   BILITOT 0.4     CRP: No results for input(s): CRP in the last 48 hours.  ESR: No results for input(s): POCESR, ERYTHROCYTES in the last 48 hours.  LFTs:   Recent Labs  Lab 09/27/17 0421   ALT 15   AST 20   ALKPHOS 103   BILITOT 0.4   PROT 8.4   ALBUMIN 3.6     Procalcitonin: No results for input(s): PROCAL in the last 48 hours.    Significant Diagnostics:  CT: Ct Head Without Contrast    Result Date: 9/27/2017   1.  2.6 cm acute hemorrhage within the superior aspect of the left cerebellar hemisphere and vermis with surrounding edema and mild associated mass effect upon the fourth ventricle.  No hydrocephalus.  This hemorrhage is likely hypertensive in etiology.    Findings identified at 4:44 AM and discussed with BOUBACAR Vogel in the emergency department at 4:45 AM.  Neurocritical care consultation recommended. 2.  Probable mild chronic microvascular ischemic changes.  Small remote lacunar infarcts in the bilateral thalami. ______________________________________ Electronically signed by resident: MACY LUQUE MD Date:     09/27/17 Time:    04:47 As the supervising and teaching physician, I personally reviewed the images and resident's interpretation and I agree with the findings. Electronically signed by: JOSE C ADAMS MD Date:     09/27/17 Time:    04:54

## 2017-09-27 NOTE — PLAN OF CARE
Problem: Patient Care Overview  Goal: Plan of Care Review  Outcome: Ongoing (interventions implemented as appropriate)  Nutrition assessment completed. Please see RD note for details.    Recommendation/Intervention:   1. Continue Cardiac diet   2. If po intake <50% of meals, add Boost TID to increase caloric intake.      RD to monitor.

## 2017-09-27 NOTE — CONSULTS
Inpatient consult to Physical Medicine Rehab  Consult performed by: ASHWIN TAVERAS  Consult ordered by: NAEEM BRANHAM  Reason for consult: assess rehab needs       Reviewed patient history and current admission.  Rehab team following.  Full consult to follow.    OZZY Knapp, FNP-C  Physical Medicine & Rehabilitation   09/27/2017  Spectralink: [phone]

## 2017-09-27 NOTE — PLAN OF CARE
Problem: Occupational Therapy Goal  Goal: Occupational Therapy Goal  OT evaluation completed.  JASMINA Kidd  9/27/2017

## 2017-09-27 NOTE — ASSESSMENT & PLAN NOTE
-Risk factor for increased clotting  -Etiology of cerebellar ICH likely hypertensive vs ischemic stroke with hemorrhagic transformation while on rivaroxaban.  Will check 2D Echo for cardioembolic etiology.

## 2017-09-27 NOTE — ASSESSMENT & PLAN NOTE
-Continue rivaroxaban within 2 weeks or earlier if ischemic etiology with hemorrhagic conversion vs HTN-related ICH

## 2017-09-27 NOTE — ED PROVIDER NOTES
Encounter Date: 9/27/2017    SCRIBE #1 NOTE: I, Colleen Parsons, am scribing for, and in the presence of,  Dr. Sherman. I have scribed the following portions of the note - the EKG reading.       History     Chief Complaint   Patient presents with    Headache     Headache for the past hour. Has not taken any medications. No new deficits per patient and EMS.     67-year-old male presents to the ER with his significant other for evaluation of headache, nausea, vomiting, diaphoresis.  Symptoms were acute onset last night around 10 or 11 PM.  He has never had a headache like this before in his life.  He admits to nausea with multiple episodes of emesis at home and in the waiting room.  The patient's significant other reports that throughout the evening he has not appeared himself.  He has been very lethargic and required assistance more than normal.  Patient denies any chest pain shortness of breath fever, chills          Review of patient's allergies indicates:   Allergen Reactions    Iodine and iodide containing products Anaphylaxis     Past Medical History:   Diagnosis Date    Allergy     Colon polyp     benign    Elevated PSA     Glaucoma     High cholesterol     History of blood clots     lungs    Hypertension     Kidney stone     Prostate cancer      Past Surgical History:   Procedure Laterality Date    COLON SURGERY      CYSTOSCOPY      KIDNEY STONE SURGERY      LITHOTRIPSY      NECK SURGERY      PROSTATE SURGERY       No family history on file.  Social History   Substance Use Topics    Smoking status: Current Every Day Smoker     Packs/day: 1.00     Years: 40.00     Types: Cigarettes    Smokeless tobacco: Never Used    Alcohol use Yes      Comment: social     Review of Systems   Constitutional: Positive for activity change. Negative for chills, fatigue and fever.   HENT: Negative for sore throat.    Respiratory: Negative for shortness of breath.    Cardiovascular: Negative for chest pain.    Gastrointestinal: Negative for nausea.   Genitourinary: Negative for dysuria.   Musculoskeletal: Negative for back pain.   Skin: Negative for rash.   Neurological: Positive for dizziness, weakness and headaches.   Hematological: Does not bruise/bleed easily.       Physical Exam     Initial Vitals [09/27/17 0011]   BP Pulse Resp Temp SpO2   (!) 178/82 84 18 98.4 °F (36.9 °C) 99 %      MAP       114         Physical Exam    Constitutional: Vital signs are normal. He appears well-developed and well-nourished.   HENT:   Head: Normocephalic and atraumatic.   Eyes: Conjunctivae are normal.   Cardiovascular: Normal rate and regular rhythm. Exam reveals no gallop and no friction rub.    No murmur heard.  Pulmonary/Chest: No respiratory distress. He has no wheezes. He has no rhonchi. He has no rales. He exhibits no tenderness.   Abdominal: Soft. Normal appearance, normal aorta and bowel sounds are normal.   Musculoskeletal: Normal range of motion.   Neurological: He is alert and oriented to person, place, and time.   Patient alert and oriented  Negative pronator  EOMI  Strength and sensation equal bilaterally  Patient appears weak and ill. Slow to respond to questioning.    Skin: Skin is warm and intact.   Psychiatric: He has a normal mood and affect. His speech is normal and behavior is normal. Cognition and memory are normal.         ED Course   Procedures  Labs Reviewed   CBC W/ AUTO DIFFERENTIAL - Abnormal; Notable for the following:        Result Value    Hemoglobin 13.8 (*)     Gran% 77.5 (*)     Lymph% 16.5 (*)     All other components within normal limits   COMPREHENSIVE METABOLIC PANEL - Abnormal; Notable for the following:     Glucose 131 (*)     BUN, Bld 7 (*)     All other components within normal limits   TROPONIN I   B-TYPE NATRIURETIC PEPTIDE    Narrative:     add on tests pt inr and aptt per dr liane henderson order #[id number]  [id number]   09/27/2017  05:36    APTT   PROTIME-INR   APTT    Narrative:     add  on tests pt inr and aptt per dr liane henderson order #[id number]  [id number]   09/27/2017  05:36    PROTIME-INR    Narrative:     add on tests pt inr and aptt per dr liane henderson order #[id number]  [id number]   09/27/2017  05:36    URINALYSIS, REFLEX TO URINE CULTURE   LIPID PANEL   HEMOGLOBIN A1C   TSH   APTT   PROTIME-INR   URINALYSIS   TYPE & SCREEN   POCT GLUCOSE MONITORING CONTINUOUS     X-Ray Chest 1 View   Final Result      As above.         Electronically signed by: MAX BRANNON   Date:     09/27/17   Time:    05:54       CT Head Without Contrast   Final Result          1.  2.6 cm acute hemorrhage within the superior aspect of the left cerebellar hemisphere and vermis with surrounding edema and mild associated mass effect upon the fourth ventricle.  No hydrocephalus.  This hemorrhage is likely hypertensive in etiology.    Findings identified at 4:44 AM and discussed with BOUBACAR Vogel in the emergency department at 4:45 AM.  Neurocritical care consultation recommended.      2.  Probable mild chronic microvascular ischemic changes.  Small remote lacunar infarcts in the bilateral thalami.   ______________________________________       Electronically signed by resident: MACY LUQUE MD   Date:     09/27/17   Time:    04:47                  As the supervising and teaching physician, I personally reviewed the images and resident's interpretation and I agree with the findings.               Electronically signed by: JOSE C ADAMS MD   Date:     09/27/17   Time:    04:54       US Lower Extremity Veins Bilateral    (Results Pending)   CT Head Without Contrast    (Results Pending)         EKG Readings: (Independently Interpreted)   EKG: NSR, no BENITO's, t wave inversion in II, III, and aVF as well as 1mm ST depressions in II, III, and aVF, no STEMI           Medical Decision Making:   History:   Old Medical Records: I decided to obtain old medical records.  Independently Interpreted Test(s):   I have ordered and  independently interpreted EKG Reading(s) - see prior notes  Clinical Tests:   Lab Tests: Ordered and Reviewed  Radiological Study: Ordered and Reviewed  Medical Tests: Ordered and Reviewed  ED Management:  Urgent evaluation of a 67-year-old male with acute onset of a headache.  CT scan reveals a 2.6 cm cerebellar hemorrhage with mild mass effect without hydrocephalus.   Patient will be admitted to the neuro critical care.  I have contacted neuro critical care and neurosurgery.  The patient is also on Xarelto.   I will start treatment with PCC.    Will start treatment with labetalol to aggressively control the patient's blood pressure with goal getting the blood pressure less than 140 systolic  Neuro critical care has evaluated the patient.  He was given labetalol which did not bring his pressure down very much.  We'll start him on a Cardene drip    Neuro ICU accepted the patient for admission.          Scribe Attestation:   Scribe #1: I performed the above scribed service and the documentation accurately describes the services I performed. I attest to the accuracy of the note.    Attending Attestation:     Physician Attestation Statement for NP/PA:   I discussed this assessment and plan of this patient with the NP/PA, but I did not personally examine the patient. The face to face encounter was performed by the NP/PA.        Physician Attestation for Scribe:  Physician Attestation Statement for Scribe #1: I, Dr. Sherman, reviewed documentation, as scribed by Colleen Parsons in my presence, and it is both accurate and complete.                 ED Course      Clinical Impression:   The primary encounter diagnosis was Nontraumatic intracerebral hemorrhage of cerebellum, unspecified laterality. Diagnoses of Fatigue and Hypertension were also pertinent to this visit.    Disposition:   Disposition: Admitted  Condition: Critical                        Chico Vogel PA-C  09/27/17 0542       Ricardo Sherman,  MD  09/27/17 0713

## 2017-09-28 PROBLEM — G93.6 VASOGENIC CEREBRAL EDEMA: Status: ACTIVE | Noted: 2017-09-28

## 2017-09-28 LAB
ALBUMIN SERPL BCP-MCNC: 2.9 G/DL
ALP SERPL-CCNC: 83 U/L
ALT SERPL W/O P-5'-P-CCNC: 11 U/L
ANION GAP SERPL CALC-SCNC: 6 MMOL/L
APTT BLDCRRT: 24 SEC
AST SERPL-CCNC: 16 U/L
BASOPHILS # BLD AUTO: 0.02 K/UL
BASOPHILS NFR BLD: 0.4 %
BILIRUB SERPL-MCNC: 0.6 MG/DL
BUN SERPL-MCNC: 7 MG/DL
CALCIUM SERPL-MCNC: 8.8 MG/DL
CHLORIDE SERPL-SCNC: 106 MMOL/L
CHOLEST SERPL-MCNC: 177 MG/DL
CHOLEST/HDLC SERPL: 4.3 {RATIO}
CO2 SERPL-SCNC: 29 MMOL/L
CREAT SERPL-MCNC: 0.8 MG/DL
DIFFERENTIAL METHOD: ABNORMAL
EOSINOPHIL # BLD AUTO: 0.1 K/UL
EOSINOPHIL NFR BLD: 0.9 %
ERYTHROCYTE [DISTWIDTH] IN BLOOD BY AUTOMATED COUNT: 13.6 %
EST. GFR  (AFRICAN AMERICAN): >60 ML/MIN/1.73 M^2
EST. GFR  (NON AFRICAN AMERICAN): >60 ML/MIN/1.73 M^2
ESTIMATED AVG GLUCOSE: 94 MG/DL
GLUCOSE SERPL-MCNC: 95 MG/DL
HBA1C MFR BLD HPLC: 4.9 %
HCT VFR BLD AUTO: 39.3 %
HDLC SERPL-MCNC: 41 MG/DL
HDLC SERPL: 23.2 %
HGB BLD-MCNC: 12.4 G/DL
INR PPP: 1
LDLC SERPL CALC-MCNC: 122.2 MG/DL
LYMPHOCYTES # BLD AUTO: 1.6 K/UL
LYMPHOCYTES NFR BLD: 30.7 %
MAGNESIUM SERPL-MCNC: 1.8 MG/DL
MAGNESIUM SERPL-MCNC: 2.1 MG/DL
MCH RBC QN AUTO: 28.4 PG
MCHC RBC AUTO-ENTMCNC: 31.6 G/DL
MCV RBC AUTO: 90 FL
MONOCYTES # BLD AUTO: 0.6 K/UL
MONOCYTES NFR BLD: 11.2 %
NEUTROPHILS # BLD AUTO: 3 K/UL
NEUTROPHILS NFR BLD: 56.6 %
NONHDLC SERPL-MCNC: 136 MG/DL
PHOSPHATE SERPL-MCNC: 3 MG/DL
PLATELET # BLD AUTO: 169 K/UL
PMV BLD AUTO: 12.1 FL
POCT GLUCOSE: 103 MG/DL (ref 70–110)
POCT GLUCOSE: 108 MG/DL (ref 70–110)
POCT GLUCOSE: 122 MG/DL (ref 70–110)
POCT GLUCOSE: 67 MG/DL (ref 70–110)
POCT GLUCOSE: 95 MG/DL (ref 70–110)
POTASSIUM SERPL-SCNC: 3.3 MMOL/L
POTASSIUM SERPL-SCNC: 4.2 MMOL/L
PROT SERPL-MCNC: 6.9 G/DL
PROTHROMBIN TIME: 10.6 SEC
RBC # BLD AUTO: 4.36 M/UL
SODIUM SERPL-SCNC: 141 MMOL/L
T4 FREE SERPL-MCNC: 0.98 NG/DL
TRIGL SERPL-MCNC: 69 MG/DL
TSH SERPL DL<=0.005 MIU/L-ACNC: 0.28 UIU/ML
WBC # BLD AUTO: 5.35 K/UL

## 2017-09-28 PROCEDURE — 99232 SBSQ HOSP IP/OBS MODERATE 35: CPT | Mod: ,,, | Performed by: ANESTHESIOLOGY

## 2017-09-28 PROCEDURE — 99232 SBSQ HOSP IP/OBS MODERATE 35: CPT | Mod: ,,, | Performed by: NURSE PRACTITIONER

## 2017-09-28 PROCEDURE — 85610 PROTHROMBIN TIME: CPT

## 2017-09-28 PROCEDURE — 85025 COMPLETE CBC W/AUTO DIFF WBC: CPT

## 2017-09-28 PROCEDURE — 84439 ASSAY OF FREE THYROXINE: CPT

## 2017-09-28 PROCEDURE — 84132 ASSAY OF SERUM POTASSIUM: CPT

## 2017-09-28 PROCEDURE — 92507 TX SP LANG VOICE COMM INDIV: CPT

## 2017-09-28 PROCEDURE — A4216 STERILE WATER/SALINE, 10 ML: HCPCS | Performed by: NURSE PRACTITIONER

## 2017-09-28 PROCEDURE — 85730 THROMBOPLASTIN TIME PARTIAL: CPT

## 2017-09-28 PROCEDURE — 94761 N-INVAS EAR/PLS OXIMETRY MLT: CPT

## 2017-09-28 PROCEDURE — 97535 SELF CARE MNGMENT TRAINING: CPT

## 2017-09-28 PROCEDURE — 25000003 PHARM REV CODE 250: Performed by: PHYSICIAN ASSISTANT

## 2017-09-28 PROCEDURE — 63600175 PHARM REV CODE 636 W HCPCS: Performed by: NURSE PRACTITIONER

## 2017-09-28 PROCEDURE — A9585 GADOBUTROL INJECTION: HCPCS | Performed by: PSYCHIATRY & NEUROLOGY

## 2017-09-28 PROCEDURE — 20000000 HC ICU ROOM

## 2017-09-28 PROCEDURE — 84443 ASSAY THYROID STIM HORMONE: CPT

## 2017-09-28 PROCEDURE — 84100 ASSAY OF PHOSPHORUS: CPT

## 2017-09-28 PROCEDURE — 25000003 PHARM REV CODE 250: Performed by: NURSE PRACTITIONER

## 2017-09-28 PROCEDURE — 27000221 HC OXYGEN, UP TO 24 HOURS

## 2017-09-28 PROCEDURE — 80061 LIPID PANEL: CPT

## 2017-09-28 PROCEDURE — 97112 NEUROMUSCULAR REEDUCATION: CPT

## 2017-09-28 PROCEDURE — 80053 COMPREHEN METABOLIC PANEL: CPT

## 2017-09-28 PROCEDURE — 25500020 PHARM REV CODE 255: Performed by: PSYCHIATRY & NEUROLOGY

## 2017-09-28 PROCEDURE — 99233 SBSQ HOSP IP/OBS HIGH 50: CPT | Mod: GC,,, | Performed by: PSYCHIATRY & NEUROLOGY

## 2017-09-28 PROCEDURE — 83735 ASSAY OF MAGNESIUM: CPT

## 2017-09-28 PROCEDURE — 63600175 PHARM REV CODE 636 W HCPCS: Performed by: PHYSICIAN ASSISTANT

## 2017-09-28 PROCEDURE — 25000003 PHARM REV CODE 250: Performed by: STUDENT IN AN ORGANIZED HEALTH CARE EDUCATION/TRAINING PROGRAM

## 2017-09-28 PROCEDURE — 83036 HEMOGLOBIN GLYCOSYLATED A1C: CPT

## 2017-09-28 PROCEDURE — 83735 ASSAY OF MAGNESIUM: CPT | Mod: 91

## 2017-09-28 RX ORDER — SODIUM,POTASSIUM PHOSPHATES 280-250MG
2 POWDER IN PACKET (EA) ORAL
Status: DISCONTINUED | OUTPATIENT
Start: 2017-09-28 | End: 2017-10-03

## 2017-09-28 RX ORDER — LANOLIN ALCOHOL/MO/W.PET/CERES
800 CREAM (GRAM) TOPICAL
Status: DISCONTINUED | OUTPATIENT
Start: 2017-09-28 | End: 2017-10-03

## 2017-09-28 RX ORDER — ATORVASTATIN CALCIUM 20 MG/1
40 TABLET, FILM COATED ORAL NIGHTLY
Status: DISCONTINUED | OUTPATIENT
Start: 2017-09-28 | End: 2017-10-03 | Stop reason: HOSPADM

## 2017-09-28 RX ORDER — LABETALOL HYDROCHLORIDE 5 MG/ML
10 INJECTION, SOLUTION INTRAVENOUS EVERY 4 HOURS PRN
Status: DISCONTINUED | OUTPATIENT
Start: 2017-09-28 | End: 2017-10-03 | Stop reason: HOSPADM

## 2017-09-28 RX ORDER — ATORVASTATIN CALCIUM 20 MG/1
40 TABLET, FILM COATED ORAL DAILY
Status: DISCONTINUED | OUTPATIENT
Start: 2017-09-28 | End: 2017-09-28

## 2017-09-28 RX ORDER — GADOBUTROL 604.72 MG/ML
9 INJECTION INTRAVENOUS
Status: COMPLETED | OUTPATIENT
Start: 2017-09-28 | End: 2017-09-28

## 2017-09-28 RX ORDER — POTASSIUM CHLORIDE 20 MEQ/15ML
40 SOLUTION ORAL
Status: DISCONTINUED | OUTPATIENT
Start: 2017-09-28 | End: 2017-10-03

## 2017-09-28 RX ORDER — HYDRALAZINE HYDROCHLORIDE 20 MG/ML
10 INJECTION INTRAMUSCULAR; INTRAVENOUS EVERY 4 HOURS PRN
Status: DISCONTINUED | OUTPATIENT
Start: 2017-09-28 | End: 2017-10-03 | Stop reason: HOSPADM

## 2017-09-28 RX ORDER — POTASSIUM CHLORIDE 20 MEQ/15ML
60 SOLUTION ORAL
Status: DISCONTINUED | OUTPATIENT
Start: 2017-09-28 | End: 2017-10-03

## 2017-09-28 RX ADMIN — POTASSIUM CHLORIDE 40 MEQ: 20 SOLUTION ORAL at 05:09

## 2017-09-28 RX ADMIN — HYDRALAZINE HYDROCHLORIDE 10 MG: 20 INJECTION INTRAMUSCULAR; INTRAVENOUS at 03:09

## 2017-09-28 RX ADMIN — HYDRALAZINE HYDROCHLORIDE 10 MG: 20 INJECTION INTRAMUSCULAR; INTRAVENOUS at 11:09

## 2017-09-28 RX ADMIN — LEVETIRACETAM 500 MG: 500 TABLET ORAL at 09:09

## 2017-09-28 RX ADMIN — SODIUM CHLORIDE: 0.9 INJECTION, SOLUTION INTRAVENOUS at 02:09

## 2017-09-28 RX ADMIN — HYDRALAZINE HYDROCHLORIDE 50 MG: 50 TABLET ORAL at 02:09

## 2017-09-28 RX ADMIN — LISINOPRIL 40 MG: 20 TABLET ORAL at 09:09

## 2017-09-28 RX ADMIN — STANDARDIZED SENNA CONCENTRATE AND DOCUSATE SODIUM 1 TABLET: 8.6; 5 TABLET, FILM COATED ORAL at 09:09

## 2017-09-28 RX ADMIN — AMLODIPINE BESYLATE 10 MG: 10 TABLET ORAL at 09:09

## 2017-09-28 RX ADMIN — ATORVASTATIN CALCIUM 40 MG: 20 TABLET, FILM COATED ORAL at 09:09

## 2017-09-28 RX ADMIN — HYDRALAZINE HYDROCHLORIDE 75 MG: 50 TABLET ORAL at 09:09

## 2017-09-28 RX ADMIN — Medication 800 MG: at 05:09

## 2017-09-28 RX ADMIN — HYDRALAZINE HYDROCHLORIDE 10 MG: 20 INJECTION INTRAMUSCULAR; INTRAVENOUS at 05:09

## 2017-09-28 RX ADMIN — POLYETHYLENE GLYCOL 3350 17 G: 17 POWDER, FOR SOLUTION ORAL at 09:09

## 2017-09-28 RX ADMIN — GADOBUTROL 9 ML: 604.72 INJECTION INTRAVENOUS at 09:09

## 2017-09-28 RX ADMIN — Medication 800 MG: at 09:09

## 2017-09-28 RX ADMIN — Medication 3 ML: at 02:09

## 2017-09-28 RX ADMIN — POTASSIUM CHLORIDE 40 MEQ: 20 SOLUTION ORAL at 09:09

## 2017-09-28 NOTE — PLAN OF CARE
Problem: Patient Care Overview  Goal: Plan of Care Review  Outcome: Ongoing (interventions implemented as appropriate)  POC reviewed with . Otis OLIVIA Salcido at 0400. Patient verbalized understanding and all questions and concerns addressed. No acute events overnight. SBP maintained <160 without the use of PRN BP medications. Patient progressing towards goals. VS and assessments per flowsheets; will continue to monitor.

## 2017-09-28 NOTE — PLAN OF CARE
Problem: Patient Care Overview  Goal: Plan of Care Review  Outcome: Ongoing (interventions implemented as appropriate)  POC reviewed with pt at 1400. Pt verbalized understanding. Questions and concerns addressed. No acute events today. Pt awaiting MRI.  Pt progressing toward goals. Will continue to monitor. See flowsheets for full assessment and VS info.

## 2017-09-28 NOTE — PROGRESS NOTES
MRI contacted, spoke to Richard.  Stated that they will not be able to take patient until later afternoon and will call back with scheduled time.  Spectralink number given.  BOUBACAR Enriquez notified of delay.  Will continue to monitor.

## 2017-09-28 NOTE — PROGRESS NOTES
[facility]  Physical Medicine & Rehab  Progress Note    Patient Name: Otis Salcido  MRN: [id number]  Admission Date: 9/27/2017  Length of Stay: 1 days  Attending Physician: Apollo Murphy MD      Subjective:     Principal Problem: Nontraumatic intracerebral hemorrhage of cerebellum    Hospital Course:   9/27/17:  Evaluated by therapy.  Bed mobility min-maxA.  Sit to stand maxA and transfers maxA.  UBD maxA and LBD totalA.  Passed bedside swallow evaluation.  SLP recommending regular diet and thin liquids.  Found to have cognitive-linguistic impairments.      Interval History:  (09/28/2017): Patient is seen for follow-up rehab evaluation and recommendations.  No acute events over night.  Partipcating with therapy.  Barriers for discharge/rehab admission: not medically ready for discharge.     HPI, Past Medical, Surgical, Family, and Social History remains the same as documented in the initial encounter.      Scheduled Medications:    amlodipine  10 mg Oral Daily    atorvastatin  40 mg Oral QHS    hydrALAZINE  50 mg Oral Q8H    levetiracetam  500 mg Oral BID    lisinopril  40 mg Oral Daily    polyethylene glycol  17 g Oral Daily    senna-docusate 8.6-50 mg  1 tablet Oral BID    sodium chloride 0.9%  3 mL Intravenous Q8H       PRN Medications: acetaminophen, hydrALAZINE, labetalol, magnesium oxide, magnesium oxide, ondansetron, potassium chloride 10%, potassium chloride 10%, potassium chloride 10%, potassium, sodium phosphates, potassium, sodium phosphates, potassium, sodium phosphates    Review of Systems   Constitutional: Negative for chills, fatigue and fever.   HENT: Negative for drooling, hearing loss, trouble swallowing and voice change.    Eyes: Negative for pain and visual disturbance.   Respiratory: Negative for cough, shortness of breath and wheezing.    Cardiovascular: Negative for chest pain and palpitations.   Gastrointestinal: Negative for abdominal pain, nausea and vomiting.    Genitourinary: Negative for difficulty urinating and flank pain.   Musculoskeletal: Positive for gait problem. Negative for arthralgias.   Skin: Negative for rash and wound.   Neurological: Positive for weakness. Negative for dizziness, numbness and headaches.   Psychiatric/Behavioral: Negative for agitation and hallucinations. The patient is not nervous/anxious.      Objective:     Vital Signs (Most Recent):  Temp: 97.8 °F (36.6 °C) (17 1100)  Pulse: (!) 53 (17 1200)  Resp: 17 (17 1200)  BP: (!) 155/68 (17 1200)  SpO2: 99 % (17 1200)    Vital Signs (24h Range):  Temp:  [97.8 °F (36.6 °C)-98.9 °F (37.2 °C)] 97.8 °F (36.6 °C)  Pulse:  [50-84] 53  Resp:  [15-33] 17  SpO2:  [97 %-100 %] 99 %  BP: (117-156)/(54-70) 155/68     Physical Exam   Constitutional: He is oriented to person, place, and time. He appears well-developed and well-nourished. No distress.   HENT:   Head: Normocephalic and atraumatic.   Right Ear: External ear normal.   Left Ear: External ear normal.   Nose: Nose normal.   Eyes: Right eye exhibits no discharge. Left eye exhibits no discharge. No scleral icterus.   Neck: Normal range of motion.   Cardiovascular: Normal rate, regular rhythm and intact distal pulses.    Pulmonary/Chest: Effort normal. No respiratory distress. He has no wheezes.   Abdominal: Soft. He exhibits no distension. There is no tenderness.   Musculoskeletal: Normal range of motion. He exhibits no edema or tenderness.   Neurological: He is alert and oriented to person, place, and time.   -  Mental Status:  AAOx3.  Follows commands.  Answers correct age and .  Recent and remote memory intact.  -  Speech and language:  no aphasia or dysarthria.    -  Vision:  no hemianopsia or ptosis.    -  Facial movement (CN VII): symmetrical   -  Coordination:  Finger to nose exam:  RUE normal, LUE dysmetria.  -  Motor:  No pronator drift. RUE: 5/5.  LUE: 4/5.  -  Tone:  Normal.   -  Sensory:  Intact to light  touch and pin prick.   Skin: Skin is warm and dry. No rash noted.   Psychiatric: He has a normal mood and affect. His behavior is normal. Thought content normal. Cognition and memory are impaired.   Vitals reviewed.       Diagnostic Results:   Labs: Reviewed  X-Ray: Reviewed  US: Reviewed  CT: Reviewed  Assessment/Plan:      Nontraumatic intracerebral hemorrhage of cerebellum    -  Presented with severe headache with associated N/V, vertigo, and diaphoresis.    -  CTH showed L cerebellar ICH.    -  Found to be hypertensive and started on Cardene infusion.    -  Neurosurgery and vascular neurology following.  -  Etiology likely hypertensive vs ischemic stroke with hemorrhagic transformation while on rivaroxaban.    Functional status: see hospital course  Cognitive/Speech/Language status:  Cognitive-Linguistic Impairment.  Nutrition/Swallow Status:  Passed bedside swallow evaluation.  SLP recommending regular diet and thin liquids.    Recommendations  -  Encourage mobility, OOB in chair at least 3 hours per day, and early ambulation as appropriate   -  PT/OT evaluate and treat  -  SLP speech and cognitive evaluate and treat  -  Monitor sleep disturbances and establish consistent sleep-wake cycle  -  Monitor for bowel and bladder dysfunction  -  Monitor for shoulder pain and subluxation  -  Monitor for spasticity  -  Monitor for and prevent skin breakdown and pressure ulcers  · Early mobility, repositioning/weight shifting every 20-30 minutes when sitting, turn patient every 2 hours, proper mattress/overlay and chair cushioning, pressure relief/heel protector boots  -  DVT prophylaxis:  ILANA, SCD  -  Reviewed discharge options (IP rehab, SNF, HH therapy, and OP therapy)        Acute deep vein thrombosis (DVT) of proximal vein of left lower extremity    -  On home rivaroxaban, holding for ICH  -  Repeat BLE US for history of DVT-negative.        History of blood clots    -  On home rivaroxaban, holding for ICH         Participating with therapy.  Will follow and discuss with rehab team for rehab recommendation.      Juju Dela Cruz NP  Department of Physical Medicine & Rehab   [facility]

## 2017-09-28 NOTE — ASSESSMENT & PLAN NOTE
-Risk factor for increased clotting  -follow up with urologist and oncologist  -history of PE 2012  -history of DVT 2016  -recommend holding xarelto for at least 1 week due to ICH

## 2017-09-28 NOTE — PT/OT/SLP PROGRESS
"Occupational Therapy  Treatment    Otis Salcido   MRN: [id number]   Admitting Diagnosis:  ICH  OT Date of Treatment: 09/28/17   OT Start Time: 1055  OT Stop Time: 1136  OT Total Time (min): 41 min    Billable Minutes:  Self Care/Home Management 26 and Neuromuscular Re-education 15    General Precautions: Standard, aspiration, fall  Orthopedic Precautions: N/A  Braces: N/A    Do you have any cultural, spiritual, Jewish conflicts, given your current situation?: Restorationism    Subjective:  Communicated with nurse, MD and NP prior to session.  Patient:  "I am dizzy."  Pain/Comfort  Pain Rating 1: 0/10  Pain Rating Post-Intervention 1: 0/10    Objective:  Patient found with: peripheral IV, telemetry, pulse ox (continuous), SCD, bed alarm, blood pressure cuff  Family not present.    Functional Mobility:  Bed Mobility:  Rolling/Turning Right: Contact guard assistance  Supine to Sit: Maximum Assistance (from the right side)    Transfers:   Sit <> Stand Assistance: Maximum Assistance  Sit <> Stand Assistive Device: No Assistive Device  Bed <> Chair Technique: Stand Pivot  Bed <> Chair Transfer Assistance: Moderate Assistance  Bed <> Chair Assistive Device: No Assistive Device    Activities of Daily Living:  UE Dressing Level of Assistance: Moderate assistance (while seated EOB with assistance for balance and fasteners)  LE Dressing Level of Assistance: Maximum assistance (with assistance for balance and fasteners)  Grooming Position: Standing  Grooming Level of Assistance: Moderate assistance      Additional Treatment:   Patient education provided on role of OT and need for rehab upon discharge.  Patient verbalizing understanding via teach back method. Patient and family instructed on need to call for assistance for toileting needs and when getting up.  Continued education, patient/ family training recommended.  Patient alert and oriented x 3; able to follow 4/4 one step commands.  SBA with static sitting balance EOB; mod " "assist with dynamic sitting balance.   Patient attentive and interactive throughout the session. Patient's functional status and disposition recommendation discussed with nsg, patient and MD.  White board updated in patient's room.  OT asked if there were any other questions; patient/ family had no further questions.     AM-PAC 6 CLICK ADL   How much help from another person does this patient currently need?   1 = Unable, Total/Dependent Assistance  2 = A lot, Maximum/Moderate Assistance  3 = A little, Minimum/Contact Guard/Supervision  4 = None, Modified [place]/Independent    Putting on and taking off regular lower body clothing? : 2  Bathing (including washing, rinsing, drying)?: 2  Toileting, which includes using toilet, bedpan, or urinal? : 2  Putting on and taking off regular upper body clothing?: 2  Taking care of personal grooming such as brushing teeth?: 2  Eating meals?: 3  Total Score: 13     AM-PAC Raw Score CMS "G-Code Modifier Level of Impairment Assistance   6 % Total / Unable   7 - 8 CM 80 - 100% Maximal Assist   9-13 CL 60 - 80% Moderate Assist   14 - 19 CK 40 - 60% Moderate Assist   20 - 22 CJ 20 - 40% Minimal Assist   23 CI 1-20% SBA / CGA   24 CH 0% Independent/ Mod I       Patient left supine with all lines intact and call button in reach    Assessment:  Otis Salcido is a 67 y.o. male with a medical diagnosis of ICH and presents with performance deficits of physical skills including impaired balance, mobility, dexterity, fine motor coordination, gross motor coordination, and endurance; demonstrating performance deficits of cognitive skills including impaired  attention, problem solving, sequencing and memory all resulting in inability organizing occupational performance in a timely and safe manner; demonstrating performance deficits of psychosocial skills including impairments of interpersonal interactions and coping strategies which are skills necessary to successfully and " appropriately participate in everyday tasks and social situations.  These performance deficits have resulted in activity limitations including but not limited to:   bed mobility, transfers, ascending/ descending stairs, walking short and long distances, walking around obstacles, transitional movement patterns (kneeling, bending); eating, upper body dressing, lower body dressing, brushing teeth, toileting, bathing, carrying objects, typing, writing, reading, balancing checkbook, shopping, meal preparation, fishing and hunting.   Patient's role as father, grandfather, and independent caretaker for self has been affected. Patient will benefit from skilled OT services to maximize level of independence with self-care skills and functional mobility.  Will benefit from Rehab.  Improvements note with standing balance on this date.     Rehab identified problem list/impairments: Rehab identified problem list/impairments: impaired endurance, impaired self care skills, impaired functional mobilty, gait instability, impaired balance, decreased coordination, impaired coordination, impaired fine motor    Rehab potential is good.    Activity tolerance: Good    Discharge recommendations: Discharge Facility/Level Of Care Needs: rehabilitation facility     Barriers to discharge: Barriers to Discharge: Inaccessible home environment, Decreased caregiver support    Equipment recommendations: 3-in-1 commode, bath bench     GOALS:    Occupational Therapy Goals        Problem: Occupational Therapy Goal    Goal Priority Disciplines Outcome Interventions   Occupational Therapy Goal     OT, PT/OT     Description:  Goals set 9/27 to be addressed for 7 days with expiration date, 10/4:  Patient will increase functional independence with ADLs by performing:    Patient will demonstrate rolling to the right with SBA assist.  Not met   Patient will demonstrate rolling to the left with SBA assist.   Not met  Patient will demonstrate supine -sit with  SBA  assist.   Not met  Patient will demonstrate stand pivot transfers with min assist.   Not met  Patient will demonstrate grooming while standing with min assist.   Not met  Patient will demonstrate upper body dressing with SBA assist while seated EOB.   Not met  Patient will demonstrate lower body dressing with min assist while seated EOB.   Not met  Patient will demonstrate toileting with min assist.   Not met  Patient's family / caregiver will demonstrate independence and safety with assisting patient with self-care skills and functional mobility.     Not met  Patient and/or patient's family will verbalize understanding of stroke prevention guidelines, personal risk factors and stroke warning signs via teachback method.  Not met                           Plan:  Patient to be seen 6 x/week to address the above listed problems via self-care/home management, therapeutic exercises, cognitive retraining, sensory integration, therapeutic groups, neuromuscular re-education, therapeutic activities  Plan of Care expires: 10/26/17  Plan of Care reviewed with: patient (nurse, MD )         JASMINA Eid  09/28/2017

## 2017-09-28 NOTE — PROGRESS NOTES
[facility]  Vascular Neurology  Comprehensive Stroke Center  Progress Note    Assessment/Plan:     68 yo M with PMHx of HTN, HLD, previous PEs on rivaroxaban [place] [facility] [place]27/17 with sudden onset of headache, vertigo, and nausea with multiple episodes of emesis.  CT head showing ICH approx 2.6 cm.  No falls despite vertigo.  No previous history of stroke.  Symptoms are persistent but have not worsened since onset.  Now on nicardipine gtt for BP control.    9/28/17 patient neurologically stable, off cardene, MRI pending, plans for stepdown    Nontraumatic intracerebral hemorrhage of cerebellum    68 yo M with PMHx HTN, HLD, PEs on rivaroxaban, and prostate cancer presents to [facility] 09/27/17 after sudden onset of headache, vertigo, and n/v approx 10-11 pm 09/26/17.  CT head showing 2.6 cm L cerebellar hemorrhage.  Neuro ICU and NSGY following.    Antiplatelets: none, ICH, hold anticoagulation for at least 1 week  Statins: atorvastatin 40  SBP <160  DVT ppx: SCDs  PT/OT and speech to evaluate and treat  Neuro checks q1h          Vasogenic cerebral edema    2/2 infarct  Evident on imaging        Acute deep vein thrombosis (DVT) of proximal vein of left lower extremity    US LE negative        History of blood clots    -PE in 2012  -DVT in 2016  -hx of prostate cancer  -will hold anticoagulation for at least 1 week        High cholesterol    -Lipid panel pending  -Atorvastatin 40 mg po qd        HTN (hypertension)    -Possible etiology of ICH  -off cardene  -slowly add home medications for BP control        Prostate cancer    -Risk factor for increased clotting  -follow up with urologist and oncologist  -history of PE 2012  -history of DVT 2016  -recommend holding xarelto for at least 1 week due to ICH            Neurologic Chief Complaint: L cerebellar ICH    Subjective:     Interval History: Patient is seen for follow-up neurological assessment and treatment recommendations: NAEON, patient  without new complaints    HPI, Past Medical, Family, and Social History remains the same as documented in the initial encounter.     Review of Systems   Constitutional: Negative for chills and fever.   Eyes: Negative for visual disturbance.   Respiratory: Negative for cough and shortness of breath.    Cardiovascular: Negative for chest pain and palpitations.   Gastrointestinal: Negative for nausea and vomiting.   Neurological: Negative for facial asymmetry, speech difficulty, weakness and numbness.     Scheduled Meds:   amlodipine  10 mg Oral Daily    atorvastatin  40 mg Oral QHS    hydrALAZINE  50 mg Oral Q8H    levetiracetam  500 mg Oral BID    lisinopril  40 mg Oral Daily    polyethylene glycol  17 g Oral Daily    senna-docusate 8.6-50 mg  1 tablet Oral BID    sodium chloride 0.9%  3 mL Intravenous Q8H     Continuous Infusions:   sodium chloride 0.9% 75 mL/hr at 09/28/17 1200     PRN Meds:acetaminophen, hydrALAZINE, labetalol, magnesium oxide, magnesium oxide, ondansetron, potassium chloride 10%, potassium chloride 10%, potassium chloride 10%, potassium, sodium phosphates, potassium, sodium phosphates, potassium, sodium phosphates    Objective:     Vital Signs (Most Recent):  Temp: 97.8 °F (36.6 °C) (09/28/17 1100)  Pulse: (!) 53 (09/28/17 1200)  Resp: 17 (09/28/17 1200)  BP: (!) 155/68 (09/28/17 1200)  SpO2: 99 % (09/28/17 1200)       Vital Signs Range (Last 24H):  Temp:  [97.8 °F (36.6 °C)-98.9 °F (37.2 °C)]   Pulse:  [50-84]   Resp:  [15-33]   BP: (117-156)/(54-97)   SpO2:  [97 %-100 %]        Physical Exam   Constitutional: He is oriented to person, place, and time. He appears well-developed and well-nourished. No distress.   HENT:   Head: Normocephalic and atraumatic.   Eyes: EOM are normal. Pupils are equal, round, and reactive to light.   Cardiovascular: Bradycardia present.    Pulmonary/Chest: Effort normal. No respiratory distress.   Musculoskeletal: Normal range of motion.   Neurological: He is  alert and oriented to person, place, and time.   Skin: Skin is warm and dry.   Vitals reviewed.      Neurological Exam:   LOC: alert and follows requests  Language: No aphasia  Speech: No dysarthria  Orientation: Person, Place, Time  Memory: Recent memory intact, Remote memory intact, Age correct, Month correct  Visual Fields (CN II): Full  EOM (CN III, IV, VI): Full/intact  Pupils (CN III, IV, VI): PERRL  Facial Sensation (CN V): Symmetric  Facial Movement (CN VII): symmetric facial expression  Hearing (CN VIII): intact bilaterally  Motor*: Arm Left:  Normal (5/5), Leg Left:   Normal (5/5), Arm Right:   Normal (5/5), Leg Right:   Normal (5/5)  Cerebellar*: Finger/Nose Abnormal - left upper, Heel/Shin Abnormal - left lower  Sensation: intact to light touch, temperature and vibration  Tone: Arm-Left: normal; Leg-Left: normal; Arm-Right: normal; Leg-Right: normal    NIH Stroke Scale:    Level of Consciousness: 0 - alert  LOC Questions: 0 - answers both correctly  LOC Commands: 0 - performs both correctly  Best Gaze: 0 - normal  Visual: 0 - no visual loss  Facial Palsy: 0 - normal  Motor Left Arm: 0 - no drift  Motor Right Arm: 0 - no drift  Motor Left Le - no drift  Motor Right Le - no drift  Limb Ataxia: 2 - present in two limbs  Sensory: 0 - normal  Best Language: 0 - no aphasia  Dysarthria: 0 - normal articulation  Extinction and Inattention: 0 - no neglect  NIH Stroke Scale Total: 2      Laboratory:  CMP:   Recent Labs  Lab 17   CALCIUM 8.8   ALBUMIN 2.9*   PROT 6.9      K 3.3*   CO2 29      BUN 7*   CREATININE 0.8   ALKPHOS 83   ALT 11   AST 16   BILITOT 0.6     CBC:   Recent Labs  Lab 17   WBC 5.35   RBC 4.36*   HGB 12.4*   HCT 39.3*      MCV 90   MCH 28.4   MCHC 31.6*     Lipid Panel:   Recent Labs  Lab 17   CHOL 177   LDLCALC 122.2   HDL 41   TRIG 69     Coagulation:   Recent Labs  Lab 17   INR 1.0   APTT 24.0     Platelet Aggregation  Study: No results for input(s): PLTAGG, PLTAGINTERP, PLTAGREGLACO, ADPPLTAGGREG in the last 168 hours.  Hgb A1C:   Recent Labs  Lab 09/28/17  0208   HGBA1C 4.9     TSH:   Recent Labs  Lab 09/28/17  0208   TSH 0.278*       Diagnostic Results:  I have personally reviewed:   Findings:     CT Head. Date: 09/27/17  No significant change from prior.    Left cerebellar acute/recent intraparenchymal hemorrhage relatively stable. No evidence for extra-axial extension.    Slight mass effect on the fourth ventricle without hydrocephalus.    Superimposed Age-appropriate generalized cerebral volume loss with mild/moderate  degree of patchy decreased attenuation supratentorial white matter suggestive for chronic microvascular ischemic change similarly seen on the prior.     Remote left parasagittal parietal/occipital infarct unchanged with stable bilateral thalamic remote lacunar type infarcts.          Elva Anaya PA-C  Comprehensive Stroke Center  Department of Vascular Neurology   [facility]

## 2017-09-28 NOTE — PLAN OF CARE
Problem: Occupational Therapy Goal  Goal: Occupational Therapy Goal  Goals set 9/27 to be addressed for 7 days with expiration date, 10/4:  Patient will increase functional independence with ADLs by performing:    Patient will demonstrate rolling to the right with SBA assist.  Not met   Patient will demonstrate rolling to the left with SBA assist.   Not met  Patient will demonstrate supine -sit with SBA  assist.   Not met  Patient will demonstrate stand pivot transfers with min assist.   Not met  Patient will demonstrate grooming while standing with min assist.   Not met  Patient will demonstrate upper body dressing with SBA assist while seated EOB.   Not met  Patient will demonstrate lower body dressing with min assist while seated EOB.   Not met  Patient will demonstrate toileting with min assist.   Not met  Patient's family / caregiver will demonstrate independence and safety with assisting patient with self-care skills and functional mobility.     Not met  Patient and/or patient's family will verbalize understanding of stroke prevention guidelines, personal risk factors and stroke warning signs via teachback method.  Not met          Goals remain appropriate.  JASMINA Kidd  9/28/2017

## 2017-09-28 NOTE — PROGRESS NOTES
[facility]  Neurosurgery  Progress Note    Subjective:     History of Present Illness: 67M with PMH of DVTs (on Xarelto), TIA (on ASA 81), HTN, HLD who presented to the ED for HA with nausea and vomiting. On presentation he demonstrated dysmetria L>RUEs, but was otherwise intact, AOx3, GCS15. CTH shows cerebellar ICH with ICH score of 1. He will be admitted to the [facility] for further management with NSGY following.    Post-Op Info:  * No surgery found *         Interval History:   No acute events overnight.   Repeat CT scans stable.   Neuro exam stable.     Medications:  Continuous Infusions:   sodium chloride 0.9% 75 mL/hr at 09/28/17 0701    nicardipine Stopped (09/27/17 1330)     Scheduled Meds:   amlodipine  10 mg Oral Daily    hydrALAZINE  50 mg Oral Q8H    levetiracetam  500 mg Oral BID    lisinopril  40 mg Oral Daily    polyethylene glycol  17 g Oral Daily    senna-docusate 8.6-50 mg  1 tablet Oral BID    sodium chloride 0.9%  3 mL Intravenous Q8H     PRN Meds:acetaminophen, hydrALAZINE, labetalol, magnesium oxide, magnesium oxide, ondansetron, potassium chloride 10%, potassium chloride 10%, potassium chloride 10%, potassium, sodium phosphates, potassium, sodium phosphates, potassium, sodium phosphates     Review of Systems  Objective:     Weight: 82.1 kg (181 lb)  Body mass index is 28.35 kg/m².  Vital Signs (Most Recent):  Temp: 98.9 °F (37.2 °C) (09/28/17 0701)  Pulse: (!) 56 (09/28/17 0751)  Resp: (!) 33 (09/28/17 0751)  BP: 136/63 (09/28/17 0701)  SpO2: 100 % (09/28/17 0751) Vital Signs (24h Range):  Temp:  [98 °F (36.7 °C)-98.9 °F (37.2 °C)] 98.9 °F (37.2 °C)  Pulse:  [52-84] 56  Resp:  [15-33] 33  SpO2:  [95 %-100 %] 100 %  BP: (117-153)/(54-97) 136/63     Neurosurgery Physical Exam  Alert and oriented to person, place and time  Eyes open spontaneously  Responds appropriately to questions  Follows commands   PERRL, EOMI  Face symmetric  Tongue midline  Moves all extremities  with good strength  Sensation intact to light touch in all extremities  Slight dysmetria in LUE, improved from yesterday      Recent Labs  Lab 09/27/17  0421 09/28/17  0208   * 95    141   K 3.5 3.3*    106   CO2 26 29   BUN 7* 7*   CREATININE 0.8 0.8   CALCIUM 9.5 8.8   MG  --  1.8       Recent Labs  Lab 09/27/17  0421 09/28/17  0208   INR 1.0 1.0   APTT 24.9 24.0     All pertinent labs from the last 24 hours have been reviewed.    Significant Diagnostics:  CT: Ct Head Without Contrast    Result Date: 9/27/2017   No significant change from prior. Left cerebellar acute/recent intraparenchymal hemorrhage relatively stable. No evidence for extra-axial extension. Slight mass effect on the fourth ventricle without hydrocephalus. Superimposed Age-appropriate generalized cerebral volume loss with mild/moderate  degree of patchy decreased attenuation supratentorial white matter suggestive for chronic microvascular ischemic change similarly seen on the prior. Remote left parasagittal parietal/occipital infarct unchanged with stable bilateral thalamic remote lacunar type infarcts. No evidence for new hemorrhage or definite new abnormal parenchymal attenuation. Clinical correlation and  continued followup advised Electronically signed by: CAROL FOSS DO Date:     09/27/17 Time:    10:34   MRI: No results found in the last 24 hours.    Assessment/Plan:     Nontraumatic intracerebral hemorrhage of cerebellum    67M on xarelto for DVT with PMH HTN, prostate CA, HLD, TIAs (on ASA) who presents with cerebellar ICH (ICH score 1).    Neuro:  --continue medical management per NCC  --Q1h neuro checks  --Repeat head CT scan is stable   --HOB >30  --Call NSGY with any acute changes in neuro exam    CVS:  --SBP goals <160 on cardene (hydralazine, labetalol, amlodipine PRN)    Pulm:  --Aggressive pulmonary management  --Insentive spirometry & Duonebs PRN    Heme/onc/ID:  --CBC/CMP/Coags daily  --PCC to reverse  Xarelto    F/E/GI  --Na 140-150  --Diet pending swallow eval  --PPI  --Laxatives for bowel care    Ppx:  -DVT ppx per NCC  -PPI    Dispo:  -PT/OT/OOB  -Speech eval  -Consult social work for dispo  - follow up in neurosurgery clinic in 1-2 monthswith MRI brain w/wo            Cayetano Barrientos, DO  Neurosurgery  [facility]

## 2017-09-28 NOTE — PROGRESS NOTES
[facility]  Neurocritical Care  Progress Note    Admit Date: 9/27/2017  Service Date: 09/28/2017  Length of Stay: 1    Subjective:     Chief Complaint: <principal problem not specified>    History of Present Illness: 67-year-old male presents to the ER with his significant other for evaluation of headache, nausea, vomiting, diaphoresis.  Symptoms were acute onset last night around 10 or 11 PM.  He has never had a headache like this before in his life.  He admits to nausea with multiple episodes of emesis at home and in the waiting room.  The patient's significant other reports that throughout the evening he has not appeared himself.  He has been very lethargic and required assistance more than normal.  Patient denies any chest pain shortness of breath fever, chills    Hospital Course: 9/27 Admit NCC, s/p left cerebellar hemorrhage  9/28: NAEON, neuro exam stable, ready for step down     Interval History:  No acute events overnight, neuro exam remains stable, patient awaiting MRI.     Review of Systems   Constitutional: Negative.    HENT: Negative.    Eyes: Negative.    Respiratory: Negative.    Cardiovascular: Negative.    Gastrointestinal: Negative.    Endocrine: Negative.    Genitourinary: Negative.    Musculoskeletal: Negative.    Skin: Negative.    Allergic/Immunologic: Negative.    Neurological: Negative.    Hematological: Negative.    Psychiatric/Behavioral: Negative.      Objective:     Vitals:  Temp: 97.8 °F (36.6 °C) (09/28/17 1100)  Pulse: (!) 57 (09/28/17 1100)  Resp: 20 (09/28/17 1100)  BP: 138/65 (09/28/17 1100)  SpO2: 98 % (09/28/17 1100)    Temp:  [97.8 °F (36.6 °C)-98.9 °F (37.2 °C)] 97.8 °F (36.6 °C)  Pulse:  [50-84] 57  Resp:  [15-33] 20  SpO2:  [97 %-100 %] 98 %  BP: (117-156)/(54-97) 138/65              09/27 0701 - 09/28 0700  In: 1931.9 [I.V.:1931.9]  Out: 1475 [Urine:1475]    Physical Exam   Constitutional: He is oriented to person, place, and time. He appears  well-developed and well-nourished.   HENT:   Head: Normocephalic and atraumatic.   Eyes: EOM are normal. Pupils are equal, round, and reactive to light.   Cardiovascular: Normal rate and regular rhythm.    Pulmonary/Chest: Effort normal.   Musculoskeletal: Normal range of motion.   Neurological: He is alert and oriented to person, place, and time. GCS eye subscore is 4. GCS verbal subscore is 5. GCS motor subscore is 6.   Awake, alert, fully oriented  Following commands  Moving all extremities   LUE dysmetria   Skin: Skin is warm and dry.       Medications:  Continuous    sodium chloride 0.9% Last Rate: 75 mL/hr at 09/28/17 1100   Scheduled    amlodipine 10 mg Daily   hydrALAZINE 50 mg Q8H   levetiracetam 500 mg BID   lisinopril 40 mg Daily   polyethylene glycol 17 g Daily   senna-docusate 8.6-50 mg 1 tablet BID   sodium chloride 0.9% 3 mL Q8H   PRN    acetaminophen 650 mg Q4H PRN   hydrALAZINE 10 mg Q4H PRN   labetalol 10 mg Q4H PRN   magnesium oxide 800 mg PRN   magnesium oxide 800 mg PRN   ondansetron 4 mg Q12H PRN   potassium chloride 10% 40 mEq PRN   potassium chloride 10% 40 mEq PRN   potassium chloride 10% 60 mEq PRN   potassium, sodium phosphates 2 packet PRN   potassium, sodium phosphates 2 packet PRN   potassium, sodium phosphates 2 packet PRN     Today I personally reviewed pertinent medications, lines/drains/airways, imaging, cardiology, lab results, microbiology results, notably:        Assessment/Plan:     Neuro   Nontraumatic intracerebral hemorrhage of cerebellum    NSGY/Vascualr Surgery consult  Q1hr Neuro, SBP <140  Repeat head CT stable  MRI w/w/o pending to r/o amyloid  DDAVP for ASA hx  PCC for Xarelto Hx  Keppra bid ppx  PT/OT/SLP        Cardiac/Vascular   High cholesterol    Start atorvastatin 40 qhs         HTN (hypertension)    SBP <140  Cardene/labetolol/hydralazine PRN  EKG/Echo pending  Resume Home meds        Hematology   Acute deep vein thrombosis (DVT) of proximal vein of left lower  extremity    Repeat LE US, hx of DVT  Xarelto held with ICH        History of blood clots    Holding home xarelto,  Ultrasound negative on admission            Prophylaxis:  Venous Thromboembolism: mechanical  Stress Ulcer: None  Ventilator Pneumonia: no     Activity Orders          None        Full Code    Boo Beltran MD  Neurocritical Care  [facility]

## 2017-09-28 NOTE — PLAN OF CARE
SW met with Pt at bedside discussed rehab list. Pt had not had an opportunity to review rehab options. Pt advised that he would review list with his son alejandro.     Danielle Simon LMSW  Neurocritical Care   [facility]  [id number]

## 2017-09-28 NOTE — PROGRESS NOTES
Attempted to call MRI x1 to schedule MRI time.  No answer.  Have not heard back on time for scan.  Will attempt to call again at later time.

## 2017-09-28 NOTE — ASSESSMENT & PLAN NOTE
NSGY/Vascualr Surgery consult  Q1hr Neuro, SBP <140  MRI w/w/o pending to r/o amyloid  DDAVP for ASA hx  PCC for Xarelto Hx  Keppra bid ppx  PT/OT/SLP

## 2017-09-28 NOTE — PLAN OF CARE
Problem: SLP Goal  Goal: SLP Goal  Speech Language Pathology Goals  Goals expected to be met by 10/4  1.  Assess functional reading and writing skills  2.  Respond to categorization tasks with 80% accuracy  4.  REspond to mental manipulation tasks 75% accuracy.    5.  Respond to problem solving tasks with 80% accuracy        Outcome: Ongoing (interventions implemented as appropriate)  Pt seen for ongoing cognitive-linguistic therapy, including initiation of assessment of reading and visual spatial abilities. Pt's eyeglasses not present. Will further assess during next session when eyeglasses are present (SO plans to bring today).  KRISTINE Quinteros, CCC-SLP  Speech Language Pathologist  [phone]  9/28/2017

## 2017-09-28 NOTE — SUBJECTIVE & OBJECTIVE
Neurologic Chief Complaint: L cerebellar ICH    Subjective:     Interval History: Patient is seen for follow-up neurological assessment and treatment recommendations: NAEON, patient without new complaints    HPI, Past Medical, Family, and Social History remains the same as documented in the initial encounter.     Review of Systems   Constitutional: Negative for chills and fever.   Eyes: Negative for visual disturbance.   Respiratory: Negative for cough and shortness of breath.    Cardiovascular: Negative for chest pain and palpitations.   Gastrointestinal: Negative for nausea and vomiting.   Neurological: Negative for facial asymmetry, speech difficulty, weakness and numbness.     Scheduled Meds:   amlodipine  10 mg Oral Daily    atorvastatin  40 mg Oral QHS    hydrALAZINE  50 mg Oral Q8H    levetiracetam  500 mg Oral BID    lisinopril  40 mg Oral Daily    polyethylene glycol  17 g Oral Daily    senna-docusate 8.6-50 mg  1 tablet Oral BID    sodium chloride 0.9%  3 mL Intravenous Q8H     Continuous Infusions:   sodium chloride 0.9% 75 mL/hr at 09/28/17 1200     PRN Meds:acetaminophen, hydrALAZINE, labetalol, magnesium oxide, magnesium oxide, ondansetron, potassium chloride 10%, potassium chloride 10%, potassium chloride 10%, potassium, sodium phosphates, potassium, sodium phosphates, potassium, sodium phosphates    Objective:     Vital Signs (Most Recent):  Temp: 97.8 °F (36.6 °C) (09/28/17 1100)  Pulse: (!) 53 (09/28/17 1200)  Resp: 17 (09/28/17 1200)  BP: (!) 155/68 (09/28/17 1200)  SpO2: 99 % (09/28/17 1200)       Vital Signs Range (Last 24H):  Temp:  [97.8 °F (36.6 °C)-98.9 °F (37.2 °C)]   Pulse:  [50-84]   Resp:  [15-33]   BP: (117-156)/(54-97)   SpO2:  [97 %-100 %]        Physical Exam   Constitutional: He is oriented to person, place, and time. He appears well-developed and well-nourished. No distress.   HENT:   Head: Normocephalic and atraumatic.   Eyes: EOM are normal. Pupils are equal, round, and  reactive to light.   Cardiovascular: Bradycardia present.    Pulmonary/Chest: Effort normal. No respiratory distress.   Musculoskeletal: Normal range of motion.   Neurological: He is alert and oriented to person, place, and time.   Skin: Skin is warm and dry.   Vitals reviewed.      Neurological Exam:   LOC: alert and follows requests  Language: No aphasia  Speech: No dysarthria  Orientation: Person, Place, Time  Memory: Recent memory intact, Remote memory intact, Age correct, Month correct  Visual Fields (CN II): Full  EOM (CN III, IV, VI): Full/intact  Pupils (CN III, IV, VI): PERRL  Facial Sensation (CN V): Symmetric  Facial Movement (CN VII): symmetric facial expression  Hearing (CN VIII): intact bilaterally  Motor*: Arm Left:  Normal (5/5), Leg Left:   Normal (5/5), Arm Right:   Normal (5/5), Leg Right:   Normal (5/5)  Cerebellar*: Finger/Nose Abnormal - left upper, Heel/Shin Abnormal - left lower  Sensation: intact to light touch, temperature and vibration  Tone: Arm-Left: normal; Leg-Left: normal; Arm-Right: normal; Leg-Right: normal    NIH Stroke Scale:    Level of Consciousness: 0 - alert  LOC Questions: 0 - answers both correctly  LOC Commands: 0 - performs both correctly  Best Gaze: 0 - normal  Visual: 0 - no visual loss  Facial Palsy: 0 - normal  Motor Left Arm: 0 - no drift  Motor Right Arm: 0 - no drift  Motor Left Le - no drift  Motor Right Le - no drift  Limb Ataxia: 2 - present in two limbs  Sensory: 0 - normal  Best Language: 0 - no aphasia  Dysarthria: 0 - normal articulation  Extinction and Inattention: 0 - no neglect  NIH Stroke Scale Total: 2      Laboratory:  CMP:   Recent Labs  Lab 17  0208   CALCIUM 8.8   ALBUMIN 2.9*   PROT 6.9      K 3.3*   CO2 29      BUN 7*   CREATININE 0.8   ALKPHOS 83   ALT 11   AST 16   BILITOT 0.6     CBC:   Recent Labs  Lab 17  0208   WBC 5.35   RBC 4.36*   HGB 12.4*   HCT 39.3*      MCV 90   MCH 28.4   MCHC 31.6*     Lipid  Panel:   Recent Labs  Lab 09/28/17  0208   CHOL 177   LDLCALC 122.2   HDL 41   TRIG 69     Coagulation:   Recent Labs  Lab 09/28/17 0208   INR 1.0   APTT 24.0     Platelet Aggregation Study: No results for input(s): PLTAGG, PLTAGINTERP, PLTAGREGLACO, ADPPLTAGGREG in the last 168 hours.  Hgb A1C:   Recent Labs  Lab 09/28/17 0208   HGBA1C 4.9     TSH:   Recent Labs  Lab 09/28/17 0208   TSH 0.278*       Diagnostic Results:  I have personally reviewed:   Findings:     CT Head. Date: 09/27/17  No significant change from prior.    Left cerebellar acute/recent intraparenchymal hemorrhage relatively stable. No evidence for extra-axial extension.    Slight mass effect on the fourth ventricle without hydrocephalus.    Superimposed Age-appropriate generalized cerebral volume loss with mild/moderate  degree of patchy decreased attenuation supratentorial white matter suggestive for chronic microvascular ischemic change similarly seen on the prior.     Remote left parasagittal parietal/occipital infarct unchanged with stable bilateral thalamic remote lacunar type infarcts.

## 2017-09-28 NOTE — SUBJECTIVE & OBJECTIVE
Interval History:  No acute events overnight, neuro exam remains stable, patient awaiting MRI.     Review of Systems   Constitutional: Negative.    HENT: Negative.    Eyes: Negative.    Respiratory: Negative.    Cardiovascular: Negative.    Gastrointestinal: Negative.    Endocrine: Negative.    Genitourinary: Negative.    Musculoskeletal: Negative.    Skin: Negative.    Allergic/Immunologic: Negative.    Neurological: Negative.    Hematological: Negative.    Psychiatric/Behavioral: Negative.      Objective:     Vitals:  Temp: 97.8 °F (36.6 °C) (09/28/17 1100)  Pulse: (!) 57 (09/28/17 1100)  Resp: 20 (09/28/17 1100)  BP: 138/65 (09/28/17 1100)  SpO2: 98 % (09/28/17 1100)    Temp:  [97.8 °F (36.6 °C)-98.9 °F (37.2 °C)] 97.8 °F (36.6 °C)  Pulse:  [50-84] 57  Resp:  [15-33] 20  SpO2:  [97 %-100 %] 98 %  BP: (117-156)/(54-97) 138/65              09/27 0701 - 09/28 0700  In: 1931.9 [I.V.:1931.9]  Out: 1475 [Urine:1475]    Physical Exam   Constitutional: He is oriented to person, place, and time. He appears well-developed and well-nourished.   HENT:   Head: Normocephalic and atraumatic.   Eyes: EOM are normal. Pupils are equal, round, and reactive to light.   Cardiovascular: Normal rate and regular rhythm.    Pulmonary/Chest: Effort normal.   Musculoskeletal: Normal range of motion.   Neurological: He is alert and oriented to person, place, and time. GCS eye subscore is 4. GCS verbal subscore is 5. GCS motor subscore is 6.   Awake, alert, fully oriented  Following commands  Moving all extremities   LUE dysmetria   Skin: Skin is warm and dry.       Medications:  Continuous    sodium chloride 0.9% Last Rate: 75 mL/hr at 09/28/17 1100   Scheduled    amlodipine 10 mg Daily   hydrALAZINE 50 mg Q8H   levetiracetam 500 mg BID   lisinopril 40 mg Daily   polyethylene glycol 17 g Daily   senna-docusate 8.6-50 mg 1 tablet BID   sodium chloride 0.9% 3 mL Q8H   PRN    acetaminophen 650 mg Q4H PRN   hydrALAZINE 10 mg Q4H PRN   labetalol  10 mg Q4H PRN   magnesium oxide 800 mg PRN   magnesium oxide 800 mg PRN   ondansetron 4 mg Q12H PRN   potassium chloride 10% 40 mEq PRN   potassium chloride 10% 40 mEq PRN   potassium chloride 10% 60 mEq PRN   potassium, sodium phosphates 2 packet PRN   potassium, sodium phosphates 2 packet PRN   potassium, sodium phosphates 2 packet PRN     Today I personally reviewed pertinent medications, lines/drains/airways, imaging, cardiology, lab results, microbiology results, notably:

## 2017-09-28 NOTE — SUBJECTIVE & OBJECTIVE
Interval History:   No acute events overnight.   Repeat CT scans stable.   Neuro exam stable.     Medications:  Continuous Infusions:   sodium chloride 0.9% 75 mL/hr at 09/28/17 0701    nicardipine Stopped (09/27/17 1330)     Scheduled Meds:   amlodipine  10 mg Oral Daily    hydrALAZINE  50 mg Oral Q8H    levetiracetam  500 mg Oral BID    lisinopril  40 mg Oral Daily    polyethylene glycol  17 g Oral Daily    senna-docusate 8.6-50 mg  1 tablet Oral BID    sodium chloride 0.9%  3 mL Intravenous Q8H     PRN Meds:acetaminophen, hydrALAZINE, labetalol, magnesium oxide, magnesium oxide, ondansetron, potassium chloride 10%, potassium chloride 10%, potassium chloride 10%, potassium, sodium phosphates, potassium, sodium phosphates, potassium, sodium phosphates     Review of Systems  Objective:     Weight: 82.1 kg (181 lb)  Body mass index is 28.35 kg/m².  Vital Signs (Most Recent):  Temp: 98.9 °F (37.2 °C) (09/28/17 0701)  Pulse: (!) 56 (09/28/17 0751)  Resp: (!) 33 (09/28/17 0751)  BP: 136/63 (09/28/17 0701)  SpO2: 100 % (09/28/17 0751) Vital Signs (24h Range):  Temp:  [98 °F (36.7 °C)-98.9 °F (37.2 °C)] 98.9 °F (37.2 °C)  Pulse:  [52-84] 56  Resp:  [15-33] 33  SpO2:  [95 %-100 %] 100 %  BP: (117-153)/(54-97) 136/63     Neurosurgery Physical Exam  Alert and oriented to person, place and time  Eyes open spontaneously  Responds appropriately to questions  Follows commands   PERRL, EOMI  Face symmetric  Tongue midline  Moves all extremities with good strength  Sensation intact to light touch in all extremities  Slight dysmetria in LUE, improved from yesterday      Recent Labs  Lab 09/27/17  0421 09/28/17  0208   * 95    141   K 3.5 3.3*    106   CO2 26 29   BUN 7* 7*   CREATININE 0.8 0.8   CALCIUM 9.5 8.8   MG  --  1.8       Recent Labs  Lab 09/27/17  0421 09/28/17  0208   INR 1.0 1.0   APTT 24.9 24.0     All pertinent labs from the last 24 hours have been reviewed.    Significant Diagnostics:  CT:  Ct Head Without Contrast    Result Date: 9/27/2017   No significant change from prior. Left cerebellar acute/recent intraparenchymal hemorrhage relatively stable. No evidence for extra-axial extension. Slight mass effect on the fourth ventricle without hydrocephalus. Superimposed Age-appropriate generalized cerebral volume loss with mild/moderate  degree of patchy decreased attenuation supratentorial white matter suggestive for chronic microvascular ischemic change similarly seen on the prior. Remote left parasagittal parietal/occipital infarct unchanged with stable bilateral thalamic remote lacunar type infarcts. No evidence for new hemorrhage or definite new abnormal parenchymal attenuation. Clinical correlation and  continued followup advised Electronically signed by: CAROL FOSS DO Date:     09/27/17 Time:    10:34   MRI: No results found in the last 24 hours.

## 2017-09-28 NOTE — ASSESSMENT & PLAN NOTE
68 yo M with PMHx HTN, HLD, PEs on rivaroxaban, and prostate cancer [place] [facility] [place] after sudden onset of headache, vertigo, and n/v approx 10-11 pm 09/26/17.  CT head showing 2.6 cm L cerebellar hemorrhage.  Neuro ICU and NSGY following.    Antiplatelets: none, ICH, hold anticoagulation for at least 1 week  Statins: atorvastatin 40  SBP <160  DVT ppx: SCDs  PT/OT and speech to evaluate and treat  Neuro checks q1h

## 2017-09-28 NOTE — SUBJECTIVE & OBJECTIVE
Interval History:  (09/28/2017): Patient is seen for follow-up rehab evaluation and recommendations.  No acute events over night.  Partipcating with therapy.  Barriers for discharge/rehab admission: not medically ready for discharge.     HPI, Past Medical, Surgical, Family, and Social History remains the same as documented in the initial encounter.      Scheduled Medications:    amlodipine  10 mg Oral Daily    atorvastatin  40 mg Oral QHS    hydrALAZINE  50 mg Oral Q8H    levetiracetam  500 mg Oral BID    lisinopril  40 mg Oral Daily    polyethylene glycol  17 g Oral Daily    senna-docusate 8.6-50 mg  1 tablet Oral BID    sodium chloride 0.9%  3 mL Intravenous Q8H       PRN Medications: acetaminophen, hydrALAZINE, labetalol, magnesium oxide, magnesium oxide, ondansetron, potassium chloride 10%, potassium chloride 10%, potassium chloride 10%, potassium, sodium phosphates, potassium, sodium phosphates, potassium, sodium phosphates    Review of Systems   Constitutional: Negative for chills, fatigue and fever.   HENT: Negative for drooling, hearing loss, trouble swallowing and voice change.    Eyes: Negative for pain and visual disturbance.   Respiratory: Negative for cough, shortness of breath and wheezing.    Cardiovascular: Negative for chest pain and palpitations.   Gastrointestinal: Negative for abdominal pain, nausea and vomiting.   Genitourinary: Negative for difficulty urinating and flank pain.   Musculoskeletal: Positive for gait problem. Negative for arthralgias.   Skin: Negative for rash and wound.   Neurological: Positive for weakness. Negative for dizziness, numbness and headaches.   Psychiatric/Behavioral: Negative for agitation and hallucinations. The patient is not nervous/anxious.      Objective:     Vital Signs (Most Recent):  Temp: 97.8 °F (36.6 °C) (09/28/17 1100)  Pulse: (!) 53 (09/28/17 1200)  Resp: 17 (09/28/17 1200)  BP: (!) 155/68 (09/28/17 1200)  SpO2: 99 % (09/28/17 1200)    Vital  Signs (24h Range):  Temp:  [97.8 °F (36.6 °C)-98.9 °F (37.2 °C)] 97.8 °F (36.6 °C)  Pulse:  [50-84] 53  Resp:  [15-33] 17  SpO2:  [97 %-100 %] 99 %  BP: (117-156)/(54-70) 155/68     Physical Exam   Constitutional: He is oriented to person, place, and time. He appears well-developed and well-nourished. No distress.   HENT:   Head: Normocephalic and atraumatic.   Right Ear: External ear normal.   Left Ear: External ear normal.   Nose: Nose normal.   Eyes: Right eye exhibits no discharge. Left eye exhibits no discharge. No scleral icterus.   Neck: Normal range of motion.   Cardiovascular: Normal rate, regular rhythm and intact distal pulses.    Pulmonary/Chest: Effort normal. No respiratory distress. He has no wheezes.   Abdominal: Soft. He exhibits no distension. There is no tenderness.   Musculoskeletal: Normal range of motion. He exhibits no edema or tenderness.   Neurological: He is alert and oriented to person, place, and time.   -  Mental Status:  AAOx3.  Follows commands.  Answers correct age and .  Recent and remote memory intact.  -  Speech and language:  no aphasia or dysarthria.    -  Vision:  no hemianopsia or ptosis.    -  Facial movement (CN VII): symmetrical   -  Coordination:  Finger to nose exam:  RUE normal, LUE dysmetria.  -  Motor:  No pronator drift. RUE: 5/5.  LUE: 4/5.  -  Tone:  Normal.   -  Sensory:  Intact to light touch and pin prick.   Skin: Skin is warm and dry. No rash noted.   Psychiatric: He has a normal mood and affect. His behavior is normal. Thought content normal. Cognition and memory are impaired.   Vitals reviewed.    NEUROLOGICAL EXAMINATION:     MENTAL STATUS   Oriented to person, place, and time.

## 2017-09-28 NOTE — PT/OT/SLP PROGRESS
"Speech Language Pathology  Treatment    Otis Salcido   MRN: [id number]   Admitting Diagnosis: <principal problem not specified>    Diet recommendations: Solid Diet Level: Regular  Liquid Diet Level: Thin general aspiration precautions    SLP Treatment Date: 09/28/17  Speech Start Time: 0914     Speech Stop Time: 0935     Speech Total (min): 21 min       TREATMENT BILLABLE MINUTES:  Speech Therapy Individual 13 and Seld Care/Home Management Training 8    Has the patient been evaluated by SLP for swallowing? : Yes  Keep patient NPO?: No   General Precautions: Standard, aspiration, fall, vision impaired          Subjective:  "I've been forgetting things." Pt initially denied any noted changes in cognition or memory since incurring ICH, but admitted to "forgetting things" when SO reminded him that he has been having memory difficulty recently.     "I was concerned about that for at least a month." pt's girlfriend reported pt has been demonstrating memory difficulty for the past month.    Pain/Comfort  Pain Rating 1: 0/10    Objective:   Patient found with: peripheral IV, telemetry, pulse ox (continuous)    Pt was provided with 3 facts and encouraged to use repetition/rehearsal as a memory strategy to facilitate delayed recall.  Following a 5 minute delay, pt was able to recall facts with 83% accuracy.  Pt completed concrete convergent categorization tasks with 100% acc. Abstract tasks were completed with 80% acc. Ind'ly/100% given shon.  Pt was unable to read printed material at approx 12 point font size, which was attributed to Rx eyeglasses not being present. However, pt is reporting noted changes in visual since incurring ICH.  Pt was able to read a phrase in large font, but errors were present when reading a sentence in moderate sized font.  SO plans to bring eyeglasses today in order for further assessment of reading ability to be completed next session.  Pt used right/dominant hand to complete clock drawing test for " assessment of visual spatial abilities.  Impairments in attention and visual-spatial skills were evident.  Further assessment to be completed.  Education provided to pt and SO regarding role of SLP, cognitive deficits associated with ICH, assessment or reading and visual spatial abilities, and SLP POC.  Understanding was expressed, though pt's full insight and awareness of deficits may be decreased.    Assessment:  Otis Salcido is a 67 y.o. male with a medical diagnosis of <principal problem not specified> and presents with cognitive-linguistic and visual spatial deficits.     Discharge recommendations: Discharge Facility/Level Of Care Needs: rehabilitation facility     Goals:    SLP Goals        Problem: SLP Goal    Goal Priority Disciplines Outcome   SLP Goal     SLP Ongoing (interventions implemented as appropriate)   Description:  Speech Language Pathology Goals  Goals expected to be met by 10/4  1.  Assess functional reading and writing skills  2.  Respond to categorization tasks with 80% accuracy  4.  REspond to mental manipulation tasks 75% accuracy.    5.  Respond to problem solving tasks with 80% accuracy                          Plan:   Patient to be seen Therapy Frequency: 5 x/week   Plan of Care expires: 10/25/17  Plan of Care reviewed with: patient, significant other  SLP Follow-up?: Yes  SLP - Next Visit Date: 09/28/17           KRISTINE Quinteros, CCC-SLP  09/28/2017    KRISTINE Quinteros, CCC-SLP  Speech Language Pathologist  [phone]  9/28/2017

## 2017-09-29 LAB
ALBUMIN SERPL BCP-MCNC: 3.2 G/DL
ALP SERPL-CCNC: 98 U/L
ALT SERPL W/O P-5'-P-CCNC: 17 U/L
ANION GAP SERPL CALC-SCNC: 9 MMOL/L
AST SERPL-CCNC: 26 U/L
BASOPHILS # BLD AUTO: 0.02 K/UL
BASOPHILS NFR BLD: 0.4 %
BILIRUB SERPL-MCNC: 0.5 MG/DL
BUN SERPL-MCNC: 6 MG/DL
CALCIUM SERPL-MCNC: 9 MG/DL
CHLORIDE SERPL-SCNC: 107 MMOL/L
CO2 SERPL-SCNC: 21 MMOL/L
CREAT SERPL-MCNC: 0.8 MG/DL
DIASTOLIC DYSFUNCTION: NO
DIFFERENTIAL METHOD: NORMAL
EOSINOPHIL # BLD AUTO: 0 K/UL
EOSINOPHIL NFR BLD: 0.7 %
ERYTHROCYTE [DISTWIDTH] IN BLOOD BY AUTOMATED COUNT: 13.9 %
EST. GFR  (AFRICAN AMERICAN): >60 ML/MIN/1.73 M^2
EST. GFR  (NON AFRICAN AMERICAN): >60 ML/MIN/1.73 M^2
GLUCOSE SERPL-MCNC: 91 MG/DL
HCT VFR BLD AUTO: 46.3 %
HGB BLD-MCNC: 15 G/DL
LYMPHOCYTES # BLD AUTO: 1.5 K/UL
LYMPHOCYTES NFR BLD: 25.8 %
MAGNESIUM SERPL-MCNC: 2.3 MG/DL
MCH RBC QN AUTO: 29.1 PG
MCHC RBC AUTO-ENTMCNC: 32.4 G/DL
MCV RBC AUTO: 90 FL
MONOCYTES # BLD AUTO: 0.5 K/UL
MONOCYTES NFR BLD: 8.7 %
NEUTROPHILS # BLD AUTO: 3.6 K/UL
NEUTROPHILS NFR BLD: 64.2 %
PHOSPHATE SERPL-MCNC: 2.4 MG/DL
PLATELET # BLD AUTO: 152 K/UL
PMV BLD AUTO: 11.2 FL
POCT GLUCOSE: 86 MG/DL (ref 70–110)
POCT GLUCOSE: 92 MG/DL (ref 70–110)
POTASSIUM SERPL-SCNC: 4.6 MMOL/L
PROT SERPL-MCNC: 8.1 G/DL
RBC # BLD AUTO: 5.16 M/UL
RETIRED EF AND QEF - SEE NOTES: 60 (ref 55–65)
SODIUM SERPL-SCNC: 137 MMOL/L
WBC # BLD AUTO: 5.63 K/UL

## 2017-09-29 PROCEDURE — 83735 ASSAY OF MAGNESIUM: CPT

## 2017-09-29 PROCEDURE — 25000003 PHARM REV CODE 250: Performed by: NURSE PRACTITIONER

## 2017-09-29 PROCEDURE — 99233 SBSQ HOSP IP/OBS HIGH 50: CPT | Mod: ,,, | Performed by: NEUROLOGICAL SURGERY

## 2017-09-29 PROCEDURE — 97535 SELF CARE MNGMENT TRAINING: CPT

## 2017-09-29 PROCEDURE — 85025 COMPLETE CBC W/AUTO DIFF WBC: CPT

## 2017-09-29 PROCEDURE — 93306 TTE W/DOPPLER COMPLETE: CPT

## 2017-09-29 PROCEDURE — 25000003 PHARM REV CODE 250: Performed by: PHYSICIAN ASSISTANT

## 2017-09-29 PROCEDURE — 97530 THERAPEUTIC ACTIVITIES: CPT

## 2017-09-29 PROCEDURE — A4216 STERILE WATER/SALINE, 10 ML: HCPCS | Performed by: NURSE PRACTITIONER

## 2017-09-29 PROCEDURE — 93306 TTE W/DOPPLER COMPLETE: CPT | Mod: 26,,, | Performed by: INTERNAL MEDICINE

## 2017-09-29 PROCEDURE — 99233 SBSQ HOSP IP/OBS HIGH 50: CPT | Mod: GC,,, | Performed by: PSYCHIATRY & NEUROLOGY

## 2017-09-29 PROCEDURE — 99232 SBSQ HOSP IP/OBS MODERATE 35: CPT | Mod: ,,, | Performed by: ANESTHESIOLOGY

## 2017-09-29 PROCEDURE — 25000003 PHARM REV CODE 250: Performed by: STUDENT IN AN ORGANIZED HEALTH CARE EDUCATION/TRAINING PROGRAM

## 2017-09-29 PROCEDURE — 20600001 HC STEP DOWN PRIVATE ROOM

## 2017-09-29 PROCEDURE — 97116 GAIT TRAINING THERAPY: CPT

## 2017-09-29 PROCEDURE — 84100 ASSAY OF PHOSPHORUS: CPT

## 2017-09-29 PROCEDURE — 80053 COMPREHEN METABOLIC PANEL: CPT

## 2017-09-29 PROCEDURE — 92507 TX SP LANG VOICE COMM INDIV: CPT

## 2017-09-29 PROCEDURE — 99232 SBSQ HOSP IP/OBS MODERATE 35: CPT | Mod: ,,, | Performed by: NURSE PRACTITIONER

## 2017-09-29 RX ADMIN — ACETAMINOPHEN 650 MG: 325 TABLET ORAL at 09:09

## 2017-09-29 RX ADMIN — POLYETHYLENE GLYCOL 3350 17 G: 17 POWDER, FOR SOLUTION ORAL at 09:09

## 2017-09-29 RX ADMIN — STANDARDIZED SENNA CONCENTRATE AND DOCUSATE SODIUM 1 TABLET: 8.6; 5 TABLET, FILM COATED ORAL at 09:09

## 2017-09-29 RX ADMIN — HYDRALAZINE HYDROCHLORIDE 75 MG: 50 TABLET ORAL at 02:09

## 2017-09-29 RX ADMIN — Medication 10 ML: at 10:09

## 2017-09-29 RX ADMIN — HYDRALAZINE HYDROCHLORIDE 75 MG: 50 TABLET ORAL at 09:09

## 2017-09-29 RX ADMIN — POTASSIUM & SODIUM PHOSPHATES POWDER PACK 280-160-250 MG 2 PACKET: 280-160-250 PACK at 09:09

## 2017-09-29 RX ADMIN — Medication 3 ML: at 02:09

## 2017-09-29 RX ADMIN — ATORVASTATIN CALCIUM 40 MG: 20 TABLET, FILM COATED ORAL at 09:09

## 2017-09-29 RX ADMIN — HYDRALAZINE HYDROCHLORIDE 75 MG: 50 TABLET ORAL at 06:09

## 2017-09-29 RX ADMIN — AMLODIPINE BESYLATE 10 MG: 10 TABLET ORAL at 09:09

## 2017-09-29 RX ADMIN — POTASSIUM & SODIUM PHOSPHATES POWDER PACK 280-160-250 MG 2 PACKET: 280-160-250 PACK at 06:09

## 2017-09-29 RX ADMIN — LEVETIRACETAM 500 MG: 500 TABLET ORAL at 09:09

## 2017-09-29 RX ADMIN — LISINOPRIL 40 MG: 20 TABLET ORAL at 09:09

## 2017-09-29 NOTE — PLAN OF CARE
Problem: Physical Therapy Goal  Goal: Physical Therapy Goal  PT goals until 10/6/17     1. Pt supine to sit with CGA-not met  2. Pt sit to supine with CGA-not met  3. Pt sit to stand with or without RW as needed for safety with CGA-not met  4. Pt to perform gait 100ft with or without RW as needed for safety with CGA.-not met  5. Pt to transfer bed to/from bedside chair with CGA.-not met  6. Pt to perform B LE exs in sitting x 20 reps with handout.-not met           Goals remain appropriate.     Meera Campbell, PTA.  9/29/2017

## 2017-09-29 NOTE — PLAN OF CARE
Problem: SLP Goal  Goal: SLP Goal  Speech Language Pathology Goals  Goals expected to be met by 10/4  1.  Assess functional reading and writing skills  2.  Respond to categorization tasks with 80% accuracy  4.  REspond to mental manipulation tasks 75% accuracy.    5.  Respond to problem solving tasks with 80% accuracy        Outcome: Ongoing (interventions implemented as appropriate)  Continue current SLP POC. Goals remain appropriate.     KRISTINE Garner, CCC-SLP  [phone]  9/29/2017

## 2017-09-29 NOTE — PLAN OF CARE
Problem: Patient Care Overview  Goal: Plan of Care Review  Outcome: Ongoing (interventions implemented as appropriate)  POC reviewed with pt at 0400. Pt verbalized understanding. Questions and concerns addressed. No acute events overnight. Pt progressing toward goals. Will continue to monitor. See flowsheets for full assessment and VS info. MRI Completed

## 2017-09-29 NOTE — ASSESSMENT & PLAN NOTE
67M on xarelto for DVT with PMH HTN, prostate CA, HLD, TIAs (on ASA) who presents with cerebellar ICH (ICH score 1).    Neuro:  --continue medical management per primary team   --Q4h neuro checks  --Repeat head CT scan is stable   --HOB >30  --Call NSGY with any acute changes in neuro exam    CVS:  --SBP goals <160 on cardene (hydralazine, labetalol, amlodipine PRN)    Pulm:  --Aggressive pulmonary management  --Insentive spirometry & Duonebs PRN    Heme/onc/ID:  --CBC/CMP/Coags daily  --PCC to reverse Xarelto    F/E/GI  --Na 140-150  --Diet per swallow eval  --PPI  --Laxatives for bowel care    Ppx:  -DVT ppx per NCC  -PPI    Dispo:  -PT/OT/OOB  -Speech eval  -Consult social work for dispo  - follow up in neurosurgery clinic in 1-2 months with MRI brain w/wo

## 2017-09-29 NOTE — PLAN OF CARE
Problem: Patient Care Overview  Goal: Plan of Care Review  Outcome: Ongoing (interventions implemented as appropriate)  No acute events throughout the day, VS and assessment per flow sheet, patient progressing towards goal as tolerated. Plan of care reviewed with Otis Salcido and family, all concerns addressed. Will continue to monitor.     Pt being stepped-down later today.  No change in neuro exam.

## 2017-09-29 NOTE — PLAN OF CARE
Problem: Occupational Therapy Goal  Goal: Occupational Therapy Goal  Goals set 9/27 to be addressed for 7 days with expiration date, 10/4:  Patient will increase functional independence with ADLs by performing:    Patient will demonstrate rolling to the right with SBA assist.  Not met   Patient will demonstrate rolling to the left with SBA assist.   Not met  Patient will demonstrate supine -sit with SBA  assist.   Not met  Patient will demonstrate stand pivot transfers with min assist.   Not met  Patient will demonstrate grooming while standing with min assist.   Not met  Patient will demonstrate upper body dressing with SBA assist while seated EOB.   Not met  Patient will demonstrate lower body dressing with min assist while seated EOB.   Not met  Patient will demonstrate toileting with min assist.   Not met  Patient's family / caregiver will demonstrate independence and safety with assisting patient with self-care skills and functional mobility.     Not met  Patient and/or patient's family will verbalize understanding of stroke prevention guidelines, personal risk factors and stroke warning signs via teachback method.  Not met          Goals remain appropriate.  JASMINA Kidd  9/29/2017

## 2017-09-29 NOTE — SUBJECTIVE & OBJECTIVE
Interval History: NAEON, waiting for bed in step down unit.    Review of Systems   Constitutional: Negative.    HENT: Negative.    Eyes: Negative.    Respiratory: Negative.    Cardiovascular: Negative.    Gastrointestinal: Negative.    Endocrine: Negative.    Genitourinary: Negative.    Musculoskeletal: Positive for gait problem.   Skin: Negative.    Allergic/Immunologic: Negative.    Neurological: Positive for headaches.   Hematological: Negative.    Psychiatric/Behavioral: Negative.      Objective:     Vitals:  Temp: 99.1 °F (37.3 °C) (09/29/17 1100)  Pulse: 80 (09/29/17 1400)  Resp: 18 (09/29/17 1400)  BP: (!) 143/68 (09/29/17 1400)  SpO2: 95 % (09/29/17 1400)    Temp:  [97.6 °F (36.4 °C)-99.1 °F (37.3 °C)] 99.1 °F (37.3 °C)  Pulse:  [] 80  Resp:  [17-28] 18  SpO2:  [91 %-99 %] 95 %  BP: (134-177)/(63-80) 143/68         Oxygen Concentration (%):  [28] 28    09/28 0701 - 09/29 0700  In: 1805 [I.V.:1805]  Out: 2800 [Urine:2800]    Physical Exam   Constitutional: He is oriented to person, place, and time. He appears well-developed and well-nourished.   HENT:   Head: Normocephalic and atraumatic.   Eyes: EOM are normal. Pupils are equal, round, and reactive to light.   Cardiovascular: Normal rate and regular rhythm.    Pulmonary/Chest: Effort normal.   Musculoskeletal: Normal range of motion.   Neurological: He is alert and oriented to person, place, and time. GCS eye subscore is 4. GCS verbal subscore is 5. GCS motor subscore is 6.   Awake, alert, fully oriented  Following commands  Moving all extremities   LUE dysmetria  Ataxic gait, leans to the right   Skin: Skin is warm and dry.       Medications:  Continuous  sodium chloride 0.9% Last Rate: 75 mL/hr at 09/29/17 1400   Scheduled  amlodipine 10 mg Daily   atorvastatin 40 mg QHS   hydrALAZINE 75 mg Q8H   levetiracetam 500 mg BID   lisinopril 40 mg Daily   polyethylene glycol 17 g Daily   senna-docusate 8.6-50 mg 1 tablet BID   sodium chloride 0.9% 3 mL Q8H    PRN  acetaminophen 650 mg Q4H PRN   hydrALAZINE 10 mg Q4H PRN   labetalol 10 mg Q4H PRN   magnesium oxide 800 mg PRN   magnesium oxide 800 mg PRN   ondansetron 4 mg Q12H PRN   potassium chloride 10% 40 mEq PRN   potassium chloride 10% 40 mEq PRN   potassium chloride 10% 60 mEq PRN   potassium, sodium phosphates 2 packet PRN   potassium, sodium phosphates 2 packet PRN   potassium, sodium phosphates 2 packet PRN     Today I personally reviewed pertinent medications, lines/drains/airways, imaging, cardiology, lab results, microbiology results, notably:

## 2017-09-29 NOTE — PROGRESS NOTES
[facility]  Physical Medicine & Rehab  Progress Note    Patient Name: Otis Salcido  MRN: [id number]  Admission Date: 9/27/2017  Length of Stay: 2 days  Attending Physician: Apollo Murphy MD  Primary Care Provider: Primary Doctor No    Subjective:     Principal Problem:Nontraumatic intracerebral hemorrhage of cerebellum    Hospital Course:   9/27/17:  Evaluated by therapy.  Bed mobility min-maxA.  Sit to stand maxA and transfers maxA.  UBD maxA and LBD totalA.  Passed bedside swallow evaluation.  SLP recommending regular diet and thin liquids.  Found to have cognitive-linguistic impairments.    9/28/17: Participating with therapy.  Bed mobility CGA-MaxA.  Sit to stand MaxA and transfers ModA.  UBD ModA and LBD MaxA.    Interval History:  (09/29/2017): Patient is seen for follow-up rehab evaluation and recommendations.  No acute events over night.  Particpating with therapy.  Barriers for discharge/rehab admission: not medically ready for discharge.     HPI, Past Medical, Surgical, Family, and Social History remains the same as documented in the initial encounter.      Scheduled Medications:    amlodipine  10 mg Oral Daily    atorvastatin  40 mg Oral QHS    hydrALAZINE  75 mg Oral Q8H    levetiracetam  500 mg Oral BID    lisinopril  40 mg Oral Daily    polyethylene glycol  17 g Oral Daily    senna-docusate 8.6-50 mg  1 tablet Oral BID    sodium chloride 0.9%  3 mL Intravenous Q8H       PRN Medications: acetaminophen, hydrALAZINE, labetalol, magnesium oxide, magnesium oxide, ondansetron, potassium chloride 10%, potassium chloride 10%, potassium chloride 10%, potassium, sodium phosphates, potassium, sodium phosphates, potassium, sodium phosphates    Review of Systems   Constitutional: Negative for chills, fatigue and fever.   HENT: Negative for drooling, hearing loss, trouble swallowing and voice change.    Eyes: Negative for pain and visual disturbance.   Respiratory: Negative for cough,  shortness of breath and wheezing.    Cardiovascular: Negative for chest pain and palpitations.   Gastrointestinal: Negative for abdominal pain, nausea and vomiting.   Genitourinary: Negative for difficulty urinating and flank pain.   Musculoskeletal: Positive for gait problem. Negative for arthralgias.   Skin: Negative for rash and wound.   Neurological: Positive for weakness. Negative for dizziness, numbness and headaches.   Psychiatric/Behavioral: Negative for agitation and hallucinations. The patient is not nervous/anxious.      Objective:     Vital Signs (Most Recent):  Temp: 99.1 °F (37.3 °C) (17)  Pulse: 81 (17)  Resp: (!) 22 (17)  BP: 137/63 (17)  SpO2: 95 % (17)    Vital Signs (24h Range):  Temp:  [97.6 °F (36.4 °C)-99.1 °F (37.3 °C)] 99.1 °F (37.3 °C)  Pulse:  [] 81  Resp:  [17-28] 22  SpO2:  [91 %-99 %] 95 %  BP: (134-177)/(63-80) 137/63     Physical Exam   Constitutional: He appears well-developed and well-nourished. No distress.   HENT:   Head: Normocephalic and atraumatic.   Eyes: Right eye exhibits no discharge. Left eye exhibits no discharge. No scleral icterus.   Neck: Normal range of motion.   Cardiovascular: Normal rate, regular rhythm and intact distal pulses.    Pulmonary/Chest: Effort normal. No respiratory distress. He has no wheezes.   Abdominal: Soft. He exhibits no distension. There is no tenderness.   Musculoskeletal: Normal range of motion. He exhibits no edema or tenderness.   Neurological:   -  Mental Status:  AAOx3.  Follows commands.  Answers correct age and .  Recent and remote memory intact.  -  Speech and language:  no aphasia or dysarthria.    -  Vision:  no hemianopsia or ptosis.    -  Facial movement (CN VII): symmetrical   -  Coordination:  Finger to nose exam:  RUE normal, LUE dysmetria.  -  Motor:  No pronator drift. RUE: 5/5.  LUE: 4/5.  -  Tone:  Normal.   -  Sensory:  Intact to light touch and pin prick.    Skin: Skin is warm and dry. No rash noted.   Psychiatric: He has a normal mood and affect. His behavior is normal. Thought content normal. Cognition and memory are impaired.   Vitals reviewed.      Diagnostic Results:   Labs: Reviewed  US: Reviewed  CT: Reviewed  MRI: Reviewed  Assessment/Plan:      * Nontraumatic intracerebral hemorrhage of cerebellum    -  Presented with severe headache with associated N/V, vertigo, and diaphoresis.    -  CTH showed L cerebellar ICH.    -  Found to be hypertensive and started on Cardene infusion.    -  Neurosurgery and vascular neurology following.  -  Etiology likely hypertensive vs ischemic stroke with hemorrhagic transformation while on rivaroxaban.    Functional status: see hospital course  Cognitive/Speech/Language status:  Cognitive-Linguistic Impairment.  Nutrition/Swallow Status:  Passed bedside swallow evaluation.  SLP recommending regular diet and thin liquids.    Recommendations  -  Encourage mobility, OOB in chair at least 3 hours per day, and early ambulation as appropriate   -  PT/OT evaluate and treat  -  SLP speech and cognitive evaluate and treat  -  Monitor sleep disturbances and establish consistent sleep-wake cycle  -  Monitor for bowel and bladder dysfunction  -  Monitor for shoulder pain and subluxation  -  Monitor for spasticity  -  Monitor for and prevent skin breakdown and pressure ulcers  · Early mobility, repositioning/weight shifting every 20-30 minutes when sitting, turn patient every 2 hours, proper mattress/overlay and chair cushioning, pressure relief/heel protector boots  -  DVT prophylaxis:  ILANA, SCD  -  Reviewed discharge options (IP rehab, SNF, HH therapy, and OP therapy)        Acute deep vein thrombosis (DVT) of proximal vein of left lower extremity    -  On home rivaroxaban, holding for ICH  -  Repeat BLE US for history of DVT-negative.        History of blood clots    -  On home rivaroxaban, holding for ICH        Participating with  therapy.  Will follow and discuss with rehab team for rehab recommendation on Monday.      Juju Dela Cruz NP  Department of Physical Medicine & Rehab   [facility]

## 2017-09-29 NOTE — SUBJECTIVE & OBJECTIVE
Neurologic Chief Complaint: L Cerebellar ICH    Subjective:     Interval History: Patient is seen for follow-up neurological assessment and treatment recommendations: KAYY, Still c/o dizziness however states it is improving    HPI, Past Medical, Family, and Social History remains the same as documented in the initial encounter.     Review of Systems   Constitutional: Positive for fatigue. Negative for fever.   HENT: Negative for rhinorrhea and sore throat.    Respiratory: Negative for cough and shortness of breath.    Gastrointestinal: Negative for abdominal pain and nausea.   Genitourinary: Negative for dysuria and frequency.   Musculoskeletal: Negative for arthralgias and myalgias.   Neurological: Positive for dizziness. Negative for weakness and headaches.   Psychiatric/Behavioral: Negative for agitation and confusion.     Scheduled Meds:   amlodipine  10 mg Oral Daily    atorvastatin  40 mg Oral QHS    hydrALAZINE  75 mg Oral Q8H    levetiracetam  500 mg Oral BID    lisinopril  40 mg Oral Daily    polyethylene glycol  17 g Oral Daily    senna-docusate 8.6-50 mg  1 tablet Oral BID    sodium chloride 0.9%  3 mL Intravenous Q8H     Continuous Infusions:   sodium chloride 0.9% 75 mL/hr at 09/29/17 1100     PRN Meds:acetaminophen, hydrALAZINE, labetalol, magnesium oxide, magnesium oxide, ondansetron, potassium chloride 10%, potassium chloride 10%, potassium chloride 10%, potassium, sodium phosphates, potassium, sodium phosphates, potassium, sodium phosphates    Objective:     Vital Signs (Most Recent):  Temp: 99.1 °F (37.3 °C) (09/29/17 1100)  Pulse: 81 (09/29/17 1100)  Resp: (!) 22 (09/29/17 1100)  BP: 137/63 (09/29/17 1100)  SpO2: 95 % (09/29/17 1100)  BP Location: Left arm    Vital Signs Range (Last 24H):  Temp:  [97.6 °F (36.4 °C)-99.1 °F (37.3 °C)]   Pulse:  []   Resp:  [17-28]   BP: (134-177)/(63-80)   SpO2:  [91 %-100 %]   BP Location: Left arm    Physical Exam   Constitutional: He is oriented  to person, place, and time. He appears well-developed and well-nourished. No distress.   HENT:   Head: Normocephalic and atraumatic.   Eyes: Conjunctivae and EOM are normal.   Neck: Normal range of motion. No tracheal deviation present.   Pulmonary/Chest: Effort normal. No respiratory distress.   Musculoskeletal: Normal range of motion. He exhibits no tenderness.   Neurological: He is alert and oriented to person, place, and time. No sensory deficit. Coordination abnormal.   Skin: Skin is warm and dry. No rash noted.   Psychiatric: He has a normal mood and affect. His behavior is normal. Judgment and thought content normal.       Neurological Exam:   LOC: alert and follows requests  Language: No aphasia  Speech: Dysarthria  Orientation: Person, Place, Time  Visual Fields (CN II): Hemianopsia  right  EOM (CN III, IV, VI): Full/intact  Facial Sensation (CN V): Symmetric  Facial Movement (CN VII): symmetric facial expression  Tongue (CN XII): to midline  Motor*: Arm Left:  Normal (5/5), Leg Left:   Normal (5/5), Arm Right:   Normal (5/5), Leg Right:   Normal (5/5)  Cerebellar*: Finger/Nose Abnormal - BUE  Sensation: intact to light touch    NIH Stroke Scale:  Interval: 7 days or at discharge (whichever comes first)  Level of Consciousness: 0 - alert  LOC Questions: 0 - answers both correctly  LOC Commands: 0 - performs both correctly  Best Gaze: 0 - normal  Visual: 1 - partial hemianopia  Facial Palsy: 0 - normal  Motor Left Arm: 0 - no drift  Motor Right Arm: 0 - no drift  Motor Left Le - no drift  Motor Right Le - no drift  Limb Ataxia: 2 - present in two limbs  Sensory: 0 - normal  Best Language: 0 - no aphasia  Dysarthria: 0 - normal articulation  Extinction and Inattention: 0 - no neglect  NIH Stroke Scale Total: 3      Laboratory:  CMP:   Recent Labs  Lab 17  0132   CALCIUM 9.0   ALBUMIN 3.2*   PROT 8.1      K 4.6   CO2 21*      BUN 6*   CREATININE 0.8   ALKPHOS 98   ALT 17   AST 26    BILITOT 0.5     CBC:   Recent Labs  Lab 09/29/17  0132   WBC 5.63   RBC 5.16   HGB 15.0   HCT 46.3      MCV 90   MCH 29.1   MCHC 32.4     Lipid Panel:   Recent Labs  Lab 09/28/17  0208   CHOL 177   LDLCALC 122.2   HDL 41   TRIG 69     Coagulation:   Recent Labs  Lab 09/28/17  0208   INR 1.0   APTT 24.0     Hgb A1C:   Recent Labs  Lab 09/28/17  0208   HGBA1C 4.9     TSH:   Recent Labs  Lab 09/28/17  0208   TSH 0.278*       Diagnostic Results:  I have personally reviewed: CT Head., MRI Head., and US BLE  Findings:     CT Head. Date: 09/27/17  No significant change from prior.  Left cerebellar acute/recent intraparenchymal hemorrhage relatively stable. No evidence for extra-axial extension.  Slight mass effect on the fourth ventricle without hydrocephalus.  Superimposed Age-appropriate generalized cerebral volume loss with mild/moderate degree of patchy decreased attenuation supratentorial white matter suggestive for chronic microvascular ischemic change similarly seen on the prior.   Remote left parasagittal parietal/occipital infarct unchanged with stable bilateral thalamic remote lacunar type infarcts.    MRI Head. 9/28/17  1.  Acute parenchymal hemorrhage involving the paramedian superior left cerebellum with adjacent quan-hemorrhage edema and localized mass effect on the fourth ventricle without evidence of hydrocephalus.  Allowing for intrinsic T1 hyperintensity, no definite abnormal enhancement is identified within this region.  Further evaluation with repeat contrast-enhanced MRI examination can be performed following resolution of acute hemorrhage as clinically warranted.  2.  Findings suggestive of significant chronic microvascular ischemic change, remote infarction of the paramedian left occipital lobe as well as multiple remote lacunar type infarcts in the basal ganglia, thalami and sebastian.  Few small foci of increased diffusivity within the corona radiata without corresponding hypointense signal on  ADC maps likely reflecting sequela of prior infarction.    US BLE. 9/27/17  No evidence of deep venous thrombosis bilaterally.  Resolution of prior left popliteal to peroneal thrombus.    Echo. 9/29/17    1 - Concentric remodeling.     2 - Normal left ventricular systolic function (EF 60-65%).     3 - Normal right ventricular systolic function .     4 - Normal left ventricular diastolic function.     5 - No wall motion abnormalities.

## 2017-09-29 NOTE — ASSESSMENT & PLAN NOTE
68 yo M with PMHx HTN, HLD, PEs on rivaroxaban, and prostate cancer [place] [facility] [place]17 after sudden onset of headache, vertigo, and n/v approx 10-11 pm 09/26/17. CT head showing 2.6 cm L cerebellar hemorrhage.     Antiplatelets: none, ICH, hold anticoagulation 4 weeks  Statins: atorvastatin 40  SBP <160  DVT ppx: SCDs  PT/OT and speech recommending IP Rehab  Neuro checks q4h

## 2017-09-29 NOTE — ASSESSMENT & PLAN NOTE
NSGY/Vascualr Surgery consult  Q1hr Neuro, SBP <160  MRI w/w/o completed; stable  DDAVP for ASA hx  PCC for Xarelto Hx  Keppra bid ppx  PT/OT/SLP

## 2017-09-29 NOTE — SUBJECTIVE & OBJECTIVE
Interval History:  (09/29/2017): Patient is seen for follow-up rehab evaluation and recommendations.  No acute events over night.  Particpating with therapy.  Barriers for discharge/rehab admission: not medically ready for discharge.     HPI, Past Medical, Surgical, Family, and Social History remains the same as documented in the initial encounter.      Scheduled Medications:    amlodipine  10 mg Oral Daily    atorvastatin  40 mg Oral QHS    hydrALAZINE  75 mg Oral Q8H    levetiracetam  500 mg Oral BID    lisinopril  40 mg Oral Daily    polyethylene glycol  17 g Oral Daily    senna-docusate 8.6-50 mg  1 tablet Oral BID    sodium chloride 0.9%  3 mL Intravenous Q8H       PRN Medications: acetaminophen, hydrALAZINE, labetalol, magnesium oxide, magnesium oxide, ondansetron, potassium chloride 10%, potassium chloride 10%, potassium chloride 10%, potassium, sodium phosphates, potassium, sodium phosphates, potassium, sodium phosphates    Review of Systems   Constitutional: Negative for chills, fatigue and fever.   HENT: Negative for drooling, hearing loss, trouble swallowing and voice change.    Eyes: Negative for pain and visual disturbance.   Respiratory: Negative for cough, shortness of breath and wheezing.    Cardiovascular: Negative for chest pain and palpitations.   Gastrointestinal: Negative for abdominal pain, nausea and vomiting.   Genitourinary: Negative for difficulty urinating and flank pain.   Musculoskeletal: Positive for gait problem. Negative for arthralgias.   Skin: Negative for rash and wound.   Neurological: Positive for weakness. Negative for dizziness, numbness and headaches.   Psychiatric/Behavioral: Negative for agitation and hallucinations. The patient is not nervous/anxious.      Objective:     Vital Signs (Most Recent):  Temp: 99.1 °F (37.3 °C) (09/29/17 1100)  Pulse: 81 (09/29/17 1100)  Resp: (!) 22 (09/29/17 1100)  BP: 137/63 (09/29/17 1100)  SpO2: 95 % (09/29/17 1100)    Vital Signs  (24h Range):  Temp:  [97.6 °F (36.4 °C)-99.1 °F (37.3 °C)] 99.1 °F (37.3 °C)  Pulse:  [] 81  Resp:  [17-28] 22  SpO2:  [91 %-99 %] 95 %  BP: (134-177)/(63-80) 137/63     Physical Exam   Constitutional: He appears well-developed and well-nourished. No distress.   HENT:   Head: Normocephalic and atraumatic.   Eyes: Right eye exhibits no discharge. Left eye exhibits no discharge. No scleral icterus.   Neck: Normal range of motion.   Cardiovascular: Normal rate, regular rhythm and intact distal pulses.    Pulmonary/Chest: Effort normal. No respiratory distress. He has no wheezes.   Abdominal: Soft. He exhibits no distension. There is no tenderness.   Musculoskeletal: Normal range of motion. He exhibits no edema or tenderness.   Neurological:   -  Mental Status:  AAOx3.  Follows commands.  Answers correct age and .  Recent and remote memory intact.  -  Speech and language:  no aphasia or dysarthria.    -  Vision:  no hemianopsia or ptosis.    -  Facial movement (CN VII): symmetrical   -  Coordination:  Finger to nose exam:  RUE normal, LUE dysmetria.  -  Motor:  No pronator drift. RUE: 5/5.  LUE: 4/5.  -  Tone:  Normal.   -  Sensory:  Intact to light touch and pin prick.   Skin: Skin is warm and dry. No rash noted.   Psychiatric: He has a normal mood and affect. His behavior is normal. Thought content normal. Cognition and memory are impaired.   Vitals reviewed.

## 2017-09-29 NOTE — PROGRESS NOTES
[facility]  Neurocritical Care  Progress Note    Admit Date: 9/27/2017  Service Date: 09/29/2017  Length of Stay: 2    Subjective:     Chief Complaint: Nontraumatic intracerebral hemorrhage of cerebellum    History of Present Illness: 67-year-old male presents to the ER with his significant other for evaluation of headache, nausea, vomiting, diaphoresis.  Symptoms were acute onset last night around 10 or 11 PM.  He has never had a headache like this before in his life.  He admits to nausea with multiple episodes of emesis at home and in the waiting room.  The patient's significant other reports that throughout the evening he has not appeared himself.  He has been very lethargic and required assistance more than normal.  Patient denies any chest pain shortness of breath fever, chills    Hospital Course: 9/27 Admit NCC, s/p left cerebellar hemorrhage  9/28: KAYY, neuro exam stable, ready for step down   9/29: KAYY, patient waiting for room to open on step down unit    Interval History: [place], waiting for bed in step down unit.    Review of Systems   Constitutional: Negative.    HENT: Negative.    Eyes: Negative.    Respiratory: Negative.    Cardiovascular: Negative.    Gastrointestinal: Negative.    Endocrine: Negative.    Genitourinary: Negative.    Musculoskeletal: Positive for gait problem.   Skin: Negative.    Allergic/Immunologic: Negative.    Neurological: Positive for headaches.   Hematological: Negative.    Psychiatric/Behavioral: Negative.      Objective:     Vitals:  Temp: 99.1 °F (37.3 °C) (09/29/17 1100)  Pulse: 80 (09/29/17 1400)  Resp: 18 (09/29/17 1400)  BP: (!) 143/68 (09/29/17 1400)  SpO2: 95 % (09/29/17 1400)    Temp:  [97.6 °F (36.4 °C)-99.1 °F (37.3 °C)] 99.1 °F (37.3 °C)  Pulse:  [] 80  Resp:  [17-28] 18  SpO2:  [91 %-99 %] 95 %  BP: (134-177)/(63-80) 143/68         Oxygen Concentration (%):  [28] 28    09/28 0701 - 09/29 0700  In: 1805 [I.V.:1805]  Out: 2800  [Urine:2800]    Physical Exam   Constitutional: He is oriented to person, place, and time. He appears well-developed and well-nourished.   HENT:   Head: Normocephalic and atraumatic.   Eyes: EOM are normal. Pupils are equal, round, and reactive to light.   Cardiovascular: Normal rate and regular rhythm.    Pulmonary/Chest: Effort normal.   Musculoskeletal: Normal range of motion.   Neurological: He is alert and oriented to person, place, and time. GCS eye subscore is 4. GCS verbal subscore is 5. GCS motor subscore is 6.   Awake, alert, fully oriented  Following commands  Moving all extremities   LUE dysmetria  Ataxic gait, leans to the right   Skin: Skin is warm and dry.       Medications:  Continuous  sodium chloride 0.9% Last Rate: 75 mL/hr at 09/29/17 1400   Scheduled  amlodipine 10 mg Daily   atorvastatin 40 mg QHS   hydrALAZINE 75 mg Q8H   levetiracetam 500 mg BID   lisinopril 40 mg Daily   polyethylene glycol 17 g Daily   senna-docusate 8.6-50 mg 1 tablet BID   sodium chloride 0.9% 3 mL Q8H   PRN  acetaminophen 650 mg Q4H PRN   hydrALAZINE 10 mg Q4H PRN   labetalol 10 mg Q4H PRN   magnesium oxide 800 mg PRN   magnesium oxide 800 mg PRN   ondansetron 4 mg Q12H PRN   potassium chloride 10% 40 mEq PRN   potassium chloride 10% 40 mEq PRN   potassium chloride 10% 60 mEq PRN   potassium, sodium phosphates 2 packet PRN   potassium, sodium phosphates 2 packet PRN   potassium, sodium phosphates 2 packet PRN     Today I personally reviewed pertinent medications, lines/drains/airways, imaging, cardiology, lab results, microbiology results, notably:      Assessment/Plan:     Neuro   * Nontraumatic intracerebral hemorrhage of cerebellum    NSGY/Vascualr Surgery consult  Q1hr Neuro, SBP <160  MRI w/w/o completed; stable  DDAVP for ASA hx  PCC for Xarelto Hx  Keppra bid ppx  PT/OT/SLP        Cardiac/Vascular   High cholesterol    Continue atorvastatin 40 qhs         HTN (hypertension)    SBP  <140  Cardene/labetolol/hydralazine PRN  EKG/Echo completed  Resume Home meds        Hematology   Acute deep vein thrombosis (DVT) of proximal vein of left lower extremity    Xarelto held with ICH  Repeat US negative        History of blood clots    Holding home xarelto, resume as outpatient  Ultrasound negative on admission            Prophylaxis:  Venous Thromboembolism: mechanical  Stress Ulcer: None  Ventilator Pneumonia: no     Activity Orders          None        Full Code    Boo Beltran MD  Neurocritical Care  [facility]

## 2017-09-29 NOTE — PLAN OF CARE
SW met with pt regarding rehab services. Pt advised that if he could not get into [facility] he had no other preferences.      Pt referred to [facility] via [facility].    Danielle Simon LMSW  Neurocritical Care   [facility]  [id number]

## 2017-09-29 NOTE — SUBJECTIVE & OBJECTIVE
Interval History: no acute events overnight. No complaints this morning, dressings removed.     Medications:  Continuous Infusions:   sodium chloride 0.9% 75 mL/hr at 09/29/17 1600     Scheduled Meds:   amlodipine  10 mg Oral Daily    atorvastatin  40 mg Oral QHS    hydrALAZINE  75 mg Oral Q8H    levetiracetam  500 mg Oral BID    lisinopril  40 mg Oral Daily    polyethylene glycol  17 g Oral Daily    senna-docusate 8.6-50 mg  1 tablet Oral BID    sodium chloride 0.9%  3 mL Intravenous Q8H     PRN Meds:acetaminophen, hydrALAZINE, labetalol, magnesium oxide, magnesium oxide, ondansetron, potassium chloride 10%, potassium chloride 10%, potassium chloride 10%, potassium, sodium phosphates, potassium, sodium phosphates, potassium, sodium phosphates     Review of Systems  Objective:     Weight: 82.1 kg (181 lb)  Body mass index is 28.35 kg/m².  Vital Signs (Most Recent):  Temp: 98.2 °F (36.8 °C) (09/29/17 1748)  Pulse: 91 (09/29/17 1748)  Resp: 18 (09/29/17 1748)  BP: 137/63 (09/29/17 1748)  SpO2: 95 % (09/29/17 1748) Vital Signs (24h Range):  Temp:  [97.6 °F (36.4 °C)-99.2 °F (37.3 °C)] 98.2 °F (36.8 °C)  Pulse:  [] 91  Resp:  [18-28] 18  SpO2:  [91 %-99 %] 95 %  BP: (134-168)/(63-77) 137/63       Date 09/29/17 0700 - 09/30/17 0659   Shift 2356-8067 8888-4507 1648-2065 24 Hour Total   I  N  T  A  K  E   P.O. 450   450    I.V.  (mL/kg) 593.8  (7.2) 150  (1.8)  743.8  (9.1)    Shift Total  (mL/kg) 1043.8  (12.7) 150  (1.8)  1193.8  (14.5)   O  U  T  P  U  T   Urine  (mL/kg/hr) 775  (1.2)   775    Shift Total  (mL/kg) 775  (9.4)   775  (9.4)   Weight (kg) 82.1 82.1 82.1 82.1              Oxygen Concentration (%):  [28] 28         Neurosurgery Physical Exam  Alert and oriented to person, place and time  Eyes open spontaneously  Responds appropriately to questions  Follows commands   PERRL, EOMI  Face symmetric  Tongue midline  Moves all extremities with good strength  Sensation intact to light touch in all  extremities  Slight dysmetria in LUE, improved from yesterday    Significant Labs:    Recent Labs  Lab 09/28/17  0208 09/28/17  1214 09/28/17  1814 09/29/17  0132   GLU 95  --   --  91     --   --  137   K 3.3* 4.2  --  4.6     --   --  107   CO2 29  --   --  21*   BUN 7*  --   --  6*   CREATININE 0.8  --   --  0.8   CALCIUM 8.8  --   --  9.0   MG 1.8  --  2.1 2.3       Recent Labs  Lab 09/28/17  0208 09/29/17 0132   WBC 5.35 5.63   HGB 12.4* 15.0   HCT 39.3* 46.3    152       Recent Labs  Lab 09/28/17 0208   INR 1.0   APTT 24.0     All pertinent labs from the last 24 hours have been reviewed.    Significant Diagnostics:  CT: No results found in the last 24 hours.  MRI: Mri Brain W Wo Contrast    Result Date: 9/28/2017  1.  Acute parenchymal hemorrhage involving the paramedian superior left cerebellum with adjacent quan-hemorrhage edema and localized mass effect on the fourth ventricle without evidence of hydrocephalus.  Allowing for intrinsic T1 hyperintensity, no definite abnormal enhancement is identified within this region.  Further evaluation with repeat contrast-enhanced MRI examination can be performed following resolution of acute hemorrhage as clinically warranted. 2.  Findings suggestive of significant chronic microvascular ischemic change, remote infarction of the paramedian left occipital lobe as well as multiple remote lacunar type infarcts in the basal ganglia, thalami and sebastian.  Few small foci of increased diffusivity within the corona radiata without corresponding hypointense signal on ADC maps likely reflecting sequela of prior infarction. Electronically signed by: MAX BRANNON Date:     09/28/17 Time:    23:53

## 2017-09-29 NOTE — PROGRESS NOTES
[facility]  Neurosurgery  Progress Note    Subjective:     History of Present Illness: 67M with PMH of DVTs (on Xarelto), TIA (on ASA 81), HTN, HLD who presented to the ED for HA with nausea and vomiting. On presentation he demonstrated dysmetria L>RUEs, but was otherwise intact, AOx3, GCS15. CTH shows cerebellar ICH with ICH score of 1. He will be admitted to the [facility] for further management with NSGY following.    Post-Op Info:  * No surgery found *         Interval History: no acute events overnight. No complaints this morning, dressings removed.     Medications:  Continuous Infusions:   sodium chloride 0.9% 75 mL/hr at 09/29/17 1600     Scheduled Meds:   amlodipine  10 mg Oral Daily    atorvastatin  40 mg Oral QHS    hydrALAZINE  75 mg Oral Q8H    levetiracetam  500 mg Oral BID    lisinopril  40 mg Oral Daily    polyethylene glycol  17 g Oral Daily    senna-docusate 8.6-50 mg  1 tablet Oral BID    sodium chloride 0.9%  3 mL Intravenous Q8H     PRN Meds:acetaminophen, hydrALAZINE, labetalol, magnesium oxide, magnesium oxide, ondansetron, potassium chloride 10%, potassium chloride 10%, potassium chloride 10%, potassium, sodium phosphates, potassium, sodium phosphates, potassium, sodium phosphates     Review of Systems  Objective:     Weight: 82.1 kg (181 lb)  Body mass index is 28.35 kg/m².  Vital Signs (Most Recent):  Temp: 98.2 °F (36.8 °C) (09/29/17 1748)  Pulse: 91 (09/29/17 1748)  Resp: 18 (09/29/17 1748)  BP: 137/63 (09/29/17 1748)  SpO2: 95 % (09/29/17 1748) Vital Signs (24h Range):  Temp:  [97.6 °F (36.4 °C)-99.2 °F (37.3 °C)] 98.2 °F (36.8 °C)  Pulse:  [] 91  Resp:  [18-28] 18  SpO2:  [91 %-99 %] 95 %  BP: (134-168)/(63-77) 137/63       Date 09/29/17 0700 - 09/30/17 0659   Shift 0029-8732 [id number] 9693-0795 24 Hour Total   I  N  T  A  K  E   P.O. 450   450    I.V.  (mL/kg) 593.8  (7.2) 150  (1.8)  743.8  (9.1)    Shift Total  (mL/kg) 1043.8  (12.7) 150  (1.8)   1193.8  (14.5)   O  U  T  P  U  T   Urine  (mL/kg/hr) 775  (1.2)   775    Shift Total  (mL/kg) 775  (9.4)   775  (9.4)   Weight (kg) 82.1 82.1 82.1 82.1              Oxygen Concentration (%):  [28] 28         Neurosurgery Physical Exam  Alert and oriented to person, place and time  Eyes open spontaneously  Responds appropriately to questions  Follows commands   PERRL, EOMI  Face symmetric  Tongue midline  Moves all extremities with good strength  Sensation intact to light touch in all extremities  Slight dysmetria in LUE, improved from yesterday    Significant Labs:    Recent Labs  Lab 09/28/17  0208 09/28/17  1214 09/28/17  1814 09/29/17  0132   GLU 95  --   --  91     --   --  137   K 3.3* 4.2  --  4.6     --   --  107   CO2 29  --   --  21*   BUN 7*  --   --  6*   CREATININE 0.8  --   --  0.8   CALCIUM 8.8  --   --  9.0   MG 1.8  --  2.1 2.3       Recent Labs  Lab 09/28/17  0208 09/29/17  0132   WBC 5.35 5.63   HGB 12.4* 15.0   HCT 39.3* 46.3    152       Recent Labs  Lab 09/28/17 0208   INR 1.0   APTT 24.0     All pertinent labs from the last 24 hours have been reviewed.    Significant Diagnostics:  CT: No results found in the last 24 hours.  MRI: Mri Brain W Wo Contrast    Result Date: 9/28/2017  1.  Acute parenchymal hemorrhage involving the paramedian superior left cerebellum with adjacent quan-hemorrhage edema and localized mass effect on the fourth ventricle without evidence of hydrocephalus.  Allowing for intrinsic T1 hyperintensity, no definite abnormal enhancement is identified within this region.  Further evaluation with repeat contrast-enhanced MRI examination can be performed following resolution of acute hemorrhage as clinically warranted. 2.  Findings suggestive of significant chronic microvascular ischemic change, remote infarction of the paramedian left occipital lobe as well as multiple remote lacunar type infarcts in the basal ganglia, thalami and sebastian.  Few small foci of  increased diffusivity within the corona radiata without corresponding hypointense signal on ADC maps likely reflecting sequela of prior infarction. Electronically signed by: MAX BRANNON Date:     09/28/17 Time:    23:53     Assessment/Plan:     * Nontraumatic intracerebral hemorrhage of cerebellum    67M on xarelto for DVT with PMH HTN, prostate CA, HLD, TIAs (on ASA) who presents with cerebellar ICH (ICH score 1).    Neuro:  --continue medical management per primary team   --Q4h neuro checks  --Repeat head CT scan is stable   --HOB >30  --Call NSGY with any acute changes in neuro exam    CVS:  --SBP goals <160 on cardene (hydralazine, labetalol, amlodipine PRN)    Pulm:  --Aggressive pulmonary management  --Insentive spirometry & Duonebs PRN    Heme/onc/ID:  --CBC/CMP/Coags daily  --PCC to reverse Xarelto    F/E/GI  --Na 140-150  --Diet per swallow eval  --PPI  --Laxatives for bowel care    Ppx:  -DVT ppx per NCC  -PPI    Dispo:  -PT/OT/OOB  -Speech eval  -Consult social work for dispo  - follow up in neurosurgery clinic in 1-2 months with MRI brain w/wo            Cayetano Barrientos D.O.  Neurological Surgery  [facility] [place]

## 2017-09-29 NOTE — PT/OT/SLP PROGRESS
"Speech Language Pathology  Treatment    Otis Salcido   MRN: [id number]   [id number]    Admitting Diagnosis: Nontraumatic intracerebral hemorrhage of cerebellum    Diet recommendations: Solid Diet Level: Regular  Liquid Diet Level: Thin     SLP Treatment Date: 10/26/17  Speech Start Time: 1248     Speech Stop Time: 1306     Speech Total (min): 18 min       TREATMENT BILLABLE MINUTES:  Speech Therapy Individual 18    Has the patient been evaluated by SLP for swallowing? : Yes  Keep patient NPO?: No   General Precautions: Standard, fall  Current Respiratory Status:  (room air)       Subjective:  "Blood clot in my brain." Pt aware of his medical situation.     Pain/Comfort  Pain Rating 1: 0/10  Pain Rating Post-Intervention 1: 0/10    Objective:   Pt awake and alert upon entry, sitting upright in b/s chair. Pt completed functional math problems involving money with 50% ind'ly and 100% cues. During compare/ contrast task to ID similarity and different between 2 common objects, pt was 67% accurate ind'ly and 100% accurate given cues. Pt observed to req extended response time throughout SLP session. Reading and visual spatial tasks not attempted this date, as pt remains without his glasses at b/s. Pt educated re: prognosis for improvement and SLP POC. White board updated. No further questions.     Assessment:  Otis Salcido is a 67 y.o. male with a medical diagnosis of Nontraumatic intracerebral hemorrhage of cerebellum and presents with cognitive-linguistic deficits.     Discharge recommendations: Discharge Facility/Level Of Care Needs: rehabilitation facility     Goals:    SLP Goals        Problem: SLP Goal    Goal Priority Disciplines Outcome   SLP Goal     SLP Ongoing (interventions implemented as appropriate)   Description:  Speech Language Pathology Goals  Goals expected to be met by 10/4  1.  Assess functional reading and writing skills  2.  Respond to categorization tasks with 80% accuracy  4.  REspond to mental " manipulation tasks 75% accuracy.    5.  Respond to problem solving tasks with 80% accuracy                          Plan:   Patient to be seen Therapy Frequency: 5 x/week   Plan of Care expires: 10/26/17  Plan of Care reviewed with: patient  SLP Follow-up?: Yes  SLP - Next Visit Date: 09/28/17         KRISTINE Garner, CCC-SLP  [phone]  9/29/2017

## 2017-09-29 NOTE — NURSING TRANSFER
Nursing Transfer Note      9/29/2017     Transfer To: 701    Transfer via wheelchair    Transfer with cardiac monitoring    Transported by RN    Medicines sent: N/A    Chart send with patient: Yes    Notified: Family    Upon arrival to floor: cardiac monitor applied, patient oriented to room, call bell in reach and bed in lowest position    Report given to KRISTAL Velázquez

## 2017-09-29 NOTE — PT/OT/SLP PROGRESS
"Occupational Therapy  Treatment    Otis Salcido   MRN: [id number]   Admitting Diagnosis: <principal problem not specified>    OT Date of Treatment: 09/29/17   OT Start Time: 1020  OT Stop Time: 1047  OT Total Time (min): 27 min    Billable Minutes:  Self Care/Home Management 17 and Therapeutic Activity 10    General Precautions: Standard, aspiration, fall  Orthopedic Precautions: N/A  Braces: N/A    Do you have any cultural, spiritual, Church conflicts, given your current situation?: Nondenominational    Subjective:  Communicated with nurse prior to session.  Patient:  "I got more balance today than yesterday."  Pain/Comfort  Pain Rating 1: 4/10  Location 1:  (headache)  Pain Addressed 1: Nurse notified, Pre-medicate for activity (nurse had administed medication prior and pain reduced from 8 to 4 prior to session)  Pain Rating Post-Intervention 1: 4/10    Objective:  Patient found with: peripheral IV, telemetry, pulse ox (continuous), SCD, bed alarm, blood pressure cuff   Brother present.    Functional Mobility:  Bed Mobility:  Rolling/Turning to Left: Minimum assistance  Rolling/Turning Right: Minimum assistance  Supine to Sit: Moderate Assistance (from the right side)    Transfers:   Sit <> Stand Assistance: Moderate Assistance  Sit <> Stand Assistive Device: No Assistive Device  Bed <> Chair Technique: Stand Pivot  Bed <> Chair Transfer Assistance: Moderate Assistance    Activities of Daily Living:   Grooming Position: Standing  Grooming Level of Assistance: Moderate assistance      Additional Treatment:   Patient education provided on role of OT and need for rehab upon discharge.  Patient verbalizing understanding via teach back method. Patient and family instructed on need to call for assistance for toileting needs and when getting up.  Continued education, patient/ family training recommended.  Patient alert and oriented x 3; able to follow 4/4 one step commands.  SBA with static sitting balance EOB; mod assist with " "dynamic standing balance.    Patient's functional status and disposition recommendation discussed with nsg, patient and MD.  White board updated in patient's room.  OT asked if there were any other questions; patient/ family had no further questions.      AM-PAC 6 CLICK ADL   How much help from another person does this patient currently need?   1 = Unable, Total/Dependent Assistance  2 = A lot, Maximum/Moderate Assistance  3 = A little, Minimum/Contact Guard/Supervision  4 = None, Modified [place]/Independent    Putting on and taking off regular lower body clothing? : 2  Bathing (including washing, rinsing, drying)?: 2  Toileting, which includes using toilet, bedpan, or urinal? : 2  Putting on and taking off regular upper body clothing?: 2  Taking care of personal grooming such as brushing teeth?: 2  Eating meals?: 3  Total Score: 13     AM-PAC Raw Score CMS "G-Code Modifier Level of Impairment Assistance   6 % Total / Unable   7 - 8 CM 80 - 100% Maximal Assist   9-13 CL 60 - 80% Moderate Assist   14 - 19 CK 40 - 60% Moderate Assist   20 - 22 CJ 20 - 40% Minimal Assist   23 CI 1-20% SBA / CGA   24 CH 0% Independent/ Mod I       Patient left up in chair with all lines intact and call button in reach    Assessment:  Oits Salcido is a 67 y.o. male with a medical diagnosis of ICH and presents with performance deficits of physical skills including impaired balance, mobility, dexterity, fine motor coordination, gross motor coordination, and endurance; demonstrating performance deficits of cognitive skills including impaired  attention, problem solving, sequencing and memory all resulting in inability organizing occupational performance in a timely and safe manner; demonstrating performance deficits of psychosocial skills including impairments of interpersonal interactions and coping strategies which are skills necessary to successfully and appropriately participate in everyday tasks and social situations. "  These performance deficits have resulted in activity limitations including but not limited to:   bed mobility, transfers, ascending/ descending stairs, walking short and long distances, walking around obstacles, transitional movement patterns (kneeling, bending); eating, upper body dressing, lower body dressing, brushing teeth, toileting, bathing, carrying objects, typing, writing, reading, balancing checkbook, shopping, meal preparation, fishing and hunting.   Patient's role as father, grandfather, and independent caretaker for self has been affected. Patient will benefit from skilled OT services to maximize level of independence with self-care skills and functional mobility.  Will benefit from Rehab.     Rehab identified problem list/impairments: Rehab identified problem list/impairments: weakness, impaired endurance, impaired self care skills, impaired sensation, impaired functional mobilty, gait instability, impaired balance, impaired cognition, decreased coordination, decreased lower extremity function, decreased upper extremity function, decreased safety awareness, abnormal tone, decreased ROM, impaired fine motor, impaired coordination    Rehab potential is good.    Activity tolerance: Good    Discharge recommendations: Discharge Facility/Level Of Care Needs: rehabilitation facility     Barriers to discharge: Barriers to Discharge: Inaccessible home environment, Decreased caregiver support    Equipment recommendations: 3-in-1 commode, bath bench     GOALS:    Occupational Therapy Goals        Problem: Occupational Therapy Goal    Goal Priority Disciplines Outcome Interventions   Occupational Therapy Goal     OT, PT/OT     Description:  Goals set 9/27 to be addressed for 7 days with expiration date, 10/4:  Patient will increase functional independence with ADLs by performing:    Patient will demonstrate rolling to the right with SBA assist.  Not met   Patient will demonstrate rolling to the left with SBA  assist.   Not met  Patient will demonstrate supine -sit with SBA  assist.   Not met  Patient will demonstrate stand pivot transfers with min assist.   Not met  Patient will demonstrate grooming while standing with min assist.   Not met  Patient will demonstrate upper body dressing with SBA assist while seated EOB.   Not met  Patient will demonstrate lower body dressing with min assist while seated EOB.   Not met  Patient will demonstrate toileting with min assist.   Not met  Patient's family / caregiver will demonstrate independence and safety with assisting patient with self-care skills and functional mobility.     Not met  Patient and/or patient's family will verbalize understanding of stroke prevention guidelines, personal risk factors and stroke warning signs via teachback method.  Not met                           Plan:  Patient to be seen 6 x/week to address the above listed problems via self-care/home management, therapeutic activities, cognitive retraining, neuromuscular re-education, therapeutic exercises, sensory integration  Plan of Care expires: 10/26/17  Plan of Care reviewed with: patient, sibling         Val VazquezJASMINA  09/29/2017

## 2017-09-29 NOTE — PT/OT/SLP PROGRESS
Physical Therapy  Treatment    Otis Slacido   MRN: [id number]   Admitting Diagnosis: Nontraumatic intracerebral hemorrhage of cerebellum    PT Received On: 09/29/17  PT Start Time: 1433     PT Stop Time: 1456    PT Total Time (min): 23 min       Billable Minutes:  Gait Training 15 and Therapeutic Activity 8    Treatment Type: Treatment  PT/PTA: PTA     PTA Visit Number: 1       General Precautions: Standard, aspiration, fall  Orthopedic Precautions: N/A   Braces: N/A    Do you have any cultural, spiritual, Yazidism conflicts, given your current situation?: none noted    Subjective:  Communicated with RN (Roe) prior to session.  Pt agreeable to PT session    Pain/Comfort  Pain Rating 1: 0/10  Pain Rating Post-Intervention 1: 0/10    Objective:   Patient found with: blood pressure cuff, pulse ox (continuous), telemetry, peripheral IV (RN disconnects from IV.  Pt found UIC with daughter-in-law present)        FUNCTIONAL MOBILITY    Bed Mobility        NP 2* pt found/left seated UIC      Transfers  (vc's for hand placement and safety of task)       Sit > stand from BS chair with no AD requiring mod/min A for balance       Stand > sit to BS chair requiring min A for controlled descent    Gait        Distance: 18ft x2 trials       Assistive Device: R HHA       Assistance Required: mod/min A to facilitate trunk ext, hip extension and balance       Verbal Cues required: for postural control, appropriate weight shift, sequencing,                                        R knee control in stance phase, B step length, gina and safety       Gait Deviations: Pt demonstrates increased gina,                              Decreased B step length, decreased stride length, narrow base of support,                              decreased toe-to-floor clearance, decreased weight-shifting ability,                             foot flat and R lateral lean      THERAPEUTIC ACTIVITIES     STANDING (1-2 min)       Pt tolerates standing  with R HHA requiring min A for balance with vc's.  Pt demonstrates R lateral lean    Education:  Education provided to pt regarding: weight shift, step length and safety.    Whiteboard updated with correct mobility information. RN/PCT notified.  Pt apropriate to amb with therapy ONLY at this time.   Pt safe to transfer with RN/PCT: Use no AD & 1 person assist       AM-PAC 6 CLICK MOBILITY  How much help from another person does this patient currently need?   1 = Unable, Total/Dependent Assistance  2 = A lot, Maximum/Moderate Assistance  3 = A little, Minimum/Contact Guard/Supervision  4 = None, Modified [place]/Independent    Turning over in bed (including adjusting bedclothes, sheets and blankets)?: 3  Sitting down on and standing up from a chair with arms (e.g., wheelchair, bedside commode, etc.): 2  Moving from lying on back to sitting on the side of the bed?: 2  Moving to and from a bed to a chair (including a wheelchair)?: 2  Need to walk in hospital room?: 2  Climbing 3-5 steps with a railing?: 1  Total Score: 12    AM-PAC Raw Score CMS G-Code Modifier Level of Impairment Assistance   6 % Total / Unable   7 - 9 CM 80 - 100% Maximal Assist   10 - 14 CL 60 - 80% Moderate Assist   15 - 19 CK 40 - 60% Moderate Assist   20 - 22 CJ 20 - 40% Minimal Assist   23 CI 1-20% SBA / CGA   24 CH 0% Independent/ Mod I     Patient left up in chair with all lines intact, call button in reach, RN notified and daughter-in-law present.    Assessment:  Otis Salcido is a 67 y.o. male with a medical diagnosis of Nontraumatic intracerebral hemorrhage of cerebellum and presents with R hemiparesis, impaired balance, cognitive deficits requiring significant assistance and verbal cues for transfers, gait.   In light of pt's current functional level and deficits, it is anticipated that pt will need to participate in an intense rehab program consisting of PT, OT and ST in order to achieve full rehab potential to return to  previous level of function and roles.  Pt will cont to benefit from skilled PT intervention to address deficits and improve functional mobility. .    Rehab identified problem list/impairments: Rehab identified problem list/impairments: weakness, impaired endurance, impaired sensation, impaired self care skills, impaired functional mobilty, gait instability, impaired balance, decreased coordination, decreased lower extremity function, decreased safety awareness, impaired coordination, impaired fine motor    Rehab potential is good.    Activity tolerance: Good    Discharge recommendations: Discharge Facility/Level Of Care Needs: rehabilitation facility     Barriers to discharge: Barriers to Discharge: Decreased caregiver support    Equipment recommendations: Equipment Needed After Discharge:  (TBD)     GOALS:    Physical Therapy Goals        Problem: Physical Therapy Goal    Goal Priority Disciplines Outcome Goal Variances Interventions   Physical Therapy Goal     PT/OT, PT Ongoing (interventions implemented as appropriate)     Description:  PT goals until 10/6/17     1. Pt supine to sit with CGA-not met  2. Pt sit to supine with CGA-not met  3. Pt sit to stand with or without RW as needed for safety with CGA-not met  4. Pt to perform gait 100ft with or without RW as needed for safety with CGA.-not met  5. Pt to transfer bed to/from bedside chair with CGA.-not met  6. Pt to perform B LE exs in sitting x 20 reps with handout.-not met                      PLAN:    Patient to be seen 5 x/week  to address the above listed problems via gait training, therapeutic activities, therapeutic exercises, neuromuscular re-education  Plan of Care expires: 10/27/17  Plan of Care reviewed with: patient, other (see comments) (daugnter-in-law)         Meera Campbell, PTA  09/29/2017

## 2017-09-30 PROBLEM — Z86.711 HISTORY OF PULMONARY EMBOLISM: Status: ACTIVE | Noted: 2017-09-30

## 2017-09-30 LAB
ALBUMIN SERPL BCP-MCNC: 3.3 G/DL
ALP SERPL-CCNC: 96 U/L
ALT SERPL W/O P-5'-P-CCNC: 16 U/L
ANION GAP SERPL CALC-SCNC: 9 MMOL/L
AST SERPL-CCNC: 19 U/L
BASOPHILS # BLD AUTO: 0.03 K/UL
BASOPHILS NFR BLD: 0.6 %
BILIRUB SERPL-MCNC: 0.8 MG/DL
BUN SERPL-MCNC: 6 MG/DL
CALCIUM SERPL-MCNC: 9.4 MG/DL
CHLORIDE SERPL-SCNC: 106 MMOL/L
CO2 SERPL-SCNC: 24 MMOL/L
CREAT SERPL-MCNC: 0.8 MG/DL
DIFFERENTIAL METHOD: NORMAL
EOSINOPHIL # BLD AUTO: 0 K/UL
EOSINOPHIL NFR BLD: 0.6 %
ERYTHROCYTE [DISTWIDTH] IN BLOOD BY AUTOMATED COUNT: 13.7 %
EST. GFR  (AFRICAN AMERICAN): >60 ML/MIN/1.73 M^2
EST. GFR  (NON AFRICAN AMERICAN): >60 ML/MIN/1.73 M^2
GLUCOSE SERPL-MCNC: 99 MG/DL
HCT VFR BLD AUTO: 45.6 %
HGB BLD-MCNC: 14.8 G/DL
LYMPHOCYTES # BLD AUTO: 1.4 K/UL
LYMPHOCYTES NFR BLD: 26.6 %
MAGNESIUM SERPL-MCNC: 2.1 MG/DL
MCH RBC QN AUTO: 28.8 PG
MCHC RBC AUTO-ENTMCNC: 32.5 G/DL
MCV RBC AUTO: 89 FL
MONOCYTES # BLD AUTO: 0.6 K/UL
MONOCYTES NFR BLD: 10.5 %
NEUTROPHILS # BLD AUTO: 3.3 K/UL
NEUTROPHILS NFR BLD: 61.3 %
PHOSPHATE SERPL-MCNC: 3.1 MG/DL
PLATELET # BLD AUTO: 206 K/UL
PMV BLD AUTO: 12.2 FL
POCT GLUCOSE: 83 MG/DL (ref 70–110)
POCT GLUCOSE: 88 MG/DL (ref 70–110)
POCT GLUCOSE: 97 MG/DL (ref 70–110)
POTASSIUM SERPL-SCNC: 3.6 MMOL/L
PROT SERPL-MCNC: 7.8 G/DL
RBC # BLD AUTO: 5.14 M/UL
SODIUM SERPL-SCNC: 139 MMOL/L
WBC # BLD AUTO: 5.34 K/UL

## 2017-09-30 PROCEDURE — 80053 COMPREHEN METABOLIC PANEL: CPT

## 2017-09-30 PROCEDURE — 97116 GAIT TRAINING THERAPY: CPT

## 2017-09-30 PROCEDURE — 99233 SBSQ HOSP IP/OBS HIGH 50: CPT | Mod: GC,,, | Performed by: PSYCHIATRY & NEUROLOGY

## 2017-09-30 PROCEDURE — 97535 SELF CARE MNGMENT TRAINING: CPT

## 2017-09-30 PROCEDURE — 25000003 PHARM REV CODE 250: Performed by: STUDENT IN AN ORGANIZED HEALTH CARE EDUCATION/TRAINING PROGRAM

## 2017-09-30 PROCEDURE — 25000003 PHARM REV CODE 250: Performed by: NURSE PRACTITIONER

## 2017-09-30 PROCEDURE — 97530 THERAPEUTIC ACTIVITIES: CPT

## 2017-09-30 PROCEDURE — 83735 ASSAY OF MAGNESIUM: CPT

## 2017-09-30 PROCEDURE — A4216 STERILE WATER/SALINE, 10 ML: HCPCS | Performed by: NURSE PRACTITIONER

## 2017-09-30 PROCEDURE — 85025 COMPLETE CBC W/AUTO DIFF WBC: CPT

## 2017-09-30 PROCEDURE — 84100 ASSAY OF PHOSPHORUS: CPT

## 2017-09-30 PROCEDURE — 20600001 HC STEP DOWN PRIVATE ROOM

## 2017-09-30 PROCEDURE — 36415 COLL VENOUS BLD VENIPUNCTURE: CPT

## 2017-09-30 PROCEDURE — 25000003 PHARM REV CODE 250: Performed by: PHYSICIAN ASSISTANT

## 2017-09-30 PROCEDURE — 97112 NEUROMUSCULAR REEDUCATION: CPT

## 2017-09-30 RX ADMIN — AMLODIPINE BESYLATE 10 MG: 10 TABLET ORAL at 08:09

## 2017-09-30 RX ADMIN — Medication 3 ML: at 02:09

## 2017-09-30 RX ADMIN — LISINOPRIL 40 MG: 20 TABLET ORAL at 08:09

## 2017-09-30 RX ADMIN — ACETAMINOPHEN 650 MG: 325 TABLET ORAL at 06:09

## 2017-09-30 RX ADMIN — Medication 3 ML: at 10:09

## 2017-09-30 RX ADMIN — HYDRALAZINE HYDROCHLORIDE 75 MG: 50 TABLET ORAL at 05:09

## 2017-09-30 RX ADMIN — ATORVASTATIN CALCIUM 40 MG: 20 TABLET, FILM COATED ORAL at 10:09

## 2017-09-30 RX ADMIN — STANDARDIZED SENNA CONCENTRATE AND DOCUSATE SODIUM 1 TABLET: 8.6; 5 TABLET, FILM COATED ORAL at 08:09

## 2017-09-30 RX ADMIN — LEVETIRACETAM 500 MG: 500 TABLET ORAL at 08:09

## 2017-09-30 RX ADMIN — Medication 10 ML: at 05:09

## 2017-09-30 RX ADMIN — SODIUM CHLORIDE: 0.9 INJECTION, SOLUTION INTRAVENOUS at 08:09

## 2017-09-30 RX ADMIN — HYDRALAZINE HYDROCHLORIDE 75 MG: 50 TABLET ORAL at 10:09

## 2017-09-30 RX ADMIN — LEVETIRACETAM 500 MG: 500 TABLET ORAL at 10:09

## 2017-09-30 RX ADMIN — HYDRALAZINE HYDROCHLORIDE 75 MG: 50 TABLET ORAL at 02:09

## 2017-09-30 RX ADMIN — POLYETHYLENE GLYCOL 3350 17 G: 17 POWDER, FOR SOLUTION ORAL at 08:09

## 2017-09-30 NOTE — SUBJECTIVE & OBJECTIVE
Medications:  Continuous Infusions:   sodium chloride 0.9% 75 mL/hr at 09/29/17 1600     Scheduled Meds:   amlodipine  10 mg Oral Daily    atorvastatin  40 mg Oral QHS    hydrALAZINE  75 mg Oral Q8H    levetiracetam  500 mg Oral BID    lisinopril  40 mg Oral Daily    polyethylene glycol  17 g Oral Daily    senna-docusate 8.6-50 mg  1 tablet Oral BID    sodium chloride 0.9%  3 mL Intravenous Q8H     PRN Meds:acetaminophen, hydrALAZINE, labetalol, magnesium oxide, magnesium oxide, ondansetron, potassium chloride 10%, potassium chloride 10%, potassium chloride 10%, potassium, sodium phosphates, potassium, sodium phosphates, potassium, sodium phosphates     Review of Systems   Constitutional: Negative for chills and fever.   All other systems reviewed and are negative.    Objective:     Weight: 82.1 kg (181 lb)  Body mass index is 28.35 kg/m².  Vital Signs (Most Recent):  Temp: 99 °F (37.2 °C) (09/30/17 0756)  Pulse: 89 (09/30/17 0756)  Resp: 18 (09/30/17 0756)  BP: (!) 130/59 (09/30/17 0756)  SpO2: (!) 93 % (09/30/17 0756) Vital Signs (24h Range):  Temp:  [97.3 °F (36.3 °C)-99.2 °F (37.3 °C)] 99 °F (37.2 °C)  Pulse:  [] 89  Resp:  [18-24] 18  SpO2:  [92 %-97 %] 93 %  BP: (128-157)/(58-76) 130/59                           Physical Exam:    Constitutional: He appears well-developed and well-nourished. He is not diaphoretic. No distress.     Eyes: Pupils are equal, round, and reactive to light. Right eye exhibits no discharge. Left eye exhibits no discharge.     Cardiovascular: Normal rate.     Psych/Behavior: He is alert. He is oriented to person, place, and time. He has a normal mood and affect.     Musculoskeletal:        Right Upper Extremities: Range of motion is full. Muscle strength is 5/5. Tone is normal.        Left Upper Extremities: Range of motion is full. Muscle strength is 5/5. Tone is normal.       Right Lower Extremities: Range of motion is full. There is no tenderness. Muscle strength  is 5/5. Tone is normal.        Left Lower Extremities: Range of motion is full. There is no tenderness. Muscle strength is 5/5. Tone is normal.     Neurological:        Coordination:   LUE pronator drift       Cranial nerves: Cranial nerve(s) II, III, IV, V, VI, VII, VIII, IX, X, XI and XII are intact.       Significant Labs:    Recent Labs  Lab 09/28/17  1214 09/28/17  1814 09/29/17 0132 09/30/17  0352   GLU  --   --  91 99   NA  --   --  137 139   K 4.2  --  4.6 3.6   CL  --   --  107 106   CO2  --   --  21* 24   BUN  --   --  6* 6*   CREATININE  --   --  0.8 0.8   CALCIUM  --   --  9.0 9.4   MG  --  2.1 2.3 2.1       Recent Labs  Lab 09/29/17 0132 09/30/17  0352   WBC 5.63 5.34   HGB 15.0 14.8   HCT 46.3 45.6    206     No results for input(s): LABPT, INR, APTT in the last 48 hours.  Microbiology Results (last 7 days)     ** No results found for the last 168 hours. **        All pertinent labs from the last 24 hours have been reviewed.    Significant Diagnostics:  I have reviewed all pertinent imaging results/findings within the past 24 hours.

## 2017-09-30 NOTE — PLAN OF CARE
Problem: Patient Care Overview  Goal: Plan of Care Review  Poc reviewed with pt. Including safety measures to reduce risk of falls, frequent repositioning to reduce risk of pressure ulcer development, glycemic management, medication education,  and importance of and ways in which to optimize nutrition. Pt. Verbalized understanding.  Will cont. To monitor.

## 2017-09-30 NOTE — PLAN OF CARE
Problem: Occupational Therapy Goal  Goal: Occupational Therapy Goal  Goals set 9/27 to be addressed for 7 days with expiration date, 10/4:  Patient will increase functional independence with ADLs by performing:    Patient will demonstrate rolling to the right with SBA assist.  Not met   Patient will demonstrate rolling to the left with SBA assist.   Not met  Patient will demonstrate supine -sit with SBA  assist.   Not met  Patient will demonstrate stand pivot transfers with min assist.   Not met  Patient will demonstrate grooming while standing with min assist.   Not met  Patient will demonstrate upper body dressing with SBA assist while seated EOB.   Not met  Patient will demonstrate lower body dressing with min assist while seated EOB.   Not met  Patient will demonstrate toileting with min assist.   Not met  Patient's family / caregiver will demonstrate independence and safety with assisting patient with self-care skills and functional mobility.     Not met  Patient and/or patient's family will verbalize understanding of stroke prevention guidelines, personal risk factors and stroke warning signs via teachback method.  Not met          Goals updated 2* progress.  JASMINA Kidd  9/30/2017

## 2017-09-30 NOTE — PROGRESS NOTES
[facility]  Vascular Neurology  Comprehensive Stroke Center  Progress Note    Assessment/Plan:     68 yo M with PMHx of HTN, HLD, previous PEs on rivaroxaban [place] [facility] [place] with sudden onset of headache, vertigo, and nausea with multiple episodes of emesis.  CT head showing ICH approx 2.6 cm.  No falls despite vertigo.  No previous history of stroke.  Symptoms are persistent but have not worsened since onset.  Now on nicardipine gtt for BP control.    9/28/17 patient neurologically stable, off cardene, MRI pending, plans for stepdown  9/29 Pt neurologically stable. Plans for stepdown. Therapy recommending inpatient rehab.    * Nontraumatic intracerebral hemorrhage of cerebellum    68 yo M with PMHx HTN, HLD, PEs on rivaroxaban, and prostate cancer [place] [facility] [place]27[place] after sudden onset of headache, vertigo, and n/v approx 10-11 pm 09/26/17. CT head showing 2.6 cm L cerebellar hemorrhage.     Antiplatelets: none, ICH, hold anticoagulation 4 weeks; Will talk to hematology oncology on whether NOAC is suitable for this patient in the setting of previous DVT with hx of prostate cancer and potential hypercoagulable state.  Statins: atorvastatin 40  SBP <160  DVT ppx: SCDs  PT/OT and speech recommending IP Rehab  Neuro checks q4h        Vasogenic cerebral edema    2/2 infarct  MRI showing edema with mass effect in the 4th ventricle. No hydrocephalus         Acute deep vein thrombosis (DVT) of proximal vein of left lower extremity    US BLE negative        History of blood clots    -PE in 2012  -DVT in 2016  -hx of prostate cancer  -will hold anticoagulation for 4 weeks; Will talk to H/O on whether to use NOAC        High cholesterol      Atorvastatin 40 mg po qd        HTN (hypertension)    Possible etiology of ICH  Off cardene  Continue home meds        Prostate cancer    -Risk factor for increased clotting  -follow up with urologist and oncologist  -history of PE  2012  -history of DVT 2016  -recommend holding xarelto for at least 1 week due to ICH            Neurologic Chief Complaint: L Cerebellar ICH    Subjective:     Interval History: Patient is seen for follow-up neurological assessment and treatment recommendations: No acute events overnight. Patient continues to endorse dizziness. Will continue to monitor.    HPI, Past Medical, Family, and Social History remains the same as documented in the initial encounter.     Review of Systems   Constitutional: Positive for fatigue. Negative for fever.   HENT: Negative for rhinorrhea and sore throat.    Respiratory: Negative for cough and shortness of breath.    Gastrointestinal: Negative for abdominal pain and nausea.   Genitourinary: Negative for dysuria and frequency.   Musculoskeletal: Negative for arthralgias and myalgias.   Neurological: Positive for dizziness. Negative for weakness and headaches.   Psychiatric/Behavioral: Negative for agitation and confusion.     Scheduled Meds:   amlodipine  10 mg Oral Daily    atorvastatin  40 mg Oral QHS    hydrALAZINE  75 mg Oral Q8H    levetiracetam  500 mg Oral BID    lisinopril  40 mg Oral Daily    polyethylene glycol  17 g Oral Daily    senna-docusate 8.6-50 mg  1 tablet Oral BID    sodium chloride 0.9%  3 mL Intravenous Q8H     Continuous Infusions:   sodium chloride 0.9% 75 mL/hr at 09/30/17 0835     PRN Meds:acetaminophen, hydrALAZINE, labetalol, magnesium oxide, magnesium oxide, ondansetron, potassium chloride 10%, potassium chloride 10%, potassium chloride 10%, potassium, sodium phosphates, potassium, sodium phosphates, potassium, sodium phosphates    Objective:     Vital Signs (Most Recent):  Temp: 99 °F (37.2 °C) (09/30/17 0756)  Pulse: 89 (09/30/17 0756)  Resp: 18 (09/30/17 0756)  BP: (!) 130/59 (09/30/17 0756)  SpO2: (!) 93 % (09/30/17 0756)  BP Location: Right arm    Vital Signs Range (Last 24H):  Temp:  [97.3 °F (36.3 °C)-99.2 °F (37.3 °C)]   Pulse:  []    Resp:  [18-24]   BP: (128-152)/(58-72)   SpO2:  [92 %-97 %]   BP Location: Right arm    Physical Exam   Constitutional: He is oriented to person, place, and time. He appears well-developed and well-nourished. No distress.   HENT:   Head: Normocephalic and atraumatic.   Eyes: Conjunctivae and EOM are normal.   Neck: Normal range of motion. No tracheal deviation present.   Pulmonary/Chest: Effort normal. No respiratory distress.   Musculoskeletal: Normal range of motion. He exhibits no tenderness.   Neurological: He is alert and oriented to person, place, and time. No sensory deficit. Coordination abnormal.   Skin: Skin is warm and dry. No rash noted.   Psychiatric: He has a normal mood and affect. His behavior is normal. Judgment and thought content normal.       Neurological Exam:   LOC: alert and follows requests  Language: No aphasia  Speech: Dysarthria  Orientation: Person, Place, Time  Visual Fields (CN II): Hemianopsia  right  EOM (CN III, IV, VI): Full/intact  Facial Sensation (CN V): Symmetric  Facial Movement (CN VII): symmetric facial expression  Tongue (CN XII): to midline  Motor*: Arm Left:  Normal (5/5), Leg Left:   Normal (5/5), Arm Right:   Normal (5/5), Leg Right:   Normal (5/5)  Cerebellar*: Finger/Nose Abnormal - BUE  Sensation: intact to light touch     NIH Stroke Scale:  Interval: 7 days or at discharge (whichever comes first)  Level of Consciousness: 0 - alert  LOC Questions: 0 - answers both correctly  LOC Commands: 0 - performs both correctly  Best Gaze: 0 - normal  Visual: 1 - partial hemianopia  Facial Palsy: 0 - normal  Motor Left Arm: 1 - drift  Motor Right Arm: 0 - no drift  Motor Left Le - drift  Motor Right Le - no drift  Limb Ataxia: 2 - present in two limbs  Sensory: 0 - normal  Best Language: 0 - no aphasia  Dysarthria: 0 - normal articulation  Extinction and Inattention: 0 - no neglect      Laboratory:  CMP:   Recent Labs  Lab 17  0352   CALCIUM 9.4   ALBUMIN 3.3*    PROT 7.8      K 3.6   CO2 24      BUN 6*   CREATININE 0.8   ALKPHOS 96   ALT 16   AST 19   BILITOT 0.8     CBC:   Recent Labs  Lab 09/30/17  0352   WBC 5.34   RBC 5.14   HGB 14.8   HCT 45.6      MCV 89   MCH 28.8   MCHC 32.5       Diagnostic Results:  I have personally reviewed: CT Head., MRI Head., and US BLE  Findings:      CT Head. Date: 09/27/17  No significant change from prior.  Left cerebellar acute/recent intraparenchymal hemorrhage relatively stable. No evidence for extra-axial extension.  Slight mass effect on the fourth ventricle without hydrocephalus.  Superimposed Age-appropriate generalized cerebral volume loss with mild/moderate degree of patchy decreased attenuation supratentorial white matter suggestive for chronic microvascular ischemic change similarly seen on the prior.   Remote left parasagittal parietal/occipital infarct unchanged with stable bilateral thalamic remote lacunar type infarcts.     MRI Head. 9/28/17  1.  Acute parenchymal hemorrhage involving the paramedian superior left cerebellum with adjacent quan-hemorrhage edema and localized mass effect on the fourth ventricle without evidence of hydrocephalus.  Allowing for intrinsic T1 hyperintensity, no definite abnormal enhancement is identified within this region.  Further evaluation with repeat contrast-enhanced MRI examination can be performed following resolution of acute hemorrhage as clinically warranted.  2.  Findings suggestive of significant chronic microvascular ischemic change, remote infarction of the paramedian left occipital lobe as well as multiple remote lacunar type infarcts in the basal ganglia, thalami and sebastian.  Few small foci of increased diffusivity within the corona radiata without corresponding hypointense signal on ADC maps likely reflecting sequela of prior infarction.     US BLE. 9/27/17  No evidence of deep venous thrombosis bilaterally.  Resolution of prior left popliteal to peroneal  thrombus.     Echo. 9/29/17    1 - Concentric remodeling.     2 - Normal left ventricular systolic function (EF 60-65%).     3 - Normal right ventricular systolic function .     4 - Normal left ventricular diastolic function.     5 - No wall motion abnormalities.        Lucas Walker MD  Comprehensive Stroke Center  Department of Vascular Neurology   [facility]

## 2017-09-30 NOTE — PROGRESS NOTES
[facility]  Vascular Neurology  Comprehensive Stroke Center  Progress Note    Assessment/Plan:     66 yo M with PMHx of HTN, HLD, previous PEs on rivaroxaban presents to [facility] ED 09/27/17 with sudden onset of headache, vertigo, and nausea with multiple episodes of emesis.  CT head showing ICH approx 2.6 cm.  No falls despite vertigo.  No previous history of stroke.  Symptoms are persistent but have not worsened since onset.  Now on nicardipine gtt for BP control.    9/28/17 patient neurologically stable, off cardene, MRI pending, plans for stepdown  9/29 Pt neurologically stable. Plans for stepdown. Therapy recommending inpatient rehab.    * Nontraumatic intracerebral hemorrhage of cerebellum    66 yo M with PMHx HTN, HLD, PEs on rivaroxaban, and prostate cancer presents to [facility] ED 09/27/17 after sudden onset of headache, vertigo, and n/v approx 10-11 pm 09/26/17. CT head showing 2.6 cm L cerebellar hemorrhage.     Antiplatelets: none, ICH, hold anticoagulation 4 weeks  Statins: atorvastatin 40  SBP <160  DVT ppx: SCDs  PT/OT and speech recommending IP Rehab  Neuro checks q4h        Vasogenic cerebral edema    2/2 infarct  MRI showing edema with mass effect in the 4th ventricle. No hydrocephalus         History of blood clots    -PE in 2012  -DVT in 2016  -hx of prostate cancer  -will hold anticoagulation for 4 weeks  May need to consider IVC filter        High cholesterol      Atorvastatin 40 mg po qd        HTN (hypertension)    Possible etiology of ICH  Off cardene  Continue home meds        Acute deep vein thrombosis (DVT) of proximal vein of left lower extremity    US BLE negative        Prostate cancer    -Risk factor for increased clotting  -follow up with urologist and oncologist  -history of PE 2012  -history of DVT 2016  -recommend holding xarelto for at least 1 week due to ICH            Neurologic Chief Complaint: L Cerebellar ICH    Subjective:     Interval History: Patient is  seen for follow-up neurological assessment and treatment recommendations: KAYY Still c/o dizziness however states it is improving    HPI, Past Medical, Family, and Social History remains the same as documented in the initial encounter.     Review of Systems   Constitutional: Positive for fatigue. Negative for fever.   HENT: Negative for rhinorrhea and sore throat.    Respiratory: Negative for cough and shortness of breath.    Gastrointestinal: Negative for abdominal pain and nausea.   Genitourinary: Negative for dysuria and frequency.   Musculoskeletal: Negative for arthralgias and myalgias.   Neurological: Positive for dizziness. Negative for weakness and headaches.   Psychiatric/Behavioral: Negative for agitation and confusion.     Scheduled Meds:   amlodipine  10 mg Oral Daily    atorvastatin  40 mg Oral QHS    hydrALAZINE  75 mg Oral Q8H    levetiracetam  500 mg Oral BID    lisinopril  40 mg Oral Daily    polyethylene glycol  17 g Oral Daily    senna-docusate 8.6-50 mg  1 tablet Oral BID    sodium chloride 0.9%  3 mL Intravenous Q8H     Continuous Infusions:   sodium chloride 0.9% 75 mL/hr at 09/29/17 1100     PRN Meds:acetaminophen, hydrALAZINE, labetalol, magnesium oxide, magnesium oxide, ondansetron, potassium chloride 10%, potassium chloride 10%, potassium chloride 10%, potassium, sodium phosphates, potassium, sodium phosphates, potassium, sodium phosphates    Objective:     Vital Signs (Most Recent):  Temp: 99.1 °F (37.3 °C) (09/29/17 1100)  Pulse: 81 (09/29/17 1100)  Resp: (!) 22 (09/29/17 1100)  BP: 137/63 (09/29/17 1100)  SpO2: 95 % (09/29/17 1100)  BP Location: Left arm    Vital Signs Range (Last 24H):  Temp:  [97.6 °F (36.4 °C)-99.1 °F (37.3 °C)]   Pulse:  []   Resp:  [17-28]   BP: (134-177)/(63-80)   SpO2:  [91 %-100 %]   BP Location: Left arm    Physical Exam   Constitutional: He is oriented to person, place, and time. He appears well-developed and well-nourished. No distress.    HENT:   Head: Normocephalic and atraumatic.   Eyes: Conjunctivae and EOM are normal.   Neck: Normal range of motion. No tracheal deviation present.   Pulmonary/Chest: Effort normal. No respiratory distress.   Musculoskeletal: Normal range of motion. He exhibits no tenderness.   Neurological: He is alert and oriented to person, place, and time. No sensory deficit. Coordination abnormal.   Skin: Skin is warm and dry. No rash noted.   Psychiatric: He has a normal mood and affect. His behavior is normal. Judgment and thought content normal.       Neurological Exam:   LOC: alert and follows requests  Language: No aphasia  Speech: Dysarthria  Orientation: Person, Place, Time  Visual Fields (CN II): Hemianopsia  right  EOM (CN III, IV, VI): Full/intact  Facial Sensation (CN V): Symmetric  Facial Movement (CN VII): symmetric facial expression  Tongue (CN XII): to midline  Motor*: Arm Left:  Normal (5/5), Leg Left:   Normal (5/5), Arm Right:   Normal (5/5), Leg Right:   Normal (5/5)  Cerebellar*: Finger/Nose Abnormal - BUE  Sensation: intact to light touch    NIH Stroke Scale:  Interval: 7 days or at discharge (whichever comes first)  Level of Consciousness: 0 - alert  LOC Questions: 0 - answers both correctly  LOC Commands: 0 - performs both correctly  Best Gaze: 0 - normal  Visual: 1 - partial hemianopia  Facial Palsy: 0 - normal  Motor Left Arm: 0 - no drift  Motor Right Arm: 0 - no drift  Motor Left Le - no drift  Motor Right Le - no drift  Limb Ataxia: 2 - present in two limbs  Sensory: 0 - normal  Best Language: 0 - no aphasia  Dysarthria: 0 - normal articulation  Extinction and Inattention: 0 - no neglect  NIH Stroke Scale Total: 3      Laboratory:  CMP:   Recent Labs  Lab 17   CALCIUM 9.0   ALBUMIN 3.2*   PROT 8.1      K 4.6   CO2 21*      BUN 6*   CREATININE 0.8   ALKPHOS 98   ALT 17   AST 26   BILITOT 0.5     CBC:   Recent Labs  Lab 17   WBC 5.63   RBC 5.16   HGB 15.0    HCT 46.3      MCV 90   MCH 29.1   MCHC 32.4     Lipid Panel:   Recent Labs  Lab 09/28/17  0208   CHOL 177   LDLCALC 122.2   HDL 41   TRIG 69     Coagulation:   Recent Labs  Lab 09/28/17 0208   INR 1.0   APTT 24.0     Hgb A1C:   Recent Labs  Lab 09/28/17 0208   HGBA1C 4.9     TSH:   Recent Labs  Lab 09/28/17  0208   TSH 0.278*       Diagnostic Results:  I have personally reviewed: CT Head., MRI Head., and US BLE  Findings:     CT Head. Date: 09/27/17  No significant change from prior.  Left cerebellar acute/recent intraparenchymal hemorrhage relatively stable. No evidence for extra-axial extension.  Slight mass effect on the fourth ventricle without hydrocephalus.  Superimposed Age-appropriate generalized cerebral volume loss with mild/moderate degree of patchy decreased attenuation supratentorial white matter suggestive for chronic microvascular ischemic change similarly seen on the prior.   Remote left parasagittal parietal/occipital infarct unchanged with stable bilateral thalamic remote lacunar type infarcts.    MRI Head. 9/28/17  1.  Acute parenchymal hemorrhage involving the paramedian superior left cerebellum with adjacent quan-hemorrhage edema and localized mass effect on the fourth ventricle without evidence of hydrocephalus.  Allowing for intrinsic T1 hyperintensity, no definite abnormal enhancement is identified within this region.  Further evaluation with repeat contrast-enhanced MRI examination can be performed following resolution of acute hemorrhage as clinically warranted.  2.  Findings suggestive of significant chronic microvascular ischemic change, remote infarction of the paramedian left occipital lobe as well as multiple remote lacunar type infarcts in the basal ganglia, thalami and sebastian.  Few small foci of increased diffusivity within the corona radiata without corresponding hypointense signal on ADC maps likely reflecting sequela of prior infarction.    US BLE. 9/27/17  No evidence of  deep venous thrombosis bilaterally.  Resolution of prior left popliteal to peroneal thrombus.    Echo. 9/29/17    1 - Concentric remodeling.     2 - Normal left ventricular systolic function (EF 60-65%).     3 - Normal right ventricular systolic function .     4 - Normal left ventricular diastolic function.     5 - No wall motion abnormalities.       Amber Gomez PA-C  Comprehensive Stroke Center  Department of Vascular Neurology   [facility]

## 2017-09-30 NOTE — SUBJECTIVE & OBJECTIVE
Neurologic Chief Complaint: L Cerebellar ICH    Subjective:     Interval History: Patient is seen for follow-up neurological assessment and treatment recommendations: No acute events overnight. Patient continues to endorse dizziness. Will continue to monitor.    HPI, Past Medical, Family, and Social History remains the same as documented in the initial encounter.     Review of Systems   Constitutional: Positive for fatigue. Negative for fever.   HENT: Negative for rhinorrhea and sore throat.    Respiratory: Negative for cough and shortness of breath.    Gastrointestinal: Negative for abdominal pain and nausea.   Genitourinary: Negative for dysuria and frequency.   Musculoskeletal: Negative for arthralgias and myalgias.   Neurological: Positive for dizziness. Negative for weakness and headaches.   Psychiatric/Behavioral: Negative for agitation and confusion.     Scheduled Meds:   amlodipine  10 mg Oral Daily    atorvastatin  40 mg Oral QHS    hydrALAZINE  75 mg Oral Q8H    levetiracetam  500 mg Oral BID    lisinopril  40 mg Oral Daily    polyethylene glycol  17 g Oral Daily    senna-docusate 8.6-50 mg  1 tablet Oral BID    sodium chloride 0.9%  3 mL Intravenous Q8H     Continuous Infusions:   sodium chloride 0.9% 75 mL/hr at 09/30/17 0835     PRN Meds:acetaminophen, hydrALAZINE, labetalol, magnesium oxide, magnesium oxide, ondansetron, potassium chloride 10%, potassium chloride 10%, potassium chloride 10%, potassium, sodium phosphates, potassium, sodium phosphates, potassium, sodium phosphates    Objective:     Vital Signs (Most Recent):  Temp: 99 °F (37.2 °C) (09/30/17 0756)  Pulse: 89 (09/30/17 0756)  Resp: 18 (09/30/17 0756)  BP: (!) 130/59 (09/30/17 0756)  SpO2: (!) 93 % (09/30/17 0756)  BP Location: Right arm    Vital Signs Range (Last 24H):  Temp:  [97.3 °F (36.3 °C)-99.2 °F (37.3 °C)]   Pulse:  []   Resp:  [18-24]   BP: (128-152)/(58-72)   SpO2:  [92 %-97 %]   BP Location: Right arm    Physical  Exam   Constitutional: He is oriented to person, place, and time. He appears well-developed and well-nourished. No distress.   HENT:   Head: Normocephalic and atraumatic.   Eyes: Conjunctivae and EOM are normal.   Neck: Normal range of motion. No tracheal deviation present.   Pulmonary/Chest: Effort normal. No respiratory distress.   Musculoskeletal: Normal range of motion. He exhibits no tenderness.   Neurological: He is alert and oriented to person, place, and time. No sensory deficit. Coordination abnormal.   Skin: Skin is warm and dry. No rash noted.   Psychiatric: He has a normal mood and affect. His behavior is normal. Judgment and thought content normal.       Neurological Exam:   LOC: alert and follows requests  Language: No aphasia  Speech: Dysarthria  Orientation: Person, Place, Time  Visual Fields (CN II): Hemianopsia  right  EOM (CN III, IV, VI): Full/intact  Facial Sensation (CN V): Symmetric  Facial Movement (CN VII): symmetric facial expression  Tongue (CN XII): to midline  Motor*: Arm Left:  Normal (5/5), Leg Left:   Normal (5/5), Arm Right:   Normal (5/5), Leg Right:   Normal (5/5)  Cerebellar*: Finger/Nose Abnormal - BUE  Sensation: intact to light touch     NIH Stroke Scale:  Interval: 7 days or at discharge (whichever comes first)  Level of Consciousness: 0 - alert  LOC Questions: 0 - answers both correctly  LOC Commands: 0 - performs both correctly  Best Gaze: 0 - normal  Visual: 1 - partial hemianopia  Facial Palsy: 0 - normal  Motor Left Arm: 1 - drift  Motor Right Arm: 0 - no drift  Motor Left Le - drift  Motor Right Le - no drift  Limb Ataxia: 2 - present in two limbs  Sensory: 0 - normal  Best Language: 0 - no aphasia  Dysarthria: 0 - normal articulation  Extinction and Inattention: 0 - no neglect      Laboratory:  CMP:   Recent Labs  Lab 17  0352   CALCIUM 9.4   ALBUMIN 3.3*   PROT 7.8      K 3.6   CO2 24      BUN 6*   CREATININE 0.8   ALKPHOS 96   ALT 16   AST 19    BILITOT 0.8     CBC:   Recent Labs  Lab 09/30/17  0352   WBC 5.34   RBC 5.14   HGB 14.8   HCT 45.6      MCV 89   MCH 28.8   MCHC 32.5       Diagnostic Results:  I have personally reviewed: CT Head., MRI Head., and US BLE  Findings:      CT Head. Date: 09/27/17  No significant change from prior.  Left cerebellar acute/recent intraparenchymal hemorrhage relatively stable. No evidence for extra-axial extension.  Slight mass effect on the fourth ventricle without hydrocephalus.  Superimposed Age-appropriate generalized cerebral volume loss with mild/moderate degree of patchy decreased attenuation supratentorial white matter suggestive for chronic microvascular ischemic change similarly seen on the prior.   Remote left parasagittal parietal/occipital infarct unchanged with stable bilateral thalamic remote lacunar type infarcts.     MRI Head. 9/28/17  1.  Acute parenchymal hemorrhage involving the paramedian superior left cerebellum with adjacent quan-hemorrhage edema and localized mass effect on the fourth ventricle without evidence of hydrocephalus.  Allowing for intrinsic T1 hyperintensity, no definite abnormal enhancement is identified within this region.  Further evaluation with repeat contrast-enhanced MRI examination can be performed following resolution of acute hemorrhage as clinically warranted.  2.  Findings suggestive of significant chronic microvascular ischemic change, remote infarction of the paramedian left occipital lobe as well as multiple remote lacunar type infarcts in the basal ganglia, thalami and sebastian.  Few small foci of increased diffusivity within the corona radiata without corresponding hypointense signal on ADC maps likely reflecting sequela of prior infarction.     US BLE. 9/27/17  No evidence of deep venous thrombosis bilaterally.  Resolution of prior left popliteal to peroneal thrombus.     Echo. 9/29/17    1 - Concentric remodeling.     2 - Normal left ventricular systolic function  (EF 60-65%).     3 - Normal right ventricular systolic function .     4 - Normal left ventricular diastolic function.     5 - No wall motion abnormalities.

## 2017-09-30 NOTE — ASSESSMENT & PLAN NOTE
68 yo M with PMHx HTN, HLD, PEs on rivaroxaban, and prostate cancer presents to [facility] ED 09/27/17 after sudden onset of headache, vertigo, and n/v approx 10-11 pm 09/26/17. CT head showing 2.6 cm L cerebellar hemorrhage.     Antiplatelets: none, ICH, hold anticoagulation 4 weeks; Will talk to hematology oncology on whether NOAC is suitable for this patient in the setting of previous DVT with hx of prostate cancer and potential hypercoagulable state.  Statins: atorvastatin 40  SBP <160  DVT ppx: SCDs  PT/OT and speech recommending IP Rehab  Neuro checks q4h

## 2017-09-30 NOTE — PT/OT/SLP PROGRESS
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  Pt agreeable to PT session    Pain/Comfort  Pain Rating 1: 0/10  Pain Rating Post-Intervention 1: 0/10    Objective:   Patient found with: bed alarm, telemetry, peripheral IV (RN disconnects from IV for OOB mobility.  pt found sup in bed with gown and linen wet with urine.  NO family present)        FUNCTIONAL MOBILITY    Bed Mobility (with vc's for sequencing and safe technique of functional mobility):        Sup > sit at the EOB with min A for trunk elevation from L side lying        Sit > sup with SBA       Scooting hips to the EOB upon sitting with min A x2 scoots          Transfers  (vc's for hand placement and safety of task)       Sit > stand from EOB with no AD requiring CG/min A for balance       Stand > sit to EOB requiring CG/min A for controlled descent    Gait        Distance: 95ft        Assistive Device: no AD       Assistance Required: min A to facilitate trunk ext, hip extension and balance       Verbal Cues required: for postural control, appropriate weight shift, sequencing,                               B step length, gina and safety       Gait Deviations: Pt demonstrates decreased gina, increased time in double stance,                              Decreased B step length, decreased stride length, narrow base of support,                               decreased toe-to-floor clearance, decreased weight-shifting ability,                             foot flat and R lateral lean      THERAPEUTIC ACTIVITIES     SITTING       Pt tolerates sitting at the EOB with SBA for balanc3 and b UE support.  Pt demonstrates        Flexed thoracic spine, rounded shoulders and PPT.   Pt requires assistance to don house robe prior to standing    STANDING        Pt tolerates standing with no AD requiring min/CGA for balance with vc's.      DYNAMIC STANDING BALANCE          Pt performs lateral stepping to the L with no AD with focus on appropriate weight shift, step length and posture    Education:  Education provided to pt regarding: postural control, weight shift, B step length and safety.    Whiteboard updated with correct mobility information. RN/PCT notified.  Pt safe to amb to bathroom with RN/PCT: Use no AD & 1 person assist.    AM-PAC 6 CLICK MOBILITY  How much help from another person does this patient currently need?   1 = Unable, Total/Dependent Assistance  2 = A lot, Maximum/Moderate Assistance  3 = A little, Minimum/Contact Guard/Supervision  4 = None, Modified [place]/Independent    Turning over in bed (including adjusting bedclothes, sheets and blankets)?: 3  Sitting down on and standing up from a chair with arms (e.g., wheelchair, bedside commode, etc.): 3  Moving from lying on back to sitting on the side of the bed?: 3  Moving to and from a bed to a chair (including a wheelchair)?: 3  Need to walk in hospital room?: 3  Climbing 3-5 steps with a railing?: 2  Total Score: 17    AM-PAC Raw Score CMS G-Code Modifier Level of Impairment Assistance   6 % Total / Unable   7 - 9 CM 80 - 100% Maximal Assist   10 - 14 CL 60 - 80% Moderate Assist   15 - 19 CK 40 - 60% Moderate Assist   20 - 22 CJ 20 - 40% Minimal Assist   23 CI 1-20% SBA / CGA   24 CH 0% Independent/ Mod I     Patient left supine with all lines intact, call button in reach, bed alarm on and RN  notified.    Assessment:  Otis Salcido is a 67 y.o. 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  Pt will cont to benefit from skilled PT intervention to address deficits and improve functional mobility.     Rehab identified problem list/impairments: Rehab identified problem list/impairments: weakness, impaired endurance, impaired sensation, impaired self care skills, impaired functional mobilty, gait instability, impaired balance, decreased coordination, decreased upper extremity function, decreased lower extremity function, decreased safety awareness    Rehab potential is good.    Activity tolerance: Good    Discharge recommendations: Discharge Facility/Level Of Care Needs: rehabilitation facility     Barriers to discharge: Barriers to Discharge: Inaccessible home environment, Decreased caregiver support Pt lives alone    Equipment recommendations: Equipment Needed After Discharge:  (TBD)     GOALS:    Physical Therapy Goals        Problem: Physical Therapy Goal    Goal Priority Disciplines Outcome Goal Variances Interventions   Physical Therapy Goal     PT/OT, PT Ongoing (interventions implemented as appropriate)     Description:  PT goals until 10/6/17     1. Pt supine to sit with CGA-not met  2. Pt sit to supine with CGA-not met  3. Pt sit to stand with or without RW as needed for safety with CGA-not met  4. Pt to perform gait 100ft with or without RW as needed for safety with CGA.-not met  5. Pt to transfer bed to/from bedside chair with CGA.-not met  6. Pt to perform B LE exs in sitting x 20 reps with handout.-not met                      PLAN:    Patient to be seen 5 x/week  to address the above listed problems via gait training, therapeutic activities, therapeutic exercises, neuromuscular re-education  Plan of Care expires:  10/27/17  Plan of Care reviewed with: patient         Meera POWER Adrian, PTA  09/30/2017

## 2017-09-30 NOTE — PROGRESS NOTES
[facility]  Neurosurgery  Progress Note    Subjective:     History of Present Illness: 67M with PMH of DVTs (on Xarelto), TIA (on ASA 81), HTN, HLD who presented to the ED for HA with nausea and vomiting. On presentation he demonstrated dysmetria L>RUEs, but was otherwise intact, AOx3, GCS15. CTH shows cerebellar ICH with ICH score of 1. He will be admitted to the [facility] for further management with NSGY following.    Post-Op Info:  * No surgery found *             Medications:  Continuous Infusions:   sodium chloride 0.9% 75 mL/hr at 09/29/17 1600     Scheduled Meds:   amlodipine  10 mg Oral Daily    atorvastatin  40 mg Oral QHS    hydrALAZINE  75 mg Oral Q8H    levetiracetam  500 mg Oral BID    lisinopril  40 mg Oral Daily    polyethylene glycol  17 g Oral Daily    senna-docusate 8.6-50 mg  1 tablet Oral BID    sodium chloride 0.9%  3 mL Intravenous Q8H     PRN Meds:acetaminophen, hydrALAZINE, labetalol, magnesium oxide, magnesium oxide, ondansetron, potassium chloride 10%, potassium chloride 10%, potassium chloride 10%, potassium, sodium phosphates, potassium, sodium phosphates, potassium, sodium phosphates     Review of Systems   Constitutional: Negative for chills and fever.   All other systems reviewed and are negative.    Objective:     Weight: 82.1 kg (181 lb)  Body mass index is 28.35 kg/m².  Vital Signs (Most Recent):  Temp: 99 °F (37.2 °C) (09/30/17 0756)  Pulse: 89 (09/30/17 0756)  Resp: 18 (09/30/17 0756)  BP: (!) 130/59 (09/30/17 0756)  SpO2: (!) 93 % (09/30/17 0756) Vital Signs (24h Range):  Temp:  [97.3 °F (36.3 °C)-99.2 °F (37.3 °C)] 99 °F (37.2 °C)  Pulse:  [] 89  Resp:  [18-24] 18  SpO2:  [92 %-97 %] 93 %  BP: (128-157)/(58-76) 130/59                           Physical Exam:    Constitutional: He appears well-developed and well-nourished. He is not diaphoretic. No distress.     Eyes: Pupils are equal, round, and reactive to light. Right eye exhibits no discharge.  Left eye exhibits no discharge.     Cardiovascular: Normal rate.     Psych/Behavior: He is alert. He is oriented to person, place, and time. He has a normal mood and affect.     Musculoskeletal:        Right Upper Extremities: Range of motion is full. Muscle strength is 5/5. Tone is normal.        Left Upper Extremities: Range of motion is full. Muscle strength is 5/5. Tone is normal.       Right Lower Extremities: Range of motion is full. There is no tenderness. Muscle strength is 5/5. Tone is normal.        Left Lower Extremities: Range of motion is full. There is no tenderness. Muscle strength is 5/5. Tone is normal.     Neurological:        Coordination:   LUE pronator drift       Cranial nerves: Cranial nerve(s) II, III, IV, V, VI, VII, VIII, IX, X, XI and XII are intact.       Significant Labs:    Recent Labs  Lab 09/28/17  1214 09/28/17  1814 09/29/17 0132 09/30/17  0352   GLU  --   --  91 99   NA  --   --  137 139   K 4.2  --  4.6 3.6   CL  --   --  107 106   CO2  --   --  21* 24   BUN  --   --  6* 6*   CREATININE  --   --  0.8 0.8   CALCIUM  --   --  9.0 9.4   MG  --  2.1 2.3 2.1       Recent Labs  Lab 09/29/17 0132 09/30/17  0352   WBC 5.63 5.34   HGB 15.0 14.8   HCT 46.3 45.6    206     No results for input(s): LABPT, INR, APTT in the last 48 hours.  Microbiology Results (last 7 days)     ** No results found for the last 168 hours. **        All pertinent labs from the last 24 hours have been reviewed.    Significant Diagnostics:  I have reviewed all pertinent imaging results/findings within the past 24 hours.    Assessment/Plan:     * Nontraumatic intracerebral hemorrhage of cerebellum    67M on xarelto for DVT with PMH HTN, prostate CA, HLD, TIAs (on ASA) who presents with cerebellar ICH (ICH score 1).    Neuro:  --continue medical management per primary team   --Q4h neuro checks  --Repeat head CT scan is stable   --HOB >30  --Neurosurgery will sign off at this time    CVS:  --SBP goals <160 on  cardene (hydralazine, labetalol, amlodipine PRN)    Pulm:  --Aggressive pulmonary management  --Insentive spirometry & Duonebs PRN    Heme/onc/ID:  --CBC/CMP/Coags daily  --PCC to reverse Xarelto    F/E/GI  --Na 140-150  --Diet per swallow eval  --PPI  --Laxatives for bowel care    Ppx:  -DVT ppx per NCC  -PPI    Dispo:  -PT/OT/OOB  -Speech eval  -Consult social work for dispo  - follow up in neurosurgery clinic in 8 weeks with MRI brain w/wo with Dr. Hall  - Neurosurgery will sign off at this time            Comfort Millard MD  Neurosurgery  [facility]

## 2017-09-30 NOTE — ASSESSMENT & PLAN NOTE
-PE in 2012  -DVT in 2016  -hx of prostate cancer  -will hold anticoagulation for 4 weeks; Will talk to H/O on whether to use NOAC

## 2017-09-30 NOTE — PT/OT/SLP PROGRESS
"Occupational Therapy  Treatment/Goals Revised    Otis Salcido   MRN: [id number]   Admitting Diagnosis: Nontraumatic intracerebral hemorrhage of cerebellum    OT Date of Treatment: 09/30/17   OT Start Time: 0845  OT Stop Time: 0930  OT Total Time (min): 45 min    Billable Minutes:  Self Care/Home Management 30 and Neuromuscular Re-education 15    General Precautions: Standard, aspiration, fall  Orthopedic Precautions: N/A  Braces: N/A    Do you have any cultural, spiritual, Mormonism conflicts, given your current situation?: Holiness    Subjective:  Communicated with nurse prior to session.  Patient:  "I don't have a headache today." "I am doing better standing today when using the urinal."  Pain/Comfort  Pain Rating 1: 0/10  Pain Rating Post-Intervention 1: 0/10    Objective:  Patient found with: telemetry, oxygen  Family not present.    Functional Mobility:  Bed Mobility:  Rolling/Turning to Left: Supervision  Rolling/Turning Right: Supervision  Scooting/Bridging: Supervision  Supine to Sit: Stand by Assistance (from the right side)    Transfers:   Sit <> Stand Assistance: Minimum Assistance  Sit <> Stand Assistive Device: No Assistive Device  Bed <> Chair Technique: Stand Pivot  Bed <> Chair Transfer Assistance: Minimum Assistance    Activities of Daily Living:  UE Dressing Level of Assistance: Minimum assistance  LE Dressing Level of Assistance: Minimum assistance  Grooming Position: Standing  Grooming Level of Assistance: Minimum assistance  Toileting Where Assessed: Toilet  Toileting Level of Assistance: Minimum assistance     Additional Treatment:   Patient education provided on role of OT and need for rehab upon discharge.  Patient verbalizing understanding via teach back method. Patient instructed on need to call for assistance for toileting needs and when getting up.  Continued education, patient/ family training recommended.  Patient alert and oriented x 3; able to follow 4/4 one step commands.  SBA with " "static sitting balance EOB; min assist with dynamic standing balance.    Patient's functional status and disposition recommendation discussed with patient.  White board updated in patient's room.  OT asked if there were any other questions; patient/ family had no further questions.     AM-PAC 6 CLICK ADL   How much help from another person does this patient currently need?   1 = Unable, Total/Dependent Assistance  2 = A lot, Maximum/Moderate Assistance  3 = A little, Minimum/Contact Guard/Supervision  4 = None, Modified [place]/Independent    Putting on and taking off regular lower body clothing? : 3  Bathing (including washing, rinsing, drying)?: 3  Toileting, which includes using toilet, bedpan, or urinal? : 3  Putting on and taking off regular upper body clothing?: 3  Taking care of personal grooming such as brushing teeth?: 3  Eating meals?: 3  Total Score: 18     AM-PAC Raw Score CMS "G-Code Modifier Level of Impairment Assistance   6 % Total / Unable   7 - 8 CM 80 - 100% Maximal Assist   9-13 CL 60 - 80% Moderate Assist   14 - 19 CK 40 - 60% Moderate Assist   20 - 22 CJ 20 - 40% Minimal Assist   23 CI 1-20% SBA / CGA   24 CH 0% Independent/ Mod I       Patient left supine with all lines intact and call button in reach    Assessment:  Otis Salcido is a 67 y.o. male with a medical diagnosis of ICH and presents with performance deficits of physical skills including impaired balance, mobility, dexterity, fine motor coordination, gross motor coordination, and endurance; demonstrating performance deficits of cognitive skills including impaired  attention, problem solving, sequencing and memory all resulting in inability organizing occupational performance in a timely and safe manner; demonstrating performance deficits of psychosocial skills including impairments of interpersonal interactions and coping strategies which are skills necessary to successfully and appropriately participate in everyday " tasks and social situations.  These performance deficits have resulted in activity limitations including but not limited to:   bed mobility, transfers, ascending/ descending stairs, walking short and long distances, walking around obstacles, transitional movement patterns (kneeling, bending); eating, upper body dressing, lower body dressing, brushing teeth, toileting, bathing, carrying objects, typing, writing, reading, balancing checkbook, shopping, meal preparation, fishing and hunting.   Patient's role as father, grandfather, and independent caretaker for self has been affected. Patient will benefit from skilled OT services to maximize level of independence with self-care skills and functional mobility.  Will benefit from Rehab.  Significant improvements noted with transfers and standing balance.    Rehab identified problem list/impairments: Rehab identified problem list/impairments: weakness, impaired endurance, impaired self care skills, impaired balance, decreased safety awareness, impaired functional mobilty, gait instability, impaired fine motor, impaired coordination    Rehab potential is good.    Activity tolerance: Good    Discharge recommendations: Discharge Facility/Level Of Care Needs: rehabilitation facility     Barriers to discharge: Barriers to Discharge: Inaccessible home environment, Decreased caregiver support    Equipment recommendations: none     GOALS:    Occupational Therapy Goals        Problem: Occupational Therapy Goal    Goal Priority Disciplines Outcome Interventions   Occupational Therapy Goal     OT, PT/OT     Description:  Goals achieved for transfers, lower body dressing and toileting.  Revised goals set 9/30 to be addressed for 7 days with expiration date, 10/7:  Patient will increase functional independence with ADLs by performing:    Patient will demonstrate rolling to the right with modified independence.  Not met   Patient will demonstrate rolling to the left with modified  independence.   Not met  Patient will demonstrate supine -sit with modified independence.   Not met  Patient will demonstrate stand pivot transfers with supervision.   Not met  Patient will demonstrate grooming while standing with modified independence.   Not met  Patient will demonstrate upper body dressing with supervision, including gathering clothing. Not met  Patient will demonstrate lower body dressing with supervision.   Not met  Patient will demonstrate toileting with supervision.   Not met  Patient's family / caregiver will demonstrate independence and safety with assisting patient with self-care skills and functional mobility.     Not met  Patient and/or patient's family will verbalize understanding of stroke prevention guidelines, personal risk factors and stroke warning signs via teachback method.  Not met                            Plan:  Patient to be seen 6 x/week to address the above listed problems via self-care/home management, neuromuscular re-education, cognitive retraining, sensory integration, therapeutic exercises, therapeutic activities  Plan of Care expires: 10/26/17  Plan of Care reviewed with: patient         Val FooteJASMINA mckay  09/30/2017

## 2017-09-30 NOTE — ASSESSMENT & PLAN NOTE
67M on xarelto for DVT with PMH HTN, prostate CA, HLD, TIAs (on ASA) who presents with cerebellar ICH (ICH score 1).    Neuro:  --continue medical management per primary team   --Q4h neuro checks  --Repeat head CT scan is stable   --HOB >30  --Neurosurgery will sign off at this time    CVS:  --SBP goals <160 on cardene (hydralazine, labetalol, amlodipine PRN)    Pulm:  --Aggressive pulmonary management  --Insentive spirometry & Duonebs PRN    Heme/onc/ID:  --CBC/CMP/Coags daily  --PCC to reverse Xarelto    F/E/GI  --Na 140-150  --Diet per swallow eval  --PPI  --Laxatives for bowel care    Ppx:  -DVT ppx per NCC  -PPI    Dispo:  -PT/OT/OOB  -Speech eval  -Consult social work for dispo  - follow up in neurosurgery clinic in 8 weeks with MRI brain w/wo with Dr. Hall  - Neurosurgery will sign off at this time

## 2017-09-30 NOTE — PLAN OF CARE
Problem: Physical Therapy Goal  Goal: Physical Therapy Goal  PT goals until 10/6/17     1. Pt supine to sit with CGA-not met  2. Pt sit to supine with CGA-not met  3. Pt sit to stand with or without RW as needed for safety with CGA-not met  4. Pt to perform gait 100ft with or without RW as needed for safety with CGA.-not met  5. Pt to transfer bed to/from bedside chair with CGA.-not met  6. Pt to perform B LE exs in sitting x 20 reps with handout.-not met           Goals remain appropriate.     Meera Campbell, PTA.  9/30/2017

## 2017-09-30 NOTE — ASSESSMENT & PLAN NOTE
-PE in 2012  -DVT in 2016  -hx of prostate cancer  -will hold anticoagulation for 4 weeks  May need to consider IVC filter

## 2017-09-30 NOTE — PLAN OF CARE
Problem: Patient Care Overview  Goal: Plan of Care Review  Outcome: Ongoing (interventions implemented as appropriate)  POC reviewed with pt at 20:00. VS and neuro checks performed q4hr. Pt remained free from falls; bed alarm remained on and pt instructed to call for out of bed activities. Pt has been voiding with the urinal with no issues. No c/o pain. No acute neuro changes noted overnight. Pt's relative remained at the bedside throughout the night and she was educated on why bed alarm is on and that pt needs to call for assistance when getting up to the bathroom. Will continue to monitor.

## 2017-10-01 LAB
ALBUMIN SERPL BCP-MCNC: 3 G/DL
ALP SERPL-CCNC: 91 U/L
ALT SERPL W/O P-5'-P-CCNC: 14 U/L
ANION GAP SERPL CALC-SCNC: 12 MMOL/L
AST SERPL-CCNC: 16 U/L
BASOPHILS # BLD AUTO: 0.03 K/UL
BASOPHILS NFR BLD: 0.5 %
BILIRUB SERPL-MCNC: 0.6 MG/DL
BUN SERPL-MCNC: 11 MG/DL
CALCIUM SERPL-MCNC: 9 MG/DL
CHLORIDE SERPL-SCNC: 107 MMOL/L
CO2 SERPL-SCNC: 23 MMOL/L
CREAT SERPL-MCNC: 0.9 MG/DL
DIFFERENTIAL METHOD: NORMAL
EOSINOPHIL # BLD AUTO: 0.1 K/UL
EOSINOPHIL NFR BLD: 0.9 %
ERYTHROCYTE [DISTWIDTH] IN BLOOD BY AUTOMATED COUNT: 13.7 %
EST. GFR  (AFRICAN AMERICAN): >60 ML/MIN/1.73 M^2
EST. GFR  (NON AFRICAN AMERICAN): >60 ML/MIN/1.73 M^2
GLUCOSE SERPL-MCNC: 92 MG/DL
HCT VFR BLD AUTO: 43.5 %
HGB BLD-MCNC: 14.1 G/DL
LYMPHOCYTES # BLD AUTO: 1.9 K/UL
LYMPHOCYTES NFR BLD: 33.2 %
MAGNESIUM SERPL-MCNC: 2 MG/DL
MCH RBC QN AUTO: 28.6 PG
MCHC RBC AUTO-ENTMCNC: 32.4 G/DL
MCV RBC AUTO: 88 FL
MONOCYTES # BLD AUTO: 0.7 K/UL
MONOCYTES NFR BLD: 12.5 %
NEUTROPHILS # BLD AUTO: 3 K/UL
NEUTROPHILS NFR BLD: 52.7 %
PHOSPHATE SERPL-MCNC: 3.7 MG/DL
PLATELET # BLD AUTO: 211 K/UL
PMV BLD AUTO: 12.8 FL
POTASSIUM SERPL-SCNC: 3.4 MMOL/L
PROT SERPL-MCNC: 7.1 G/DL
RBC # BLD AUTO: 4.93 M/UL
SODIUM SERPL-SCNC: 142 MMOL/L
WBC # BLD AUTO: 5.75 K/UL

## 2017-10-01 PROCEDURE — 25000003 PHARM REV CODE 250: Performed by: PHYSICIAN ASSISTANT

## 2017-10-01 PROCEDURE — 25000003 PHARM REV CODE 250: Performed by: STUDENT IN AN ORGANIZED HEALTH CARE EDUCATION/TRAINING PROGRAM

## 2017-10-01 PROCEDURE — 99233 SBSQ HOSP IP/OBS HIGH 50: CPT | Mod: GC,,, | Performed by: PSYCHIATRY & NEUROLOGY

## 2017-10-01 PROCEDURE — A4216 STERILE WATER/SALINE, 10 ML: HCPCS | Performed by: NURSE PRACTITIONER

## 2017-10-01 PROCEDURE — 83735 ASSAY OF MAGNESIUM: CPT

## 2017-10-01 PROCEDURE — 25000003 PHARM REV CODE 250: Performed by: NURSE PRACTITIONER

## 2017-10-01 PROCEDURE — 36415 COLL VENOUS BLD VENIPUNCTURE: CPT

## 2017-10-01 PROCEDURE — 20600001 HC STEP DOWN PRIVATE ROOM

## 2017-10-01 PROCEDURE — 84100 ASSAY OF PHOSPHORUS: CPT

## 2017-10-01 PROCEDURE — 80053 COMPREHEN METABOLIC PANEL: CPT

## 2017-10-01 PROCEDURE — 85025 COMPLETE CBC W/AUTO DIFF WBC: CPT

## 2017-10-01 RX ORDER — POTASSIUM CHLORIDE 750 MG/1
30 CAPSULE, EXTENDED RELEASE ORAL ONCE
Status: COMPLETED | OUTPATIENT
Start: 2017-10-01 | End: 2017-10-01

## 2017-10-01 RX ADMIN — Medication 3 ML: at 03:10

## 2017-10-01 RX ADMIN — ACETAMINOPHEN 650 MG: 325 TABLET ORAL at 12:10

## 2017-10-01 RX ADMIN — POTASSIUM CHLORIDE 30 MEQ: 750 CAPSULE, EXTENDED RELEASE ORAL at 08:10

## 2017-10-01 RX ADMIN — Medication 3 ML: at 07:10

## 2017-10-01 RX ADMIN — LEVETIRACETAM 500 MG: 500 TABLET ORAL at 08:10

## 2017-10-01 RX ADMIN — STANDARDIZED SENNA CONCENTRATE AND DOCUSATE SODIUM 1 TABLET: 8.6; 5 TABLET, FILM COATED ORAL at 08:10

## 2017-10-01 RX ADMIN — POLYETHYLENE GLYCOL 3350 17 G: 17 POWDER, FOR SOLUTION ORAL at 08:10

## 2017-10-01 RX ADMIN — LEVETIRACETAM 500 MG: 500 TABLET ORAL at 10:10

## 2017-10-01 RX ADMIN — HYDRALAZINE HYDROCHLORIDE 75 MG: 50 TABLET ORAL at 10:10

## 2017-10-01 RX ADMIN — ACETAMINOPHEN 650 MG: 325 TABLET ORAL at 11:10

## 2017-10-01 RX ADMIN — AMLODIPINE BESYLATE 10 MG: 10 TABLET ORAL at 08:10

## 2017-10-01 RX ADMIN — LISINOPRIL 40 MG: 20 TABLET ORAL at 08:10

## 2017-10-01 RX ADMIN — HYDRALAZINE HYDROCHLORIDE 75 MG: 50 TABLET ORAL at 03:10

## 2017-10-01 RX ADMIN — Medication 3 ML: at 10:10

## 2017-10-01 RX ADMIN — HYDRALAZINE HYDROCHLORIDE 75 MG: 50 TABLET ORAL at 07:10

## 2017-10-01 RX ADMIN — ATORVASTATIN CALCIUM 40 MG: 20 TABLET, FILM COATED ORAL at 10:10

## 2017-10-01 NOTE — PROGRESS NOTES
[facility]  Vascular Neurology  Comprehensive Stroke Center  Progress Note    Assessment/Plan:     68 yo M with PMHx of HTN, HLD, previous PEs on rivaroxaban presents to [facility] [place] ED 09/27/17 with sudden onset of headache, vertigo, and nausea with multiple episodes of emesis.  CT head showing ICH approx 2.6 cm.  No falls despite vertigo.  No previous history of stroke.  Symptoms are persistent but have not worsened since onset.  Now on nicardipine gtt for BP control.    9/28/17 patient neurologically stable, off cardene, MRI pending, plans for stepdown  9/29 Pt neurologically stable. Plans for stepdown. Therapy recommending inpatient rehab.    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 CT head showing 2.6 cm L cerebellar hemorrhage.     Antiplatelets: none, ICH, hold anticoagulation 4 weeks; Will talk to hematology oncology on whether NOAC is suitable for this patient in the setting of previous DVT with hx of prostate cancer and potential hypercoagulable state.  Statins: atorvastatin 40  SBP <160  DVT ppx: SCDs  PT/OT and speech recommending IP Rehab  Neuro checks q4h        Vasogenic cerebral edema    2/2 infarct  MRI showing edema with mass effect in the 4th ventricle. No hydrocephalus         History of deep venous thrombosis (DVT) of distal vein of left lower extremity 11/2016    US BLE negative        History of blood clots    -PE in 2012  -DVT in 2016  -hx of prostate cancer  -will hold anticoagulation for 4 weeks; Will talk to H/O on whether to use NOAC        High cholesterol      Atorvastatin 40 mg po qd        Essential hypertension    Possible etiology of ICH  Off cardene  Continue home meds        Prostate cancer s/p treatment, currently under PSA surveillance    -Risk factor for increased  clotting  -follow up with urologist and oncologist  -history of PE 2012  -history of DVT 2016  -recommend holding xarelto for at least 1 week due to ICH            Neurologic Chief Complaint: L Cerebellar ICH    Subjective:     Interval History: Patient is seen for follow-up neurological assessment and treatment recommendations: MASOODO. Endorses dizziness. Neuro exam unchanged with continued LUE ataxia.     HPI, Past Medical, Family, and Social History remains the same as documented in the initial encounter.     Review of Systems   Constitutional: Positive for fatigue. Negative for fever.   HENT: Negative for rhinorrhea and sore throat.    Respiratory: Negative for cough and shortness of breath.    Gastrointestinal: Negative for abdominal pain and nausea.   Genitourinary: Negative for dysuria and frequency.   Musculoskeletal: Negative for arthralgias and myalgias.   Neurological: Positive for dizziness. Negative for weakness and headaches.   Psychiatric/Behavioral: Negative for agitation and confusion.     Scheduled Meds:   amlodipine  10 mg Oral Daily    atorvastatin  40 mg Oral QHS    hydrALAZINE  75 mg Oral Q8H    levetiracetam  500 mg Oral BID    lisinopril  40 mg Oral Daily    polyethylene glycol  17 g Oral Daily    senna-docusate 8.6-50 mg  1 tablet Oral BID    sodium chloride 0.9%  3 mL Intravenous Q8H     Continuous Infusions:   PRN Meds:acetaminophen, hydrALAZINE, labetalol, magnesium oxide, magnesium oxide, ondansetron, potassium chloride 10%, potassium chloride 10%, potassium chloride 10%, potassium, sodium phosphates, potassium, sodium phosphates, potassium, sodium phosphates    Objective:     Vital Signs (Most Recent):  Temp: 97.8 °F (36.6 °C) (10/01/17 0800)  Pulse: 77 (10/01/17 1049)  Resp: 18 (10/01/17 0800)  BP: 129/72 (10/01/17 0800)  SpO2: 97 % (10/01/17 0800)  BP Location: Left arm    Vital Signs Range (Last 24H):  Temp:  [97.8 °F (36.6 °C)-99.4 °F (37.4 °C)]   Pulse:  [71-99]   Resp:   [17-19]   BP: (105-175)/(55-80)   SpO2:  [89 %-98 %]   BP Location: Left arm    Physical Exam   Constitutional: He is oriented to person, place, and time. He appears well-developed and well-nourished. No distress.   HENT:   Head: Normocephalic and atraumatic.   Eyes: Conjunctivae and EOM are normal.   Neck: Normal range of motion. No tracheal deviation present.   Pulmonary/Chest: Effort normal. No respiratory distress.   Musculoskeletal: Normal range of motion. He exhibits no tenderness.   Neurological: He is alert and oriented to person, place, and time. No sensory deficit. Coordination abnormal.   Skin: Skin is warm and dry. No rash noted.   Psychiatric: He has a normal mood and affect. His behavior is normal. Judgment and thought content normal.       Neurological Exam:   LOC: alert and follows requests  Language: No aphasia  Speech: Dysarthria  Orientation: Person, Place, Time  Visual Fields (CN II): Hemianopsia  right  EOM (CN III, IV, VI): Full/intact  Facial Sensation (CN V): Symmetric  Facial Movement (CN VII): symmetric facial expression  Tongue (CN XII): to midline  Motor*: Arm Left:  Normal (5/5), Leg Left:   Normal (5/5), Arm Right:   Normal (5/5), Leg Right:   Normal (5/5)  Cerebellar*: Finger/Nose Abnormal - BUE  Sensation: intact to light touch      NIH Stroke Scale:  Interval: 7 days or at discharge (whichever comes first)  Level of Consciousness: 0 - alert  LOC Questions: 0 - answers both correctly  LOC Commands: 0 - performs both correctly  Best Gaze: 0 - normal  Visual: 1 - partial hemianopia  Facial Palsy: 1 - minor  Motor Left Arm: 1 - drift  Motor Right Arm: 0 - no drift  Motor Left Le - drift  Motor Right Le - no drift  Limb Ataxia: 2 - present in two limbs  Sensory: 0 - normal  Best Language: 0 - no aphasia  Dysarthria: 0 - normal articulation  Extinction and Inattention: 0 - no neglect      Laboratory:  CMP:   Recent Labs  Lab 10/01/17  0407   CALCIUM 9.0   ALBUMIN 3.0*   PROT 7.1       K 3.4*   CO2 23      BUN 11   CREATININE 0.9   ALKPHOS 91   ALT 14   AST 16   BILITOT 0.6     CBC:   Recent Labs  Lab 10/01/17  0407   WBC 5.75   RBC 4.93   HGB 14.1   HCT 43.5      MCV 88   MCH 28.6   MCHC 32.4       Diagnostic Results:  I have personally reviewed: CT Head., MRI Head., and US BLE  Findings:      CT Head. Date: 09/27/17  No significant change from prior.  Left cerebellar acute/recent intraparenchymal hemorrhage relatively stable. No evidence for extra-axial extension.  Slight mass effect on the fourth ventricle without hydrocephalus.  Superimposed Age-appropriate generalized cerebral volume loss with mild/moderate degree of patchy decreased attenuation supratentorial white matter suggestive for chronic microvascular ischemic change similarly seen on the prior.   Remote left parasagittal parietal/occipital infarct unchanged with stable bilateral thalamic remote lacunar type infarcts.     MRI Head. 9/28/17  1.  Acute parenchymal hemorrhage involving the paramedian superior left cerebellum with adjacent quan-hemorrhage edema and localized mass effect on the fourth ventricle without evidence of hydrocephalus.  Allowing for intrinsic T1 hyperintensity, no definite abnormal enhancement is identified within this region.  Further evaluation with repeat contrast-enhanced MRI examination can be performed following resolution of acute hemorrhage as clinically warranted.  2.  Findings suggestive of significant chronic microvascular ischemic change, remote infarction of the paramedian left occipital lobe as well as multiple remote lacunar type infarcts in the basal ganglia, thalami and sebastian.  Few small foci of increased diffusivity within the corona radiata without corresponding hypointense signal on ADC maps likely reflecting sequela of prior infarction.     US BLE. 9/27/17  No evidence of deep venous thrombosis bilaterally.  Resolution of prior left popliteal to peroneal  thrombus.     Echo. 9/29/17    1 - Concentric remodeling.     2 - Normal left ventricular systolic function (EF 60-65%).     3 - Normal right ventricular systolic function .     4 - Normal left ventricular diastolic function.     5 - No wall motion abnormalities.            Lucas Walker MD  Comprehensive Stroke Center  Department of Vascular Neurology   [facility]

## 2017-10-01 NOTE — PLAN OF CARE
Problem: Patient Care Overview  Goal: Individualization & Mutuality  Admit Date: 9/27/17    Admit Dx: ICH    Past Medical History:  No date: Allergy  No date: Colon polyp      Comment: benign  No date: Elevated PSA  No date: Glaucoma  No date: High cholesterol  No date: History of blood clots      Comment: lungs  No date: Hypertension  No date: Kidney stone  No date: Prostate cancer    Past Surgical History:  No date: COLON SURGERY  No date: CYSTOSCOPY  No date: KIDNEY STONE SURGERY  No date: LITHOTRIPSY  No date: NECK SURGERY  No date: PROSTATE SURGERY    Individualization:   1. Patient likes to be covered with multiple blankets.    Restraints: N/A   Outcome: Ongoing (interventions implemented as appropriate)  POC reviewed with patient and family. Pt has remained free from falls/injury tonight. No new skin impairments noted. Afebrile. HERMAN/speech clear. Education reinforced on cardiac diet. Safety precautions maintained. Continuous telemetry monitoring in progress. Independently with bed mobility.

## 2017-10-01 NOTE — PLAN OF CARE
Problem: Patient Care Overview  Goal: Plan of Care Review  Pt. AA&Ox4, sitting up in bed, with nad noted.  Poc reviewed including safety measures to reduce risk of falls, frequent repositioning to reduce risk of pressure ulcer development, and management of hypertension to reduce risk of stroke.  Pt. Verbalized understanding.  Will cont. To monitor.

## 2017-10-01 NOTE — ASSESSMENT & PLAN NOTE
68 yo M with PMHx HTN, HLD, PEs on rivaroxaban, and prostate cancer [place] [facility] [place] after sudden onset of headache, vertigo, and n/v approx 10-11 pm 09/26/17. CT head showing 2.6 cm L cerebellar hemorrhage.     Antiplatelets: none, ICH, hold anticoagulation 4 weeks; Will talk to hematology oncology on whether NOAC is suitable for this patient in the setting of previous DVT with hx of prostate cancer and potential hypercoagulable state.  Statins: atorvastatin 40  SBP <160  DVT ppx: SCDs  PT/OT and speech recommending IP Rehab  Neuro checks q4h

## 2017-10-01 NOTE — SUBJECTIVE & OBJECTIVE
Neurologic Chief Complaint: L Cerebellar ICH    Subjective:     Interval History: Patient is seen for follow-up neurological assessment and treatment recommendations: NAEO. Endorses dizziness. Neuro exam unchanged with continued LUE ataxia.     HPI, Past Medical, Family, and Social History remains the same as documented in the initial encounter.     Review of Systems   Constitutional: Positive for fatigue. Negative for fever.   HENT: Negative for rhinorrhea and sore throat.    Respiratory: Negative for cough and shortness of breath.    Gastrointestinal: Negative for abdominal pain and nausea.   Genitourinary: Negative for dysuria and frequency.   Musculoskeletal: Negative for arthralgias and myalgias.   Neurological: Positive for dizziness. Negative for weakness and headaches.   Psychiatric/Behavioral: Negative for agitation and confusion.     Scheduled Meds:   amlodipine  10 mg Oral Daily    atorvastatin  40 mg Oral QHS    hydrALAZINE  75 mg Oral Q8H    levetiracetam  500 mg Oral BID    lisinopril  40 mg Oral Daily    polyethylene glycol  17 g Oral Daily    senna-docusate 8.6-50 mg  1 tablet Oral BID    sodium chloride 0.9%  3 mL Intravenous Q8H     Continuous Infusions:   PRN Meds:acetaminophen, hydrALAZINE, labetalol, magnesium oxide, magnesium oxide, ondansetron, potassium chloride 10%, potassium chloride 10%, potassium chloride 10%, potassium, sodium phosphates, potassium, sodium phosphates, potassium, sodium phosphates    Objective:     Vital Signs (Most Recent):  Temp: 97.8 °F (36.6 °C) (10/01/17 0800)  Pulse: 77 (10/01/17 1049)  Resp: 18 (10/01/17 0800)  BP: 129/72 (10/01/17 0800)  SpO2: 97 % (10/01/17 0800)  BP Location: Left arm    Vital Signs Range (Last 24H):  Temp:  [97.8 °F (36.6 °C)-99.4 °F (37.4 °C)]   Pulse:  [71-99]   Resp:  [17-19]   BP: (105-175)/(55-80)   SpO2:  [89 %-98 %]   BP Location: Left arm    Physical Exam   Constitutional: He is oriented to person, place, and time. He appears  well-developed and well-nourished. No distress.   HENT:   Head: Normocephalic and atraumatic.   Eyes: Conjunctivae and EOM are normal.   Neck: Normal range of motion. No tracheal deviation present.   Pulmonary/Chest: Effort normal. No respiratory distress.   Musculoskeletal: Normal range of motion. He exhibits no tenderness.   Neurological: He is alert and oriented to person, place, and time. No sensory deficit. Coordination abnormal.   Skin: Skin is warm and dry. No rash noted.   Psychiatric: He has a normal mood and affect. His behavior is normal. Judgment and thought content normal.       Neurological Exam:   LOC: alert and follows requests  Language: No aphasia  Speech: Dysarthria  Orientation: Person, Place, Time  Visual Fields (CN II): Hemianopsia  right  EOM (CN III, IV, VI): Full/intact  Facial Sensation (CN V): Symmetric  Facial Movement (CN VII): symmetric facial expression  Tongue (CN XII): to midline  Motor*: Arm Left:  Normal (5/5), Leg Left:   Normal (5/5), Arm Right:   Normal (5/5), Leg Right:   Normal (5/5)  Cerebellar*: Finger/Nose Abnormal - BUE  Sensation: intact to light touch      NIH Stroke Scale:  Interval: 7 days or at discharge (whichever comes first)  Level of Consciousness: 0 - alert  LOC Questions: 0 - answers both correctly  LOC Commands: 0 - performs both correctly  Best Gaze: 0 - normal  Visual: 1 - partial hemianopia  Facial Palsy: 1 - minor  Motor Left Arm: 1 - drift  Motor Right Arm: 0 - no drift  Motor Left Le - drift  Motor Right Le - no drift  Limb Ataxia: 2 - present in two limbs  Sensory: 0 - normal  Best Language: 0 - no aphasia  Dysarthria: 0 - normal articulation  Extinction and Inattention: 0 - no neglect      Laboratory:  CMP:   Recent Labs  Lab 10/01/17  0407   CALCIUM 9.0   ALBUMIN 3.0*   PROT 7.1      K 3.4*   CO2 23      BUN 11   CREATININE 0.9   ALKPHOS 91   ALT 14   AST 16   BILITOT 0.6     CBC:   Recent Labs  Lab 10/01/17  0407   WBC 5.75   RBC  4.93   HGB 14.1   HCT 43.5      MCV 88   MCH 28.6   MCHC 32.4       Diagnostic Results:  I have personally reviewed: CT Head., MRI Head., and US BLE  Findings:      CT Head. Date: 09/27/17  No significant change from prior.  Left cerebellar acute/recent intraparenchymal hemorrhage relatively stable. No evidence for extra-axial extension.  Slight mass effect on the fourth ventricle without hydrocephalus.  Superimposed Age-appropriate generalized cerebral volume loss with mild/moderate degree of patchy decreased attenuation supratentorial white matter suggestive for chronic microvascular ischemic change similarly seen on the prior.   Remote left parasagittal parietal/occipital infarct unchanged with stable bilateral thalamic remote lacunar type infarcts.     MRI Head. 9/28/17  1.  Acute parenchymal hemorrhage involving the paramedian superior left cerebellum with adjacent quan-hemorrhage edema and localized mass effect on the fourth ventricle without evidence of hydrocephalus.  Allowing for intrinsic T1 hyperintensity, no definite abnormal enhancement is identified within this region.  Further evaluation with repeat contrast-enhanced MRI examination can be performed following resolution of acute hemorrhage as clinically warranted.  2.  Findings suggestive of significant chronic microvascular ischemic change, remote infarction of the paramedian left occipital lobe as well as multiple remote lacunar type infarcts in the basal ganglia, thalami and sebastian.  Few small foci of increased diffusivity within the corona radiata without corresponding hypointense signal on ADC maps likely reflecting sequela of prior infarction.     US BLE. 9/27/17  No evidence of deep venous thrombosis bilaterally.  Resolution of prior left popliteal to peroneal thrombus.     Echo. 9/29/17    1 - Concentric remodeling.     2 - Normal left ventricular systolic function (EF 60-65%).     3 - Normal right ventricular systolic function .     4 -  Normal left ventricular diastolic function.     5 - No wall motion abnormalities.

## 2017-10-02 LAB
ALBUMIN SERPL BCP-MCNC: 3 G/DL
ALP SERPL-CCNC: 81 U/L
ALT SERPL W/O P-5'-P-CCNC: 16 U/L
ANION GAP SERPL CALC-SCNC: 7 MMOL/L
AST SERPL-CCNC: 17 U/L
BASOPHILS # BLD AUTO: 0.05 K/UL
BASOPHILS NFR BLD: 1.1 %
BILIRUB SERPL-MCNC: 0.8 MG/DL
BUN SERPL-MCNC: 10 MG/DL
CALCIUM SERPL-MCNC: 9.2 MG/DL
CHLORIDE SERPL-SCNC: 107 MMOL/L
CO2 SERPL-SCNC: 24 MMOL/L
CREAT SERPL-MCNC: 0.9 MG/DL
DIFFERENTIAL METHOD: ABNORMAL
EOSINOPHIL # BLD AUTO: 0.1 K/UL
EOSINOPHIL NFR BLD: 2 %
ERYTHROCYTE [DISTWIDTH] IN BLOOD BY AUTOMATED COUNT: 13.6 %
EST. GFR  (AFRICAN AMERICAN): >60 ML/MIN/1.73 M^2
EST. GFR  (NON AFRICAN AMERICAN): >60 ML/MIN/1.73 M^2
GLUCOSE SERPL-MCNC: 87 MG/DL
HCT VFR BLD AUTO: 42 %
HGB BLD-MCNC: 13.7 G/DL
LYMPHOCYTES # BLD AUTO: 2.1 K/UL
LYMPHOCYTES NFR BLD: 45.5 %
MAGNESIUM SERPL-MCNC: 1.9 MG/DL
MCH RBC QN AUTO: 28.6 PG
MCHC RBC AUTO-ENTMCNC: 32.6 G/DL
MCV RBC AUTO: 88 FL
MONOCYTES # BLD AUTO: 0.6 K/UL
MONOCYTES NFR BLD: 12.9 %
NEUTROPHILS # BLD AUTO: 1.7 K/UL
NEUTROPHILS NFR BLD: 38.5 %
PHOSPHATE SERPL-MCNC: 3.7 MG/DL
PLATELET # BLD AUTO: 208 K/UL
PMV BLD AUTO: 11.9 FL
POTASSIUM SERPL-SCNC: 3.5 MMOL/L
PROT SERPL-MCNC: 7 G/DL
RBC # BLD AUTO: 4.79 M/UL
SODIUM SERPL-SCNC: 138 MMOL/L
WBC # BLD AUTO: 4.51 K/UL

## 2017-10-02 PROCEDURE — 97535 SELF CARE MNGMENT TRAINING: CPT

## 2017-10-02 PROCEDURE — 99232 SBSQ HOSP IP/OBS MODERATE 35: CPT | Mod: ,,, | Performed by: NURSE PRACTITIONER

## 2017-10-02 PROCEDURE — 92507 TX SP LANG VOICE COMM INDIV: CPT

## 2017-10-02 PROCEDURE — A4216 STERILE WATER/SALINE, 10 ML: HCPCS | Performed by: NURSE PRACTITIONER

## 2017-10-02 PROCEDURE — 97116 GAIT TRAINING THERAPY: CPT

## 2017-10-02 PROCEDURE — 97112 NEUROMUSCULAR REEDUCATION: CPT

## 2017-10-02 PROCEDURE — 25000003 PHARM REV CODE 250: Performed by: PHYSICIAN ASSISTANT

## 2017-10-02 PROCEDURE — 20600001 HC STEP DOWN PRIVATE ROOM

## 2017-10-02 PROCEDURE — 25000003 PHARM REV CODE 250: Performed by: NURSE PRACTITIONER

## 2017-10-02 PROCEDURE — 25000003 PHARM REV CODE 250: Performed by: STUDENT IN AN ORGANIZED HEALTH CARE EDUCATION/TRAINING PROGRAM

## 2017-10-02 PROCEDURE — 97110 THERAPEUTIC EXERCISES: CPT

## 2017-10-02 PROCEDURE — 84100 ASSAY OF PHOSPHORUS: CPT

## 2017-10-02 PROCEDURE — 99233 SBSQ HOSP IP/OBS HIGH 50: CPT | Mod: GC,,, | Performed by: PSYCHIATRY & NEUROLOGY

## 2017-10-02 PROCEDURE — 80053 COMPREHEN METABOLIC PANEL: CPT

## 2017-10-02 PROCEDURE — 36415 COLL VENOUS BLD VENIPUNCTURE: CPT

## 2017-10-02 PROCEDURE — 83735 ASSAY OF MAGNESIUM: CPT

## 2017-10-02 PROCEDURE — 85025 COMPLETE CBC W/AUTO DIFF WBC: CPT

## 2017-10-02 RX ORDER — LEVETIRACETAM 500 MG/1
500 TABLET ORAL EVERY MORNING
Status: DISCONTINUED | OUTPATIENT
Start: 2017-10-03 | End: 2017-10-03 | Stop reason: HOSPADM

## 2017-10-02 RX ORDER — LEVETIRACETAM 250 MG/1
250 TABLET ORAL NIGHTLY
Status: DISCONTINUED | OUTPATIENT
Start: 2017-10-02 | End: 2017-10-03 | Stop reason: HOSPADM

## 2017-10-02 RX ORDER — LEVETIRACETAM 250 MG/1
250 TABLET ORAL 2 TIMES DAILY
Status: DISCONTINUED | OUTPATIENT
Start: 2017-10-02 | End: 2017-10-02

## 2017-10-02 RX ADMIN — HYDRALAZINE HYDROCHLORIDE 75 MG: 50 TABLET ORAL at 05:10

## 2017-10-02 RX ADMIN — AMLODIPINE BESYLATE 10 MG: 10 TABLET ORAL at 08:10

## 2017-10-02 RX ADMIN — STANDARDIZED SENNA CONCENTRATE AND DOCUSATE SODIUM 1 TABLET: 8.6; 5 TABLET, FILM COATED ORAL at 09:10

## 2017-10-02 RX ADMIN — LEVETIRACETAM 250 MG: 250 TABLET, FILM COATED ORAL at 09:10

## 2017-10-02 RX ADMIN — Medication 3 ML: at 09:10

## 2017-10-02 RX ADMIN — HYDRALAZINE HYDROCHLORIDE 75 MG: 50 TABLET ORAL at 01:10

## 2017-10-02 RX ADMIN — HYDRALAZINE HYDROCHLORIDE 75 MG: 50 TABLET ORAL at 09:10

## 2017-10-02 RX ADMIN — LEVETIRACETAM 500 MG: 500 TABLET ORAL at 08:10

## 2017-10-02 RX ADMIN — ATORVASTATIN CALCIUM 40 MG: 20 TABLET, FILM COATED ORAL at 09:10

## 2017-10-02 RX ADMIN — LISINOPRIL 40 MG: 20 TABLET ORAL at 08:10

## 2017-10-02 RX ADMIN — POLYETHYLENE GLYCOL 3350 17 G: 17 POWDER, FOR SOLUTION ORAL at 08:10

## 2017-10-02 RX ADMIN — Medication 3 ML: at 05:10

## 2017-10-02 RX ADMIN — STANDARDIZED SENNA CONCENTRATE AND DOCUSATE SODIUM 1 TABLET: 8.6; 5 TABLET, FILM COATED ORAL at 08:10

## 2017-10-02 NOTE — PLAN OF CARE
ORESTES spoke with Chantal with [facility] regarding pt's referral. Chantal states that pt looks appropriate for SNF in regards to therapy however she still needs to review his medical history and discuss it with physician. Chantal states that she is unable to accept patient today therefore pt will have to be accepted tomorrow pending acceptance to the facility and insurance authorization. ORESTES will continue to follow.    Sandie Earl, JAIRO  [facility]  Ext. [phone]

## 2017-10-02 NOTE — PT/OT/SLP PROGRESS
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  Pt required encouragement to participate with therapy 2/2 headache  Pain/Comfort  Pain Rating 1: did not rate-- headache ( Nursing notified)     Objective:  Patient found with: bed alarm, telemetry, peripheral IV     Functional Mobility:  Bed Mobility:  Rolling/Turning to Left: Supervision  Rolling/Turning Right: Supervision  Supine to Sit: Contact Guard Assistance  Sit to Supine:  (Pt left seated UIC)    Transfers:   Sit <> Stand Assistance: Minimum Assistance  Sit <> Stand Assistive Device: No Assistive Device  Bed <> Chair Technique: Stand Pivot (following functional mobility from EOB ~4-5 steps)  Bed <> Chair Transfer Assistance: Minimum Assistance  Bed <> Chair Assistive Device: No Assistive Device    Functional Ambulation: Pt performed multiple side and forwards steps requiring min A for dynamic balance.    Activities of Daily Living:     UE Dressing Level of Assistance: Minimum assistance (to mervat personal robe while seated EOB)    Balance:   Static Sit: GOOD-: Takes MODERATE challenges from all directions but inconsistently  Dynamic Sit: FAIR+: Maintains balance through MINIMAL excursions of active trunk motion  Static Stand: FAIR: Maintains without assist but unable to take challenges  Dynamic stand: POOR: Min A    Therapeutic Activities and Exercises:  - Pt edu on OT role/POC  - Edu on importance of OOB activity with staff  "assistance  - Safety during functional t/f and mobility   - Communication board updated  - BUE HEP provided to complete 2-3 x daily   - Pt completed functional t/f to chair --- assistance level noted above    Pt completed AROM BUE exercised 1x15 reps including: shoulder flexion, elbow flexion, chest press, shoulder shrugs, and scapular squeezes with good tolerance. Pt required min A to guide LUE 2/2 fatigue.     Additional:  AAOx4  Following 100% of simple one-step commands  Delayed responses    AM-PAC 6 CLICK ADL   How much help from another person does this patient currently need?   1 = Unable, Total/Dependent Assistance  2 = A lot, Maximum/Moderate Assistance  3 = A little, Minimum/Contact Guard/Supervision  4 = None, Modified [place]/Independent    Putting on and taking off regular lower body clothing? : 3  Bathing (including washing, rinsing, drying)?: 3  Toileting, which includes using toilet, bedpan, or urinal? : 3  Putting on and taking off regular upper body clothing?: 3  Taking care of personal grooming such as brushing teeth?: 3  Eating meals?: 3  Total Score: 18     AM-PAC Raw Score CMS "G-Code Modifier Level of Impairment Assistance   6 % Total / Unable   7 - 8 CM 80 - 100% Maximal Assist   9-13 CL 60 - 80% Moderate Assist   14 - 19 CK 40 - 60% Moderate Assist   20 - 22 CJ 20 - 40% Minimal Assist   23 CI 1-20% SBA / CGA   24 CH 0% Independent/ Mod I       Patient left up in chair with all lines intact, call button in reach and RN notified    ASSESSMENT:  Otis Salcido is a 67 y.o. 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 Therapy session limited 2/2 headache pain (nursing notified). Pt would benefit from continued skilled OT to address deficits listed below and maximize return to PLOF. OT recommending SNF following d/c to increase overall independence and safety with ADL.     Rehab identified problem " list/impairments: Rehab identified problem list/impairments: weakness, impaired endurance, impaired self care skills, impaired functional mobilty, decreased upper extremity function, decreased lower extremity function, impaired balance, decreased safety awareness, gait instability    Rehab potential is good.    Activity tolerance: Good    Discharge recommendations: Discharge Facility/Level Of Care Needs: nursing facility, skilled     Barriers to discharge: Barriers to Discharge: Inaccessible home environment, Decreased caregiver support    Equipment recommendations:  (TBD)     GOALS:    Occupational Therapy Goals        Problem: Occupational Therapy Goal    Goal Priority Disciplines Outcome Interventions   Occupational Therapy Goal     OT, PT/OT     Description:  Goals achieved for transfers, lower body dressing and toileting.  Revised goals set 9/30 to be addressed for 7 days with expiration date, 10/7:  Patient will increase functional independence with ADLs by performing:    Patient will demonstrate rolling to the right with modified independence.  Not met   Patient will demonstrate rolling to the left with modified independence.   Not met  Patient will demonstrate supine -sit with modified independence.   Not met  Patient will demonstrate stand pivot transfers with supervision.   Not met  Patient will demonstrate grooming while standing with modified independence.   Not met  Patient will demonstrate upper body dressing with supervision, including gathering clothing. Not met  Patient will demonstrate lower body dressing with supervision.   Not met  Patient will demonstrate toileting with supervision.   Not met  Patient's family / caregiver will demonstrate independence and safety with assisting patient with self-care skills and functional mobility.     Not met  Patient and/or patient's family will verbalize understanding of stroke prevention guidelines, personal risk factors and stroke warning signs via teachback  method.  Not met                            Plan:  Patient to be seen 6 x/week to address the above listed problems via self-care/home management, therapeutic activities, therapeutic exercises, neuromuscular re-education  Plan of Care expires: 10/26/17  Plan of Care reviewed with: patient         Marivel epstein, OT  10/02/2017

## 2017-10-02 NOTE — ASSESSMENT & PLAN NOTE
Risk factor for increased clotting  Follow-up with urologist and oncologist  Hx PE 2012  Hx DVT 2016  Recommend holding xarelto for at least 4 weeks due to ICH

## 2017-10-02 NOTE — PLAN OF CARE
ORESTES informed by Philly with [facility] that after pt's case review, the recommendation is for SNF. Informed by Philly that [facility] will approve for SNF.     ORESTES spoke with patient about the above. Patient voiced understanding and would like to go to [facility]. SW faxed referral to [facility] via [facility] for review. ORESTES left message for Chantal with [facility] for review. SW will continue to follow.    2:33 PM   ORESTES left message informing Yesy with [facility] that referral was sent to [facility] for review.    Sandie Earl, JAIRO  [facility]  Ext. [phone]

## 2017-10-02 NOTE — PLAN OF CARE
Problem: Patient Care Overview  Goal: Individualization & Mutuality  Admit Date: 9/27/17    Admit Dx: ICH    Past Medical History:  No date: Allergy  No date: Colon polyp      Comment: benign  No date: Elevated PSA  No date: Glaucoma  No date: High cholesterol  No date: History of blood clots      Comment: lungs  No date: Hypertension  No date: Kidney stone  No date: Prostate cancer    Past Surgical History:  No date: COLON SURGERY  No date: CYSTOSCOPY  No date: KIDNEY STONE SURGERY  No date: LITHOTRIPSY  No date: NECK SURGERY  No date: PROSTATE SURGERY    Individualization:   1. Patient likes to be covered with multiple blankets.    Restraints: N/A   Outcome: Ongoing (interventions implemented as appropriate)  POC reviewed with patient and family tonight. AAOx4. Afebrile. Responds appropriately and follows simple commands. No worsening neuro changes overnight.  No new skin impairments noted. Safety precautions maintained. Complaints of a mild headache overnight that was resolved with PRN Tylenol. Plans are for discharge to rehab today, otherwise pt rested well overnight.

## 2017-10-02 NOTE — PLAN OF CARE
10/02/17 1028   Discharge Reassessment   Assessment Type Discharge Planning Reassessment   Provided patient/caregiver education on the expected discharge date and the discharge plan Yes   Do you have any problems affording any of your prescribed medications? No   Discharge Plan A Rehab   Discharge Plan B Rehab   Can the patient answer the patient profile reliably? Yes, cognitively intact   How does the patient rate their overall health at the present time? Fair   Describe the patient's ability to walk at the present time. No restrictions   How often would a person be available to care for the patient? Often   Number of comorbid conditions (as recorded on the chart) Three   During the past month, has the patient often been bothered by feeling down, depressed or hopeless? Yes  (per Nursing)   During the past month, has the patient often been bothered by little interest or pleasure in doing things? Yes  (Per Nursing)       Patient to discharge to [facility] once insurance auth obtain and bed assigned.    Kristie Sanders RN, CCRN-K, [facility]  Neuro-Critical Care   [phone]

## 2017-10-02 NOTE — ASSESSMENT & PLAN NOTE
PE in 2012  DVT in 2016  Hx of prostate cancer  Will hold anticoagulation for 4 weeks; Will F/U with Heme/Onc OP on whether or not to use NOAC

## 2017-10-02 NOTE — SUBJECTIVE & OBJECTIVE
Neurologic Chief Complaint: L Cerebellar ICH    Subjective:     Interval History: Patient is seen for follow-up neurological assessment and treatment recommendations: NAEON, Dizziness improved. Possible d/c today pending insurance approval    HPI, Past Medical, Family, and Social History remains the same as documented in the initial encounter.     Review of Systems   Constitutional: Negative for diaphoresis and fever.   HENT: Negative for rhinorrhea and sneezing.    Eyes: Negative for discharge and redness.   Respiratory: Negative for cough and shortness of breath.    Cardiovascular: Negative for chest pain and leg swelling.   Gastrointestinal: Negative for abdominal pain and nausea.   Genitourinary: Negative for frequency and hematuria.   Musculoskeletal: Negative for arthralgias and back pain.   Skin: Negative for pallor and rash.   Neurological: Negative for dizziness and headaches.   Psychiatric/Behavioral: Negative for confusion. The patient is not nervous/anxious.      Scheduled Meds:   amlodipine  10 mg Oral Daily    atorvastatin  40 mg Oral QHS    hydrALAZINE  75 mg Oral Q8H    levetiracetam  500 mg Oral BID    lisinopril  40 mg Oral Daily    polyethylene glycol  17 g Oral Daily    senna-docusate 8.6-50 mg  1 tablet Oral BID    sodium chloride 0.9%  3 mL Intravenous Q8H     Continuous Infusions:   PRN Meds:acetaminophen, hydrALAZINE, labetalol, magnesium oxide, magnesium oxide, ondansetron, potassium chloride 10%, potassium chloride 10%, potassium chloride 10%, potassium, sodium phosphates, potassium, sodium phosphates, potassium, sodium phosphates    Objective:     Vital Signs (Most Recent):  Temp: 98.3 °F (36.8 °C) (10/02/17 1104)  Pulse: (!) 124 (10/02/17 1147)  Resp: 18 (10/02/17 1104)  BP: (!) 104/52 (10/02/17 1104)  SpO2: (!) 94 % (10/02/17 1104)  BP Location: Left arm    Vital Signs Range (Last 24H):  Temp:  [97.3 °F (36.3 °C)-98.6 °F (37 °C)]   Pulse:  []   Resp:  [18-20]   BP:  (104-128)/(52-84)   SpO2:  [92 %-97 %]   BP Location: Left arm    Physical Exam   Constitutional: He is oriented to person, place, and time. He appears well-developed and well-nourished. No distress.   HENT:   Head: Normocephalic and atraumatic.   Eyes: Conjunctivae and EOM are normal.   Neck: Normal range of motion.   Pulmonary/Chest: Effort normal. No stridor. No respiratory distress.   Abdominal: He exhibits no distension. There is no guarding.   Musculoskeletal: Normal range of motion. He exhibits no deformity.   Neurological: He is alert and oriented to person, place, and time. No sensory deficit. Coordination abnormal.   Skin: Skin is warm and dry.   Psychiatric: He has a normal mood and affect. His behavior is normal. Judgment and thought content normal.       Neurological Exam:   LOC: alert and follows requests  Language: No aphasia  Speech: No dysarthria  Orientation: Person, Place, Time  Visual Fields (CN II): Full  EOM (CN III, IV, VI): Full/intact  Tongue (CN XII): to midline  Motor*: Arm Left:  Normal (5/5), Leg Left:   Normal (5/5), Arm Right:   Normal (5/5), Leg Right:   Normal (5/5)  Cerebellar*: Finger/Nose Abnormal - left upper, Heel/Shin Abnormal - left lower  Sensation: intact to light touch  Tone: Arm-Left: normal; Leg-Left: normal; Arm-Right: normal; Leg-Right: normal    NIH Stroke Scale:    Level of Consciousness: 0 - alert  LOC Questions: 0 - answers both correctly  LOC Commands: 0 - performs both correctly  Best Gaze: 0 - normal  Visual: 0 - no visual loss  Facial Palsy: 0 - normal  Motor Left Arm: 0 - no drift  Motor Right Arm: 0 - no drift  Motor Left Le - no drift  Motor Right Le - no drift  Limb Ataxia: 2 - present in two limbs  Sensory: 0 - normal  Best Language: 0 - no aphasia  Dysarthria: 0 - normal articulation  Extinction and Inattention: 0 - no neglect  NIH Stroke Scale Total: 2      Laboratory:  CMP:   Recent Labs  Lab 10/02/17  0431   CALCIUM 9.2   ALBUMIN 3.0*   PROT 7.0       K 3.5   CO2 24      BUN 10   CREATININE 0.9   ALKPHOS 81   ALT 16   AST 17   BILITOT 0.8     CBC:   Recent Labs  Lab 10/02/17  0431   WBC 4.51   RBC 4.79   HGB 13.7*   HCT 42.0      MCV 88   MCH 28.6   MCHC 32.6     Lipid Panel:   Recent Labs  Lab 09/28/17  0208   CHOL 177   LDLCALC 122.2   HDL 41   TRIG 69     Coagulation:   Recent Labs  Lab 09/28/17  0208   INR 1.0   APTT 24.0     Hgb A1C:   Recent Labs  Lab 09/28/17  0208   HGBA1C 4.9     TSH:   Recent Labs  Lab 09/28/17  0208   TSH 0.278*       Diagnostic Results:  I have personally reviewed:    CT Head. Date: 09/27/17  No significant change from prior.  Left cerebellar acute/recent intraparenchymal hemorrhage relatively stable. No evidence for extra-axial extension.  Slight mass effect on the fourth ventricle without hydrocephalus.  Superimposed Age-appropriate generalized cerebral volume loss with mild/moderate degree of patchy decreased attenuation supratentorial white matter suggestive for chronic microvascular ischemic change similarly seen on the prior.   Remote left parasagittal parietal/occipital infarct unchanged with stable bilateral thalamic remote lacunar type infarcts.     MRI Head. 9/28/17  1.  Acute parenchymal hemorrhage involving the paramedian superior left cerebellum with adjacent quan-hemorrhage edema and localized mass effect on the fourth ventricle without evidence of hydrocephalus.  Allowing for intrinsic T1 hyperintensity, no definite abnormal enhancement is identified within this region.  Further evaluation with repeat contrast-enhanced MRI examination can be performed following resolution of acute hemorrhage as clinically warranted.  2.  Findings suggestive of significant chronic microvascular ischemic change, remote infarction of the paramedian left occipital lobe as well as multiple remote lacunar type infarcts in the basal ganglia, thalami and sebastian.  Few small foci of increased diffusivity within the corona  radiata without corresponding hypointense signal on ADC maps likely reflecting sequela of prior infarction.     US BLE. 9/27/17  No evidence of deep venous thrombosis bilaterally.  Resolution of prior left popliteal to peroneal thrombus.     Echo. 9/29/17    1 - Concentric remodeling.     2 - Normal left ventricular systolic function (EF 60-65%).     3 - Normal right ventricular systolic function .     4 - Normal left ventricular diastolic function.     5 - No wall motion abnormalities.

## 2017-10-02 NOTE — PLAN OF CARE
Problem: Physical Therapy Goal  Goal: Physical Therapy Goal  PT goals until 10/6/17     1. Pt supine to sit with CGA-not met  2. Pt sit to supine with CGA-not met  3. Pt sit to stand with or without RW as needed for safety with CGA-not met  4. Pt to perform gait 100ft with or without RW as needed for safety with CGA.-not met  5. Pt to transfer bed to/from bedside chair with CGA.-not met  6. Pt to perform B LE exs in sitting x 20 reps with handout.-not met           Goals remain appropriate.     Meera Campbell, PTA.  10/2/2017

## 2017-10-02 NOTE — ASSESSMENT & PLAN NOTE
-  Risk factor for increased clotting  -  H/o PE 2012, DVT 2016  -  Heme/onc to determine long-term anticoagulation (consult? Outpatient?)  -  Holding Xarelto at least 1 week for ICH

## 2017-10-02 NOTE — PROGRESS NOTES
[facility]  Vascular Neurology  Comprehensive Stroke Center  Progress Note    Assessment/Plan:     66 yo M with PMHx of HTN, HLD, previous PEs on rivaroxaban presents to [facility] ED 09/27/17 with sudden onset of headache, vertigo, and nausea with multiple episodes of emesis.  CT head showing ICH approx 2.6 cm.  No falls despite vertigo.  No previous history of stroke.  Symptoms are persistent but have not worsened since onset.  Now on nicardipine gtt for BP control.    9/28/17 patient neurologically stable, off cardene, MRI pending, plans for stepdown  9/29 Pt neurologically stable. Plans for stepdown. Therapy recommending inpatient rehab.  10/2 Dizziness resolved per pt. Still with LUE ataxia. PT recommends rehab, pt's insurance approving SNF. Possible d/c today    * Left-sided nontraumatic intracerebral hemorrhage of cerebellum    66 yo M with PMHx HTN, HLD, PEs on rivaroxaban, and prostate cancer presents to [facility] ED 09/27/17 after sudden onset of headache, vertigo, and n/v approx 10-11 pm 09/26/17. CT head showing 2.6 cm L cerebellar hemorrhage.     Antiplatelets: none, ICH, hold anticoagulation 4 weeks; Spoke with Heme/Onc on whether NOAC is suitable in setting of previous DVT with hx of prostate cancer and potential hypercoagulable state. They will follow outpatient.  Statins: Atorvastatin 40  SBP <160  DVT ppx: SCDs  PT/OT and speech recommending IP Rehab; Insurance approving SNF  Neuro checks q4h        Vasogenic cerebral edema    2/2 infarct  MRI showing edema with mass effect in the 4th ventricle. No hydrocephalus         History of blood clots    PE in 2012  DVT in 2016  Hx of prostate cancer  Will hold anticoagulation for 4 weeks; Will F/U with Heme/Onc OP on whether or not to use NOAC        High cholesterol      Atorvastatin 40 mg po qd        Essential hypertension    Possible etiology of ICH  Continue home meds        History of deep venous thrombosis (DVT) of distal vein of left  lower extremity 11/2016    US BLE negative        Prostate cancer s/p treatment, currently under PSA surveillance    Risk factor for increased clotting  Follow-up with urologist and oncologist  Hx PE 2012  Hx DVT 2016  Recommend holding xarelto for at least 4 weeks due to ICH            Neurologic Chief Complaint: L Cerebellar ICH    Subjective:     Interval History: Patient is seen for follow-up neurological assessment and treatment recommendations: NAEON, Dizziness improved. Possible d/c today pending insurance approval    HPI, Past Medical, Family, and Social History remains the same as documented in the initial encounter.     Review of Systems   Constitutional: Negative for diaphoresis and fever.   HENT: Negative for rhinorrhea and sneezing.    Eyes: Negative for discharge and redness.   Respiratory: Negative for cough and shortness of breath.    Cardiovascular: Negative for chest pain and leg swelling.   Gastrointestinal: Negative for abdominal pain and nausea.   Genitourinary: Negative for frequency and hematuria.   Musculoskeletal: Negative for arthralgias and back pain.   Skin: Negative for pallor and rash.   Neurological: Negative for dizziness and headaches.   Psychiatric/Behavioral: Negative for confusion. The patient is not nervous/anxious.      Scheduled Meds:   amlodipine  10 mg Oral Daily    atorvastatin  40 mg Oral QHS    hydrALAZINE  75 mg Oral Q8H    levetiracetam  500 mg Oral BID    lisinopril  40 mg Oral Daily    polyethylene glycol  17 g Oral Daily    senna-docusate 8.6-50 mg  1 tablet Oral BID    sodium chloride 0.9%  3 mL Intravenous Q8H     Continuous Infusions:   PRN Meds:acetaminophen, hydrALAZINE, labetalol, magnesium oxide, magnesium oxide, ondansetron, potassium chloride 10%, potassium chloride 10%, potassium chloride 10%, potassium, sodium phosphates, potassium, sodium phosphates, potassium, sodium phosphates    Objective:     Vital Signs (Most Recent):  Temp: 98.3 °F (36.8 °C)  (10/02/17 1104)  Pulse: (!) 124 (10/02/17 1147)  Resp: 18 (10/02/17 1104)  BP: (!) 104/52 (10/02/17 1104)  SpO2: (!) 94 % (10/02/17 1104)  BP Location: Left arm    Vital Signs Range (Last 24H):  Temp:  [97.3 °F (36.3 °C)-98.6 °F (37 °C)]   Pulse:  []   Resp:  [18-20]   BP: (104-128)/(52-84)   SpO2:  [92 %-97 %]   BP Location: Left arm    Physical Exam   Constitutional: He is oriented to person, place, and time. He appears well-developed and well-nourished. No distress.   HENT:   Head: Normocephalic and atraumatic.   Eyes: Conjunctivae and EOM are normal.   Neck: Normal range of motion.   Pulmonary/Chest: Effort normal. No stridor. No respiratory distress.   Abdominal: He exhibits no distension. There is no guarding.   Musculoskeletal: Normal range of motion. He exhibits no deformity.   Neurological: He is alert and oriented to person, place, and time. No sensory deficit. Coordination abnormal.   Skin: Skin is warm and dry.   Psychiatric: He has a normal mood and affect. His behavior is normal. Judgment and thought content normal.       Neurological Exam:   LOC: alert and follows requests  Language: No aphasia  Speech: No dysarthria  Orientation: Person, Place, Time  Visual Fields (CN II): Full  EOM (CN III, IV, VI): Full/intact  Tongue (CN XII): to midline  Motor*: Arm Left:  Normal (5/5), Leg Left:   Normal (5/5), Arm Right:   Normal (5/5), Leg Right:   Normal (5/5)  Cerebellar*: Finger/Nose Abnormal - left upper, Heel/Shin Abnormal - left lower  Sensation: intact to light touch  Tone: Arm-Left: normal; Leg-Left: normal; Arm-Right: normal; Leg-Right: normal    NIH Stroke Scale:    Level of Consciousness: 0 - alert  LOC Questions: 0 - answers both correctly  LOC Commands: 0 - performs both correctly  Best Gaze: 0 - normal  Visual: 0 - no visual loss  Facial Palsy: 0 - normal  Motor Left Arm: 0 - no drift  Motor Right Arm: 0 - no drift  Motor Left Le - no drift  Motor Right Le - no drift  Limb Ataxia: 2  - present in two limbs  Sensory: 0 - normal  Best Language: 0 - no aphasia  Dysarthria: 0 - normal articulation  Extinction and Inattention: 0 - no neglect  NIH Stroke Scale Total: 2      Laboratory:  CMP:   Recent Labs  Lab 10/02/17  0431   CALCIUM 9.2   ALBUMIN 3.0*   PROT 7.0      K 3.5   CO2 24      BUN 10   CREATININE 0.9   ALKPHOS 81   ALT 16   AST 17   BILITOT 0.8     CBC:   Recent Labs  Lab 10/02/17  0431   WBC 4.51   RBC 4.79   HGB 13.7*   HCT 42.0      MCV 88   MCH 28.6   MCHC 32.6     Lipid Panel:   Recent Labs  Lab 09/28/17 0208   CHOL 177   LDLCALC 122.2   HDL 41   TRIG 69     Coagulation:   Recent Labs  Lab 09/28/17 0208   INR 1.0   APTT 24.0     Hgb A1C:   Recent Labs  Lab 09/28/17 0208   HGBA1C 4.9     TSH:   Recent Labs  Lab 09/28/17 0208   TSH 0.278*       Diagnostic Results:  I have personally reviewed:    CT Head. Date: 09/27/17  No significant change from prior.  Left cerebellar acute/recent intraparenchymal hemorrhage relatively stable. No evidence for extra-axial extension.  Slight mass effect on the fourth ventricle without hydrocephalus.  Superimposed Age-appropriate generalized cerebral volume loss with mild/moderate degree of patchy decreased attenuation supratentorial white matter suggestive for chronic microvascular ischemic change similarly seen on the prior.   Remote left parasagittal parietal/occipital infarct unchanged with stable bilateral thalamic remote lacunar type infarcts.     MRI Head. 9/28/17  1.  Acute parenchymal hemorrhage involving the paramedian superior left cerebellum with adjacent quan-hemorrhage edema and localized mass effect on the fourth ventricle without evidence of hydrocephalus.  Allowing for intrinsic T1 hyperintensity, no definite abnormal enhancement is identified within this region.  Further evaluation with repeat contrast-enhanced MRI examination can be performed following resolution of acute hemorrhage as clinically warranted.  2.   Findings suggestive of significant chronic microvascular ischemic change, remote infarction of the paramedian left occipital lobe as well as multiple remote lacunar type infarcts in the basal ganglia, thalami and sebastian.  Few small foci of increased diffusivity within the corona radiata without corresponding hypointense signal on ADC maps likely reflecting sequela of prior infarction.     US BLE. 9/27/17  No evidence of deep venous thrombosis bilaterally.  Resolution of prior left popliteal to peroneal thrombus.     Echo. 9/29/17    1 - Concentric remodeling.     2 - Normal left ventricular systolic function (EF 60-65%).     3 - Normal right ventricular systolic function .     4 - Normal left ventricular diastolic function.     5 - No wall motion abnormalities.       Amber Gomez PA-C  Comprehensive Stroke Center  Department of Vascular Neurology   [facility]

## 2017-10-02 NOTE — SUBJECTIVE & OBJECTIVE
Interval History: (10/02/2017) Patient is seen for follow-up rehab evaluation and recommendations.  No acute events over night.  Stepped down to floor over the weekend.  Doing well with therapy.  Barriers for discharge/rehab admission: Insurance approval pending for rehab admission.      HPI, Past Medical, Surgical, Family, and Social History remains the same as documented in the initial encounter.    Scheduled Medications:    amlodipine  10 mg Oral Daily    atorvastatin  40 mg Oral QHS    hydrALAZINE  75 mg Oral Q8H    levetiracetam  500 mg Oral BID    lisinopril  40 mg Oral Daily    polyethylene glycol  17 g Oral Daily    senna-docusate 8.6-50 mg  1 tablet Oral BID    sodium chloride 0.9%  3 mL Intravenous Q8H       PRN Medications: acetaminophen, hydrALAZINE, labetalol, magnesium oxide, magnesium oxide, ondansetron, potassium chloride 10%, potassium chloride 10%, potassium chloride 10%, potassium, sodium phosphates, potassium, sodium phosphates, potassium, sodium phosphates    Review of Systems   Constitutional: Negative for chills, fatigue and fever.   HENT: Negative for drooling, hearing loss, trouble swallowing and voice change.    Eyes: Negative for pain and visual disturbance.   Respiratory: Negative for cough, shortness of breath and wheezing.    Cardiovascular: Negative for chest pain and palpitations.   Gastrointestinal: Negative for abdominal pain, nausea and vomiting.   Genitourinary: Negative for difficulty urinating and flank pain.   Musculoskeletal: Positive for gait problem and myalgias. Negative for arthralgias, back pain and neck pain.   Skin: Negative for rash and wound.   Neurological: Positive for weakness. Negative for dizziness, numbness and headaches.   Psychiatric/Behavioral: Negative for agitation and hallucinations. The patient is not nervous/anxious.      Objective:     Vital Signs (Most Recent):  Temp: 98.3 °F (36.8 °C) (10/02/17 1104)  Pulse: 97 (10/02/17 1104)  Resp: 18  (10/02/17 1104)  BP: (!) 104/52 (10/02/17 1104)  SpO2: (!) 94 % (10/02/17 1104)    Vital Signs (24h Range):  Temp:  [97.3 °F (36.3 °C)-98.6 °F (37 °C)] 98.3 °F (36.8 °C)  Pulse:  [] 97  Resp:  [18-20] 18  SpO2:  [92 %-97 %] 94 %  BP: (104-133)/(52-84) 104/52     Physical Exam   Constitutional: He is oriented to person, place, and time. He appears well-developed and well-nourished. No distress.   HENT:   Head: Normocephalic and atraumatic.   Right Ear: External ear normal.   Left Ear: External ear normal.   Nose: Nose normal.   Eyes: Right eye exhibits no discharge. Left eye exhibits no discharge. No scleral icterus.   Neck: Normal range of motion.   Cardiovascular: Normal rate, regular rhythm and intact distal pulses.    Pulmonary/Chest: Effort normal. No respiratory distress. He has no wheezes.   Abdominal: Soft. He exhibits no distension. There is no tenderness.   Musculoskeletal: Normal range of motion. He exhibits no edema or tenderness.   Neurological: He is alert and oriented to person, place, and time.   -  Mental Status:  AAOx3.  Follows commands.  Answers correct age and .  Recent and remote memory intact.  -  Speech and language:  no aphasia or dysarthria.    -  Vision:  no hemianopsia or ptosis.    -  Facial movement (CN VII): symmetrical   -  Coordination:  Finger to nose exam:  RUE normal, LUE dysmetria.  Heel to shin:  RLE normal, LLE ataxic. (Hypometria/hypermetria/dysmetria/ataxia)  Ataxia with rapid alt movements.  -  Motor:  No pronator drift. RUE: 5/5.  LUE: 5-/5.  RLE: 5/5.  LLE: 4/5.  -  Sensory:  Intact to light touch and pin prick.   Skin: Skin is warm and dry. No rash noted.   Psychiatric: He has a normal mood and affect. His behavior is normal. Thought content normal. Cognition and memory are impaired.   Vitals reviewed.    Diagnostic Results:   Labs: Reviewed  US: Reviewed  CT: Reviewed  MRI: Reviewed      NEUROLOGICAL EXAMINATION:     MENTAL STATUS   Oriented to person, place, and  time.

## 2017-10-02 NOTE — PROGRESS NOTES
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  Bed mobility min-maxA.  Sit to stand maxA and transfers maxA.  UBD maxA and LBD totalA.  Passed bedside swallow evaluation.  SLP recommending regular diet and thin liquids.  Found to have cognitive-linguistic impairments.    9/28/17: Participating with therapy.  Bed mobility CGA-MaxA.  Sit to stand MaxA and transfers ModA.  UBD ModA and LBD MaxA.  9/30/17:  Participating with therapy.  Bed mobility SV-Candida.  Sit to stand CGA-Candida and transfers Candida.  Ambulated 95 ft Candida.  UBD Candida and LBD Candida.    Interval History: (10/02/2017) Patient is seen for follow-up rehab evaluation and recommendations.  No acute events over night.  Stepped down to floor over the weekend.  Doing well with therapy.  Barriers for discharge/rehab admission: Insurance approval pending for rehab admission.      HPI, Past Medical, Surgical, Family, and Social History remains the same as documented in the initial encounter.    Scheduled Medications:    amlodipine  10 mg Oral Daily    atorvastatin  40 mg Oral QHS    hydrALAZINE  75 mg Oral Q8H    levetiracetam  500 mg Oral BID    lisinopril  40 mg Oral Daily    polyethylene glycol  17 g Oral Daily    senna-docusate 8.6-50 mg  1 tablet Oral BID    sodium chloride 0.9%  3 mL Intravenous Q8H       PRN Medications: acetaminophen, hydrALAZINE, labetalol, magnesium oxide, magnesium oxide, ondansetron, potassium chloride 10%, potassium chloride 10%, potassium chloride 10%, potassium, sodium phosphates, potassium, sodium phosphates, potassium, sodium phosphates    Review of Systems   Constitutional:  Negative for chills, fatigue and fever.   HENT: Negative for drooling, hearing loss, trouble swallowing and voice change.    Eyes: Negative for pain and visual disturbance.   Respiratory: Negative for cough, shortness of breath and wheezing.    Cardiovascular: Negative for chest pain and palpitations.   Gastrointestinal: Negative for abdominal pain, nausea and vomiting.   Genitourinary: Negative for difficulty urinating and flank pain.   Musculoskeletal: Positive for gait problem and myalgias. Negative for arthralgias, back pain and neck pain.   Skin: Negative for rash and wound.   Neurological: Positive for weakness. Negative for dizziness, numbness and headaches.   Psychiatric/Behavioral: Negative for agitation and hallucinations. The patient is not nervous/anxious.      Objective:     Vital Signs (Most Recent):  Temp: 98.3 °F (36.8 °C) (10/02/17 1104)  Pulse: 97 (10/02/17 1104)  Resp: 18 (10/02/17 1104)  BP: (!) 104/52 (10/02/17 1104)  SpO2: (!) 94 % (10/02/17 1104)    Vital Signs (24h Range):  Temp:  [97.3 °F (36.3 °C)-98.6 °F (37 °C)] 98.3 °F (36.8 °C)  Pulse:  [] 97  Resp:  [18-20] 18  SpO2:  [92 %-97 %] 94 %  BP: (104-133)/(52-84) 104/52     Physical Exam   Constitutional: He is oriented to person, place, and time. He appears well-developed and well-nourished. No distress.   HENT:   Head: Normocephalic and atraumatic.   Right Ear: External ear normal.   Left Ear: External ear normal.   Nose: Nose normal.   Eyes: Right eye exhibits no discharge. Left eye exhibits no discharge. No scleral icterus.   Neck: Normal range of motion.   Cardiovascular: Normal rate, regular rhythm and intact distal pulses.    Pulmonary/Chest: Effort normal. No respiratory distress. He has no wheezes.   Abdominal: Soft. He exhibits no distension. There is no tenderness.   Musculoskeletal: Normal range of motion. He exhibits no edema or tenderness.   Neurological: He is alert and oriented to person, place, and time.   -  Mental  Status:  AAOx3.  Follows commands.  Answers correct age and .  Recent and remote memory intact.  -  Speech and language:  no aphasia or dysarthria.    -  Vision:  no hemianopsia or ptosis.    -  Facial movement (CN VII): symmetrical   -  Coordination:  Finger to nose exam:  RUE normal, LUE dysmetria.  Heel to shin:  RLE normal, LLE ataxic. (Hypometria/hypermetria/dysmetria/ataxia)  Ataxia with rapid alt movements.  -  Motor:  No pronator drift. RUE: .  LUE: 5-.  RLE: .  LLE: .  -  Sensory:  Intact to light touch and pin prick.   Skin: Skin is warm and dry. No rash noted.   Psychiatric: He has a normal mood and affect. His behavior is normal. Thought content normal. Cognition and memory are impaired.   Vitals reviewed.    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    -  CTH showed L cerebellar ICH.  MRI re-demonstrated ICH.   -  Found to be hypertensive and started on Cardene infusion.    -  Neurosurgery and vascular neurology following.  -  Etiology likely hypertensive vs ischemic stroke with hemorrhagic transformation while on rivaroxaban.    Functional status: see hospital course  Cognitive/Speech/Language status:  Cognitive-Linguistic Impairment.  Nutrition/Swallow Status:  Passed bedside swallow evaluation.  SLP recommending regular diet and thin liquids.    Recommendations  -  Encourage mobility, OOB in chair at least 3 hours per day, and early ambulation as appropriate   -  PT/OT evaluate and treat  -  SLP speech and cognitive evaluate and treat  -  Monitor sleep disturbances and establish consistent sleep-wake cycle  -  Monitor for bowel and bladder dysfunction  -  Monitor for shoulder pain and subluxation  -  Monitor for spasticity  -  Monitor for and prevent skin breakdown and pressure  ulcers  · Early mobility, repositioning/weight shifting every 20-30 minutes when sitting, turn patient every 2 hours, proper mattress/overlay and chair cushioning, pressure relief/heel protector boots  -  DVT prophylaxis:  ILANA, SCD  -  Reviewed discharge options (IP rehab, SNF, HH therapy, and OP therapy)        History of deep venous thrombosis (DVT) of distal vein of left lower extremity 11/2016    -  On home rivaroxaban, holding for ICH  -  Repeat BLE US for history of DVT-negative.        History of blood clots    -  On home rivaroxaban, holding for ICH        Prostate cancer s/p treatment, currently under PSA surveillance    -  Risk factor for increased clotting  -  H/o PE 2012, DVT 2016  -  Heme/onc to determine long-term anticoagulation (consult? Outpatient?)  -  Holding Xarelto at least 1 week for ICH        Patient approved for [facility].  Insurance pending admission.  Transfer to rehab when insurance clears and he is medically ready for discharge.    JF Knapp  Department of Physical Medicine & Rehab   [facility]

## 2017-10-02 NOTE — ASSESSMENT & PLAN NOTE
-  Presented with severe headache with associated N/V, vertigo, and diaphoresis.    -  CTH showed L cerebellar ICH.  MRI re-demonstrated ICH.   -  Found to be hypertensive and started on Cardene infusion.    -  Neurosurgery and vascular neurology following.  -  Etiology likely hypertensive vs ischemic stroke with hemorrhagic transformation while on rivaroxaban.    Functional status: see hospital course  Cognitive/Speech/Language status:  Cognitive-Linguistic Impairment.  Nutrition/Swallow Status:  Passed bedside swallow evaluation.  SLP recommending regular diet and thin liquids.    Recommendations  -  Encourage mobility, OOB in chair at least 3 hours per day, and early ambulation as appropriate   -  PT/OT evaluate and treat  -  SLP speech and cognitive evaluate and treat  -  Monitor sleep disturbances and establish consistent sleep-wake cycle  -  Monitor for bowel and bladder dysfunction  -  Monitor for shoulder pain and subluxation  -  Monitor for spasticity  -  Monitor for and prevent skin breakdown and pressure ulcers  · Early mobility, repositioning/weight shifting every 20-30 minutes when sitting, turn patient every 2 hours, proper mattress/overlay and chair cushioning, pressure relief/heel protector boots  -  DVT prophylaxis:  ILANA, SCD  -  Reviewed discharge options (IP rehab, SNF, HH therapy, and OP therapy)

## 2017-10-02 NOTE — PT/OT/SLP PROGRESS
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  Pt agreeable to PT session    Pain/Comfort  Pain Rating 1: 0/10  Pain Rating Post-Intervention 1: 0/10    Objective:   Patient found with: bed alarm, telemetry (sup in bed with no family present)        FUNCTIONAL MOBILITY    Bed Mobility (with vc's for sequencing and safe technique of functional mobility):        Rolling to the R with CGA no use of bedrail       Sup > sit at the EOB with SBA from L side lying        Sit > sup Not performed 2* pt left seated UIC        Scooting hips to the EOB upon sitting with CGA x2 scoots           Transfers  (vc's for hand placement and safety of task)       Sit > stand from EOB with no AD requiring CGA for balance       Stand > sit to EOB/BS chair requiring CGA/min A for controlled descent           Gait        Distance: 88ft x2 trials with 2 LOB laterally       Assistive Device: L HHA/no Ad       Assistance Required: CG/min A for balance       Verbal Cues required: for postural control, appropriate weight shift, B step length, gina and safety       Gait Deviations: Pt demonstrates decreased gina, increased time in double stance,                              Decreased B step length, decreased stride length,                             decreased toe-to-floor clearance, decreased weight-shifting ability and R lateral  lean      THERAPEUTIC ACTIVITIES     SITTING        Pt tolerates sitting at the EOB with SBA for balance with B UE support while in weight bearing       Position on mattress with vc's for upright posture.     DYNAMIC STANDING BALANCE          Pt performs tandem walking with R HHA requiring       Education:  Education provided to pt regarding: safety with OOB mobility.    Whiteboard updated with correct mobility information. RN/PCT notified.  Pt apropriate to amb in hallway with therapy ONLY at this time.   Pt safe to amb to the restroom with RN/PCT: Use no AD & 1 person assist.    AM-PAC 6 CLICK MOBILITY  How much help from another person does this patient currently need?   1 = Unable, Total/Dependent Assistance  2 = A lot, Maximum/Moderate Assistance  3 = A little, Minimum/Contact Guard/Supervision  4 = None, Modified [place]/Independent    Turning over in bed (including adjusting bedclothes, sheets and blankets)?: 3  Sitting down on and standing up from a chair with arms (e.g., wheelchair, bedside commode, etc.): 3  Moving from lying on back to sitting on the side of the bed?: 3  Moving to and from a bed to a chair (including a wheelchair)?: 3  Need to walk in hospital room?: 3  Climbing 3-5 steps with a railing?: 2  Total Score: 17    AM-PAC Raw Score CMS G-Code Modifier Level of Impairment Assistance   6 % Total / Unable   7 - 9 CM 80 - 100% Maximal Assist   10 - 14 CL 60 - 80% Moderate Assist   15 - 19 CK 40 - 60% Moderate Assist   20 - 22 CJ 20 - 40% Minimal Assist   23 CI 1-20% SBA / CGA   24 CH 0% Independent/ Mod I     Patient left up in chair with all lines intact, call button in reach and RN notified.    Assessment:  Otis Salcido is a 67 y.o. 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  Pt will cont to benefit from skilled PT intervention to address deficits and improve functional mobility.    Rehab identified problem list/impairments: Rehab identified problem list/impairments: weakness, impaired endurance, impaired sensation, impaired self care skills, impaired functional mobilty, gait instability, impaired balance, decreased coordination, decreased upper extremity function, decreased lower extremity function, decreased safety awareness, impaired coordination, impaired fine motor    Rehab potential is good.    Activity tolerance: Good    Discharge recommendations: Discharge Facility/Level Of Care Needs: rehabilitation facility     Barriers to discharge: Barriers to Discharge: Decreased caregiver support (pt lives alone)    Equipment recommendations: Equipment Needed After Discharge:  (TBD)     GOALS:    Physical Therapy Goals        Problem: Physical Therapy Goal    Goal Priority Disciplines Outcome Goal Variances Interventions   Physical Therapy Goal     PT/OT, PT Ongoing (interventions implemented as appropriate)     Description:  PT goals until 10/6/17     1. Pt supine to sit with CGA-not met  2. Pt sit to supine with CGA-not met  3. Pt sit to stand with or without RW as needed for safety with CGA-not met  4. Pt to perform gait 100ft with or without RW as needed for safety with CGA.-not met  5. Pt to transfer bed to/from bedside chair with CGA.-not met  6. Pt to perform B LE exs in sitting x 20 reps with handout.-not met                      PLAN:    Patient to be seen 5 x/week  to address the above listed problems via gait training, therapeutic activities, therapeutic exercises, neuromuscular re-education  Plan of Care expires: 10/27/17  Plan of Care reviewed with: patient         Meera POWER Adrian, PTA  10/02/2017

## 2017-10-02 NOTE — PLAN OF CARE
Problem: SLP Goal  Goal: SLP Goal  Speech Language Pathology Goals  Goals expected to be met by 10/4  1.  Assess functional reading and writing skills  2.  Respond to categorization tasks with 80% accuracy  4.  REspond to mental manipulation tasks 75% accuracy.    5.  Respond to problem solving tasks with 80% accuracy        Outcome: Ongoing (interventions implemented as appropriate)  Pt. Progressing towards goals.    Maria El MA/FOX-SLP  Speech Language Pathologist  Pager [phone]  10/2/2017

## 2017-10-02 NOTE — ASSESSMENT & PLAN NOTE
66 yo M with PMHx HTN, HLD, PEs on rivaroxaban, and prostate cancer [place] [facility] [place] after sudden onset of headache, vertigo, and n/v approx 10-11 pm 09/26/17. CT head showing 2.6 cm L cerebellar hemorrhage.     Antiplatelets: none, ICH, hold anticoagulation 4 weeks; Spoke with Heme/Onc on whether NOAC is suitable in setting of previous DVT with hx of prostate cancer and potential hypercoagulable state. They will follow outpatient.  Statins: Atorvastatin 40  SBP <160  DVT ppx: SCDs  PT/OT and speech recommending IP Rehab; Insurance approving SNF  Neuro checks q4h

## 2017-10-02 NOTE — PT/OT/SLP PROGRESS
"Speech Language Pathology  Treatment    Otis Salcido   MRN: [id number]   Admitting Diagnosis: Left-sided nontraumatic intracerebral hemorrhage of cerebellum    Diet recommendations: Solid Diet Level: Regular  Liquid Diet Level: Thin     SLP Treatment Date: 10/02/17  Speech Start Time: 1245     Speech Stop Time: 1300     Speech Total (min): 15 min       TREATMENT BILLABLE MINUTES:  Speech Therapy Individual 15    Has the patient been evaluated by SLP for swallowing? : Yes  Keep patient NPO?: No   General Precautions: Standard, fall  Current Respiratory Status:  (room air)       Subjective:  "I could stay wth my children if needed"    Pain/Comfort  Pain Rating 1: 0/10  Pain Rating Post-Intervention 1: 0/10    Objective:      Pt. Seen at bedside and was alert and oriented x3 with good recall of recent events.  He responded to simple categorization tasks with 80-90% accuracy involving naming 3 category members and naming category member described with mild delays in responding.   Pt. Still without glassess and stated he could not read print.  Affect was flat.                Assessment:  Otis Salcido is a 67 y.o. male with a medical diagnosis of Left-sided nontraumatic intracerebral hemorrhage of cerebellum and presents with mild cog ling  Discharge recommendations: Discharge Facility/Level Of Care Needs: nursing facility, skilled     Goals:    SLP Goals        Problem: SLP Goal    Goal Priority Disciplines Outcome   SLP Goal     SLP Ongoing (interventions implemented as appropriate)   Description:  Speech Language Pathology Goals  Goals expected to be met by 10/4  1.  Assess functional reading and writing skills  2.  Respond to categorization tasks with 80% accuracy  4.  REspond to mental manipulation tasks 75% accuracy.    5.  Respond to problem solving tasks with 80% accuracy                          Plan:   Patient to be seen Therapy Frequency: 5 x/week   Plan of Care expires: 10/26/17  Plan of Care reviewed with: " patient  SLP Follow-up?: Yes  SLP - Next Visit Date: 09/28/17           Maria El MA, CCC-SLP  10/02/2017

## 2017-10-03 ENCOUNTER — HOSPITAL ENCOUNTER (INPATIENT)
Facility: HOSPITAL | Age: 67
LOS: 14 days | Discharge: HOME HEALTH | DRG: 057 | End: 2017-10-17
Attending: HOSPITALIST | Admitting: INTERNAL MEDICINE
Payer: MEDICARE

## 2017-10-03 VITALS
OXYGEN SATURATION: 96 % | BODY MASS INDEX: 28.41 KG/M2 | RESPIRATION RATE: 18 BRPM | SYSTOLIC BLOOD PRESSURE: 119 MMHG | HEART RATE: 86 BPM | DIASTOLIC BLOOD PRESSURE: 60 MMHG | WEIGHT: 181 LBS | TEMPERATURE: 98 F | HEIGHT: 67 IN

## 2017-10-03 DIAGNOSIS — R53.81 DEBILITY: ICD-10-CM

## 2017-10-03 DIAGNOSIS — Z86.718 HISTORY OF DEEP VENOUS THROMBOSIS (DVT) OF DISTAL VEIN OF LEFT LOWER EXTREMITY: ICD-10-CM

## 2017-10-03 DIAGNOSIS — I61.4: ICD-10-CM

## 2017-10-03 DIAGNOSIS — Z86.718 HISTORY OF BLOOD CLOTS: ICD-10-CM

## 2017-10-03 DIAGNOSIS — C61 PROSTATE CANCER: Primary | Chronic | ICD-10-CM

## 2017-10-03 DIAGNOSIS — I61.4 NONTRAUMATIC INTRACEREBRAL HEMORRHAGE OF CEREBELLUM, UNSPECIFIED LATERALITY: ICD-10-CM

## 2017-10-03 DIAGNOSIS — E78.00 HIGH CHOLESTEROL: ICD-10-CM

## 2017-10-03 DIAGNOSIS — I10 ESSENTIAL HYPERTENSION: ICD-10-CM

## 2017-10-03 LAB
ALBUMIN SERPL BCP-MCNC: 3.1 G/DL
ALP SERPL-CCNC: 86 U/L
ALT SERPL W/O P-5'-P-CCNC: 23 U/L
ANION GAP SERPL CALC-SCNC: 9 MMOL/L
AST SERPL-CCNC: 28 U/L
BASOPHILS # BLD AUTO: 0.01 K/UL
BASOPHILS NFR BLD: 0.2 %
BILIRUB SERPL-MCNC: 0.6 MG/DL
BUN SERPL-MCNC: 16 MG/DL
CALCIUM SERPL-MCNC: 9.3 MG/DL
CHLORIDE SERPL-SCNC: 105 MMOL/L
CO2 SERPL-SCNC: 24 MMOL/L
CREAT SERPL-MCNC: 1 MG/DL
DIFFERENTIAL METHOD: ABNORMAL
EOSINOPHIL # BLD AUTO: 0.1 K/UL
EOSINOPHIL NFR BLD: 1.6 %
ERYTHROCYTE [DISTWIDTH] IN BLOOD BY AUTOMATED COUNT: 13.4 %
EST. GFR  (AFRICAN AMERICAN): >60 ML/MIN/1.73 M^2
EST. GFR  (NON AFRICAN AMERICAN): >60 ML/MIN/1.73 M^2
GLUCOSE SERPL-MCNC: 98 MG/DL
HCT VFR BLD AUTO: 41.3 %
HGB BLD-MCNC: 13.5 G/DL
LYMPHOCYTES # BLD AUTO: 1.7 K/UL
LYMPHOCYTES NFR BLD: 33 %
MAGNESIUM SERPL-MCNC: 2 MG/DL
MCH RBC QN AUTO: 28.7 PG
MCHC RBC AUTO-ENTMCNC: 32.7 G/DL
MCV RBC AUTO: 88 FL
MONOCYTES # BLD AUTO: 0.6 K/UL
MONOCYTES NFR BLD: 11.9 %
NEUTROPHILS # BLD AUTO: 2.7 K/UL
NEUTROPHILS NFR BLD: 53.1 %
PHOSPHATE SERPL-MCNC: 4.3 MG/DL
PLATELET # BLD AUTO: 202 K/UL
PMV BLD AUTO: 11.3 FL
POTASSIUM SERPL-SCNC: 3.4 MMOL/L
PROT SERPL-MCNC: 7.3 G/DL
RBC # BLD AUTO: 4.71 M/UL
SODIUM SERPL-SCNC: 138 MMOL/L
WBC # BLD AUTO: 5.03 K/UL

## 2017-10-03 PROCEDURE — 12000000 HC SNF SEMI-PRIVATE ROOM

## 2017-10-03 PROCEDURE — 85025 COMPLETE CBC W/AUTO DIFF WBC: CPT

## 2017-10-03 PROCEDURE — 25000003 PHARM REV CODE 250: Performed by: STUDENT IN AN ORGANIZED HEALTH CARE EDUCATION/TRAINING PROGRAM

## 2017-10-03 PROCEDURE — 83735 ASSAY OF MAGNESIUM: CPT

## 2017-10-03 PROCEDURE — A4216 STERILE WATER/SALINE, 10 ML: HCPCS | Performed by: NURSE PRACTITIONER

## 2017-10-03 PROCEDURE — 25000003 PHARM REV CODE 250: Performed by: PHYSICIAN ASSISTANT

## 2017-10-03 PROCEDURE — 97535 SELF CARE MNGMENT TRAINING: CPT

## 2017-10-03 PROCEDURE — 36415 COLL VENOUS BLD VENIPUNCTURE: CPT

## 2017-10-03 PROCEDURE — 80053 COMPREHEN METABOLIC PANEL: CPT

## 2017-10-03 PROCEDURE — 99233 SBSQ HOSP IP/OBS HIGH 50: CPT | Mod: GC,,, | Performed by: PSYCHIATRY & NEUROLOGY

## 2017-10-03 PROCEDURE — 97116 GAIT TRAINING THERAPY: CPT

## 2017-10-03 PROCEDURE — 97530 THERAPEUTIC ACTIVITIES: CPT

## 2017-10-03 PROCEDURE — 25000003 PHARM REV CODE 250: Performed by: NURSE PRACTITIONER

## 2017-10-03 PROCEDURE — 84100 ASSAY OF PHOSPHORUS: CPT

## 2017-10-03 PROCEDURE — 99232 SBSQ HOSP IP/OBS MODERATE 35: CPT | Mod: ,,, | Performed by: NURSE PRACTITIONER

## 2017-10-03 RX ORDER — RAMELTEON 8 MG/1
8 TABLET ORAL NIGHTLY PRN
Status: DISCONTINUED | OUTPATIENT
Start: 2017-10-03 | End: 2017-10-17 | Stop reason: HOSPADM

## 2017-10-03 RX ORDER — ACETAMINOPHEN 325 MG/1
650 TABLET ORAL EVERY 4 HOURS PRN
Status: DISCONTINUED | OUTPATIENT
Start: 2017-10-03 | End: 2017-10-17 | Stop reason: HOSPADM

## 2017-10-03 RX ORDER — AMLODIPINE BESYLATE 10 MG/1
10 TABLET ORAL DAILY
Status: DISCONTINUED | OUTPATIENT
Start: 2017-10-04 | End: 2017-10-17 | Stop reason: HOSPADM

## 2017-10-03 RX ORDER — POLYETHYLENE GLYCOL 3350 17 G/17G
17 POWDER, FOR SOLUTION ORAL DAILY
Status: DISCONTINUED | OUTPATIENT
Start: 2017-10-04 | End: 2017-10-09

## 2017-10-03 RX ORDER — POLYETHYLENE GLYCOL 3350 17 G/17G
17 POWDER, FOR SOLUTION ORAL DAILY
Status: CANCELLED | OUTPATIENT
Start: 2017-10-04

## 2017-10-03 RX ORDER — LISINOPRIL 20 MG/1
40 TABLET ORAL DAILY
Status: CANCELLED | OUTPATIENT
Start: 2017-10-04

## 2017-10-03 RX ORDER — LEVETIRACETAM 250 MG/1
250 TABLET ORAL NIGHTLY
Status: CANCELLED | OUTPATIENT
Start: 2017-10-03

## 2017-10-03 RX ORDER — LEVETIRACETAM 500 MG/1
500 TABLET ORAL EVERY MORNING
Status: DISCONTINUED | OUTPATIENT
Start: 2017-10-04 | End: 2017-10-17 | Stop reason: HOSPADM

## 2017-10-03 RX ORDER — CALCIUM CARBONATE 200(500)MG
500 TABLET,CHEWABLE ORAL 2 TIMES DAILY PRN
Status: DISCONTINUED | OUTPATIENT
Start: 2017-10-03 | End: 2017-10-17 | Stop reason: HOSPADM

## 2017-10-03 RX ORDER — POTASSIUM CHLORIDE 20 MEQ/1
40 TABLET, EXTENDED RELEASE ORAL ONCE
Status: COMPLETED | OUTPATIENT
Start: 2017-10-03 | End: 2017-10-03

## 2017-10-03 RX ORDER — AMOXICILLIN 250 MG
1 CAPSULE ORAL 2 TIMES DAILY
Status: CANCELLED | OUTPATIENT
Start: 2017-10-03

## 2017-10-03 RX ORDER — ATORVASTATIN CALCIUM 20 MG/1
40 TABLET, FILM COATED ORAL NIGHTLY
Status: DISCONTINUED | OUTPATIENT
Start: 2017-10-03 | End: 2017-10-17 | Stop reason: HOSPADM

## 2017-10-03 RX ORDER — ACETAMINOPHEN 325 MG/1
650 TABLET ORAL EVERY 4 HOURS PRN
Status: CANCELLED | OUTPATIENT
Start: 2017-10-03

## 2017-10-03 RX ORDER — AMOXICILLIN 250 MG
1 CAPSULE ORAL 2 TIMES DAILY
Status: DISCONTINUED | OUTPATIENT
Start: 2017-10-03 | End: 2017-10-09

## 2017-10-03 RX ORDER — LEVETIRACETAM 250 MG/1
250 TABLET ORAL NIGHTLY
Status: DISCONTINUED | OUTPATIENT
Start: 2017-10-03 | End: 2017-10-17 | Stop reason: HOSPADM

## 2017-10-03 RX ORDER — LEVETIRACETAM 500 MG/1
500 TABLET ORAL EVERY MORNING
Status: CANCELLED | OUTPATIENT
Start: 2017-10-04

## 2017-10-03 RX ORDER — AMLODIPINE BESYLATE 10 MG/1
10 TABLET ORAL DAILY
Status: CANCELLED | OUTPATIENT
Start: 2017-10-04

## 2017-10-03 RX ORDER — CALCIUM CARBONATE 200(500)MG
500 TABLET,CHEWABLE ORAL 2 TIMES DAILY PRN
Status: CANCELLED | OUTPATIENT
Start: 2017-10-03

## 2017-10-03 RX ORDER — RAMELTEON 8 MG/1
8 TABLET ORAL NIGHTLY PRN
Status: CANCELLED | OUTPATIENT
Start: 2017-10-03

## 2017-10-03 RX ORDER — LISINOPRIL 20 MG/1
40 TABLET ORAL DAILY
Status: DISCONTINUED | OUTPATIENT
Start: 2017-10-04 | End: 2017-10-17 | Stop reason: HOSPADM

## 2017-10-03 RX ORDER — ATORVASTATIN CALCIUM 20 MG/1
40 TABLET, FILM COATED ORAL NIGHTLY
Status: CANCELLED | OUTPATIENT
Start: 2017-10-03

## 2017-10-03 RX ADMIN — AMLODIPINE BESYLATE 10 MG: 10 TABLET ORAL at 09:10

## 2017-10-03 RX ADMIN — LEVETIRACETAM 500 MG: 500 TABLET, FILM COATED ORAL at 06:10

## 2017-10-03 RX ADMIN — LEVETIRACETAM 250 MG: 250 TABLET, FILM COATED ORAL at 09:10

## 2017-10-03 RX ADMIN — HYDRALAZINE HYDROCHLORIDE 75 MG: 50 TABLET ORAL at 09:10

## 2017-10-03 RX ADMIN — Medication 3 ML: at 06:10

## 2017-10-03 RX ADMIN — ATORVASTATIN CALCIUM 40 MG: 20 TABLET, FILM COATED ORAL at 09:10

## 2017-10-03 RX ADMIN — POTASSIUM CHLORIDE 40 MEQ: 1500 TABLET, EXTENDED RELEASE ORAL at 09:10

## 2017-10-03 NOTE — ASSESSMENT & PLAN NOTE
-  Risk factor for increased clotting.  -  H/o PE 2012, DVT 2016.  -  Follow up outpatient with Heme/onc to determine long-term anticoagulation.  -  Holding Xarelto at least 1 week for ICH.

## 2017-10-03 NOTE — PLAN OF CARE
Problem: Patient Care Overview  Goal: Plan of Care Review  POC reviewed with pt; verbalized understanding. AAOx4. VSS. No acute changes throughout sift. Safety precautions maintained. Pt to be D/C to [facility] in [place]. D/C instructions given; verbalized understanding. Transport to be here at 1545. Will continue to monitor.

## 2017-10-03 NOTE — ASSESSMENT & PLAN NOTE
68 yo M with PMHx HTN, HLD, PEs on rivaroxaban, and prostate cancer presents to [facility] ED 09/27/17 after sudden onset of headache, vertigo, and n/v approx 10-11 pm 09/26/17. CT head showing 2.6 cm L cerebellar hemorrhage.     Antiplatelets: none, ICH, hold anticoagulation 4 weeks; Spoke with Heme/Onc on whether NOAC is suitable in setting of previous DVT with hx of prostate cancer and potential hypercoagulable state. They will follow outpatient.  Statins: Atorvastatin 40  SBP <160  DVT ppx: SCDs  PT/OT and speech recommending IP Rehab; Insurance approving SNF  Neuro checks q4h

## 2017-10-03 NOTE — PROGRESS NOTES
Called report 2x to [facility]. Was on hold the first call for 8.5 minutes. On hold the second call for 5 minutes, and gave my extension number. Finally, got in touch with the nurse, Nieves, at 1625 to give report. Report given.

## 2017-10-03 NOTE — PLAN OF CARE
Problem: Patient Care Overview  Goal: Plan of Care Review  Outcome: Ongoing (interventions implemented as appropriate)  Plan of care reviewed with pt, pt able to verbalize understanding and acceptance, aaox4 with delayed responses, pleasant calm and cooperative.  Pt free from falls or injury. No new skin breakdown noted, pt continent x2, walks to bathroom x1 assist.   VS stable throughout shift. No s/sx of distress noted. [facility]    Temp:  [97.9 °F (36.6 °C)-99.4 °F (37.4 °C)]   Pulse:  []   Resp:  [16-18]   BP: (107-128)/(56-68)   SpO2:  [92 %-99 %]

## 2017-10-03 NOTE — PROGRESS NOTES
Pt bp 107/57 at 0400. RAMON Porter with stroke team contacted regarding 0600 dose of Hydralazine, 75mg oral. Instructed to hold dose. WCTM

## 2017-10-03 NOTE — SUBJECTIVE & OBJECTIVE
Interval History: (10/03/2017) Patient is seen for follow-up rehab evaluation and recommendations.  No acute events over night.  Doing well, no new complaints.  Participating with therapy.  Barriers for discharge/rehab admission: PHN denied IPR    HPI, Past Medical, Surgical, Family, and Social History remains the same as documented in the initial encounter.    Scheduled Medications:    amlodipine  10 mg Oral Daily    atorvastatin  40 mg Oral QHS    hydrALAZINE  75 mg Oral Q8H    levetiracetam  250 mg Oral Nightly    levetiracetam  500 mg Oral QAM    lisinopril  40 mg Oral Daily    polyethylene glycol  17 g Oral Daily    senna-docusate 8.6-50 mg  1 tablet Oral BID    sodium chloride 0.9%  3 mL Intravenous Q8H       PRN Medications: acetaminophen, hydrALAZINE, labetalol, ondansetron    Review of Systems   Constitutional: Negative for chills, fatigue and fever.   HENT: Negative for drooling, hearing loss, trouble swallowing and voice change.    Eyes: Negative for pain and visual disturbance.   Respiratory: Negative for cough, shortness of breath and wheezing.    Cardiovascular: Negative for chest pain and palpitations.   Gastrointestinal: Negative for abdominal pain, nausea and vomiting.   Genitourinary: Negative for difficulty urinating and flank pain.   Musculoskeletal: Positive for gait problem and myalgias. Negative for arthralgias, back pain and neck pain.   Skin: Negative for rash and wound.   Neurological: Positive for weakness. Negative for dizziness, numbness and headaches.   Psychiatric/Behavioral: Negative for agitation and hallucinations. The patient is not nervous/anxious.      Objective:     Vital Signs (Most Recent):  Temp: 99.3 °F (37.4 °C) (10/03/17 0730)  Pulse: 91 (10/03/17 0730)  Resp: 17 (10/03/17 0730)  BP: 125/65 (10/03/17 0917)  SpO2: (!) 93 % (10/03/17 0730)    Vital Signs (24h Range):  Temp:  [97.8 °F (36.6 °C)-99.4 °F (37.4 °C)] 99.3 °F (37.4 °C)  Pulse:  [] 91  Resp:  [16-18]  17  SpO2:  [92 %-99 %] 93 %  BP: (104-128)/(52-68) 125/65     Physical Exam   Constitutional: He is oriented to person, place, and time. He appears well-developed and well-nourished. No distress.   HENT:   Head: Normocephalic and atraumatic.   Right Ear: External ear normal.   Left Ear: External ear normal.   Nose: Nose normal.   Eyes: Right eye exhibits no discharge. Left eye exhibits no discharge. No scleral icterus.   Neck: Normal range of motion.   Cardiovascular: Normal rate, regular rhythm and intact distal pulses.    Pulmonary/Chest: Effort normal. No respiratory distress. He has no wheezes.   Abdominal: Soft. He exhibits no distension. There is no tenderness.   Musculoskeletal: Normal range of motion. He exhibits no edema or tenderness.   Neurological: He is alert and oriented to person, place, and time.   -  Mental Status:  AAOx3.  Follows commands.  Answers correct age and .  Recent and remote memory intact.  -  Speech and language:  no aphasia or dysarthria.    -  Vision:  no hemianopsia or ptosis.    -  Facial movement (CN VII): symmetrical   -  Coordination:  Finger to nose exam:  RUE normal, LUE dysmetria.  Heel to shin:  RLE normal, LLE ataxic. (Hypometria/hypermetria/dysmetria/ataxia)  Ataxia with rapid alt movements.  -  Motor:  No pronator drift. RUE: 5/5.  LUE: 5-/5.  RLE: 5/5.  LLE: 4/5.  -  Sensory:  Intact to light touch and pin prick.   Skin: Skin is warm and dry. No rash noted.   Psychiatric: He has a normal mood and affect. His behavior is normal. Thought content normal. Cognition and memory are impaired.   Vitals reviewed.    Diagnostic Results:   Labs: Reviewed  US: Reviewed  CT: Reviewed  MRI: Reviewed    NEUROLOGICAL EXAMINATION:     MENTAL STATUS   Oriented to person, place, and time.

## 2017-10-03 NOTE — PLAN OF CARE
Problem: Physical Therapy Goal  Goal: Physical Therapy Goal  PT goals until 10/6/17     1. Pt supine to sit with CGA-not met  2. Pt sit to supine with CGA-not met  3. Pt sit to stand with or without RW as needed for safety with CGA-met      Pt sit to stand with stand by assist and no AD- not met  4. Pt to perform gait 100ft with or without RW as needed for safety with CGA.-partially met      Pt to perform gait x 200 feet stand by assist with no AD and no significant path deviation  5. Pt to transfer bed to/from bedside chair with CGA.-met  6. Pt to perform B LE exs in sitting x 20 reps with handout.-not met     Outcome: Ongoing (interventions implemented as appropriate)  POC reviewed and goals updated. Patient progressing well. Continues to require skilled PT services to address deficits and safety concerns.     Monica Echeverria, PT  10/3/2017  [phone] (pager)

## 2017-10-03 NOTE — PLAN OF CARE
Problem: Occupational Therapy Goal  Goal: Occupational Therapy Goal  Goals achieved for transfers, lower body dressing and toileting.  Revised goals set 9/30 to be addressed for 7 days with expiration date, 10/7:  Patient will increase functional independence with ADLs by performing:    Patient will demonstrate rolling to the right with modified independence.  Not met   Patient will demonstrate rolling to the left with modified independence.   Not met  Patient will demonstrate supine -sit with modified independence.   Not met  Patient will demonstrate stand pivot transfers with supervision.   Not met  Patient will demonstrate grooming while standing with modified independence.   Not met  Patient will demonstrate upper body dressing with supervision, including gathering clothing. Not met  Patient will demonstrate lower body dressing with supervision.   Not met  Patient will demonstrate toileting with supervision.   Not met  Patient's family / caregiver will demonstrate independence and safety with assisting patient with self-care skills and functional mobility.     Not met  Patient and/or patient's family will verbalize understanding of stroke prevention guidelines, personal risk factors and stroke warning signs via teachback method.  Not met           Outcome: Ongoing (interventions implemented as appropriate)  Progressing well with goals. Continue with POC.   Marivel epstein OT  10/3/2017

## 2017-10-03 NOTE — ASSESSMENT & PLAN NOTE
PE in 2012  DVT in 2016  Hx of prostate cancer  Will hold anticoagulation for 4 weeks; Will F/U with Heme/Onc as an out patient on whether or not to use NOAC

## 2017-10-03 NOTE — PROGRESS NOTES
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  Bed mobility min-maxA.  Sit to stand maxA and transfers maxA.  UBD maxA and LBD totalA.  Passed bedside swallow evaluation.  SLP recommending regular diet and thin liquids.  Found to have cognitive-linguistic impairments.    9/28/17: Participating with therapy.  Bed mobility CGA-MaxA.  Sit to stand MaxA and transfers ModA.  UBD ModA and LBD MaxA.  9/30/17:  Participating with therapy.  Bed mobility SV-Candida.  Sit to stand CGA-Candida and transfers Candida.  Ambulated 95 ft Candida.  UBD Candida and LBD Candida.  10/2/17:  Progressing with therapy.  Bed mobility SV-CGA.  Sit to stand CGA-Candida and transfers CGA-Candida.  Ambulated 88 ft x 2 CGA-Candida.  UBD Candida.  Found to have mild cognitive-linguistic impairment.      Interval History: (10/03/2017) Patient is seen for follow-up rehab evaluation and recommendations.  No acute events over night.  Doing well, no new complaints.  Participating with therapy.  Barriers for discharge/rehab admission: PHN denied IPR    HPI, Past Medical, Surgical, Family, and Social History remains the same as documented in the initial encounter.    Scheduled Medications:    amlodipine  10 mg Oral Daily    atorvastatin  40 mg Oral QHS    hydrALAZINE  75 mg Oral Q8H    levetiracetam  250 mg Oral Nightly    levetiracetam  500 mg Oral QAM    lisinopril  40 mg Oral Daily    polyethylene glycol  17 g Oral Daily    senna-docusate 8.6-50 mg  1 tablet Oral BID    sodium chloride 0.9%  3 mL Intravenous Q8H       PRN Medications: acetaminophen, hydrALAZINE, labetalol, ondansetron    Review of Systems    Constitutional: Negative for chills, fatigue and fever.   HENT: Negative for drooling, hearing loss, trouble swallowing and voice change.    Eyes: Negative for pain and visual disturbance.   Respiratory: Negative for cough, shortness of breath and wheezing.    Cardiovascular: Negative for chest pain and palpitations.   Gastrointestinal: Negative for abdominal pain, nausea and vomiting.   Genitourinary: Negative for difficulty urinating and flank pain.   Musculoskeletal: Positive for gait problem and myalgias. Negative for arthralgias, back pain and neck pain.   Skin: Negative for rash and wound.   Neurological: Positive for weakness. Negative for dizziness, numbness and headaches.   Psychiatric/Behavioral: Negative for agitation and hallucinations. The patient is not nervous/anxious.      Objective:     Vital Signs (Most Recent):  Temp: 99.3 °F (37.4 °C) (10/03/17 0730)  Pulse: 91 (10/03/17 0730)  Resp: 17 (10/03/17 0730)  BP: 125/65 (10/03/17 0917)  SpO2: (!) 93 % (10/03/17 0730)    Vital Signs (24h Range):  Temp:  [97.8 °F (36.6 °C)-99.4 °F (37.4 °C)] 99.3 °F (37.4 °C)  Pulse:  [] 91  Resp:  [16-18] 17  SpO2:  [92 %-99 %] 93 %  BP: (104-128)/(52-68) 125/65     Physical Exam   Constitutional: He is oriented to person, place, and time. He appears well-developed and well-nourished. No distress.   HENT:   Head: Normocephalic and atraumatic.   Right Ear: External ear normal.   Left Ear: External ear normal.   Nose: Nose normal.   Eyes: Right eye exhibits no discharge. Left eye exhibits no discharge. No scleral icterus.   Neck: Normal range of motion.   Cardiovascular: Normal rate, regular rhythm and intact distal pulses.    Pulmonary/Chest: Effort normal. No respiratory distress. He has no wheezes.   Abdominal: Soft. He exhibits no distension. There is no tenderness.   Musculoskeletal: Normal range of motion. He exhibits no edema or tenderness.   Neurological: He is alert and oriented to person, place, and  time.   -  Mental Status:  AAOx3.  Follows commands.  Answers correct age and .  Recent and remote memory intact.  -  Speech and language:  no aphasia or dysarthria.    -  Vision:  no hemianopsia or ptosis.    -  Facial movement (CN VII): symmetrical   -  Coordination:  Finger to nose exam:  RUE normal, LUE dysmetria.  Heel to shin:  RLE normal, LLE ataxic. (Hypometria/hypermetria/dysmetria/ataxia)  Ataxia with rapid alt movements.  -  Motor:  No pronator drift. RUE: .  LUE: 5-.  RLE: .  LLE: .  -  Sensory:  Intact to light touch and pin prick.   Skin: Skin is warm and dry. No rash noted.   Psychiatric: He has a normal mood and affect. His behavior is normal. Thought content normal. Cognition and memory are impaired.   Vitals reviewed.    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    -  CTH showed L cerebellar ICH.  MRI re-demonstrated ICH.   -  Found to be hypertensive and started on Cardene infusion.    -  Neurosurgery and vascular neurology following.  -  Etiology likely hypertensive vs ischemic stroke with hemorrhagic transformation while on rivaroxaban.    Functional status: see hospital course  Cognitive/Speech/Language status:  Cognitive-Linguistic Impairment.  Nutrition/Swallow Status:  Passed bedside swallow evaluation.  SLP recommending regular diet and thin liquids.    Recommendations  -  Encourage mobility, OOB in chair at least 3 hours per day, and early ambulation as appropriate   -  PT/OT evaluate and treat  -  SLP speech and cognitive evaluate and treat  -  Monitor sleep disturbances and establish consistent sleep-wake cycle  -  Monitor for bowel and bladder dysfunction  -  Monitor for shoulder pain and subluxation  -  Monitor for spasticity  -  Monitor for and prevent skin  breakdown and pressure ulcers  · Early mobility, repositioning/weight shifting every 20-30 minutes when sitting, turn patient every 2 hours, proper mattress/overlay and chair cushioning, pressure relief/heel protector boots  -  DVT prophylaxis:  ILANA, SCD  -  Reviewed discharge options (IP rehab, SNF, HH therapy, and OP therapy)        History of deep venous thrombosis (DVT) of distal vein of left lower extremity 11/2016    -  On home rivaroxaban, holding for ICH  -  Repeat BLE US for history of DVT-negative.        History of blood clots    -  On home rivaroxaban, holding for ICH        Prostate cancer s/p treatment, currently under PSA surveillance    -  Risk factor for increased clotting.  -  H/o PE 2012, DVT 2016.  -  Follow up outpatient with Heme/onc to determine long-term anticoagulation.  -  Holding Xarelto at least 1 week for ICH.        Patient approved for [facility]; however, inpatient rehab stay was denied by his insurance.  Will sign off.  Recommend SNF.     JF Knapp  Department of Physical Medicine & Rehab   [facility]

## 2017-10-03 NOTE — SUBJECTIVE & OBJECTIVE
Modified Alicia Scale:   Timeline: At discharge  Modified Mcgrew Score: 3 - moderate disability

## 2017-10-03 NOTE — PROGRESS NOTES
[facility]  Vascular Neurology  Comprehensive Stroke Center  Progress Note    Assessment/Plan:     66 yo M with PMHx of HTN, HLD, previous PEs on rivaroxaban presents to [facility] ED 09/27/17 with sudden onset of headache, vertigo, and nausea with multiple episodes of emesis.  CT head showing ICH approx 2.6 cm.  No falls despite vertigo.  No previous history of stroke.  Symptoms are persistent but have not worsened since onset.  Now on nicardipine gtt for BP control.    9/28/17 patient neurologically stable, off cardene, MRI pending, plans for stepdown  9/29 Pt neurologically stable. Plans for stepdown. Therapy recommending inpatient rehab.  10/2 Dizziness resolved per pt. Still with LUE ataxia. PT recommends rehab, pt's insurance approving [facility]  10/3/14 Patient states his left sided ataxia is improving. Walking with PT. 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 CT head showing 2.6 cm L cerebellar hemorrhage.     Antiplatelets: none, ICH, hold anticoagulation 4 weeks; Spoke with Heme/Onc on whether NOAC is suitable in setting of previous DVT with hx of prostate cancer and potential hypercoagulable state. They will follow outpatient.  Statins: Atorvastatin 40  SBP <160  DVT ppx: SCDs  PT/OT and speech recommending IP Rehab; Insurance approving [facility]  Neuro checks q4h        Vasogenic cerebral edema    2/2 infarct  MRI showing edema with mass effect in the 4th ventricle. No hydrocephalus         History of deep venous thrombosis (DVT) of distal vein of left lower extremity 11/2016    US BLE negative        History of blood clots    PE in 2012  DVT in 2016  Hx of prostate cancer  Will hold anticoagulation for 4 weeks; Will F/U with Heme/Onc OP on whether or not to use NOAC         High cholesterol      Atorvastatin 40 mg po qd        Essential hypertension    Possible etiology of ICH  home meds- hydralazine 75 q 8 hrs, amlodipine 10 mg, lisinopril 40   Held PM hydralazine and lisinopril today 2/2 hypotension with BPs in 100's/50's, patient remains asymptomatic         Prostate cancer s/p treatment, currently under PSA surveillance    Risk factor for increased clotting  Follow-up with urologist and oncologist  Hx PE 2012  Hx DVT 2016  Recommend holding xarelto for at least 4 weeks due to ICH            Neurologic Chief Complaint: L Cerebellar ICH    Subjective:     Interval History: Patient is seen for follow-up neurological assessment and treatment recommendations: NAEON, Dizziness improved. Still with LUE ataxia. Possible d/c today pending insurance approval    HPI, Past Medical, Family, and Social History remains the same as documented in the initial encounter.     Review of Systems   Constitutional: Negative for diaphoresis and fever.   HENT: Negative for rhinorrhea and sneezing.    Eyes: Negative for discharge and redness.   Respiratory: Negative for cough and shortness of breath.    Cardiovascular: Negative for chest pain and leg swelling.   Gastrointestinal: Negative for abdominal pain and nausea.   Genitourinary: Negative for frequency and hematuria.   Musculoskeletal: Negative for arthralgias and back pain.   Skin: Negative for pallor and rash.   Neurological: Negative for dizziness and headaches.   Psychiatric/Behavioral: Negative for confusion. The patient is not nervous/anxious.      Scheduled Meds:   amlodipine  10 mg Oral Daily    atorvastatin  40 mg Oral QHS    hydrALAZINE  75 mg Oral Q8H    levetiracetam  250 mg Oral Nightly    levetiracetam  500 mg Oral QAM    lisinopril  40 mg Oral Daily    polyethylene glycol  17 g Oral Daily    senna-docusate 8.6-50 mg  1 tablet Oral BID    sodium chloride 0.9%  3 mL Intravenous Q8H     Continuous Infusions:   PRN  Meds:acetaminophen, hydrALAZINE, labetalol, ondansetron    Objective:     Vital Signs (Most Recent):  Temp: 97.5 °F (36.4 °C) (10/03/17 1152)  Pulse: 86 (10/03/17 1152)  Resp: 18 (10/03/17 1152)  BP: 119/60 (10/03/17 1152)  SpO2: 96 % (10/03/17 1152)  BP Location: Left arm    Vital Signs Range (Last 24H):  Temp:  [97.5 °F (36.4 °C)-99.4 °F (37.4 °C)]   Pulse:  []   Resp:  [16-18]   BP: (107-128)/(56-68)   SpO2:  [92 %-99 %]   BP Location: Left arm    Physical Exam   Constitutional: He is oriented to person, place, and time. He appears well-developed and well-nourished. No distress.   HENT:   Head: Normocephalic and atraumatic.   Eyes: Conjunctivae and EOM are normal.   Neck: Normal range of motion.   Pulmonary/Chest: Effort normal. No stridor. No respiratory distress.   Abdominal: He exhibits no distension. There is no guarding.   Musculoskeletal: Normal range of motion. He exhibits no deformity.   Neurological: He is alert and oriented to person, place, and time. No sensory deficit. Coordination abnormal.   Skin: Skin is warm and dry.   Psychiatric: He has a normal mood and affect. His behavior is normal. Judgment and thought content normal.       Neurological Exam:   LOC: alert and follows requests  Language: No aphasia  Speech: No dysarthria  Orientation: Person, Place, Time  Visual Fields (CN II): Full  EOM (CN III, IV, VI): Full/intact  Tongue (CN XII): to midline  Motor*: Arm Left:  Normal (5/5), Leg Left:   Normal (5/5), Arm Right:   Normal (5/5), Leg Right:   Normal (5/5)  Cerebellar*: Finger/Nose Abnormal - left upper dysmetria, Heel/Shin Abnormal - left lower  Sensation: intact to light touch  Tone: Arm-Left: normal; Leg-Left: normal; Arm-Right: normal; Leg-Right: normal    NIH Stroke Scale:    Level of Consciousness: 0 - alert  LOC Questions: 0 - answers both correctly  LOC Commands: 0 - performs both correctly  Best Gaze: 0 - normal  Visual: 0 - no visual loss  Facial Palsy: 0 - normal  Motor Left  Arm: 0 - no drift  Motor Right Arm: 0 - no drift  Motor Left Le - no drift  Motor Right Le - no drift  Limb Ataxia: 2 - present in two limbs  Sensory: 0 - normal  Best Language: 0 - no aphasia  Dysarthria: 0 - normal articulation  Extinction and Inattention: 0 - no neglect  NIH Stroke Scale Total: 2      Laboratory:  CMP:     Recent Labs  Lab 10/03/17  035   CALCIUM 9.3   ALBUMIN 3.1*   PROT 7.3      K 3.4*   CO2 24      BUN 16   CREATININE 1.0   ALKPHOS 86   ALT 23   AST 28   BILITOT 0.6     CBC:     Recent Labs  Lab 10/03/17  0354   WBC 5.03   RBC 4.71   HGB 13.5*   HCT 41.3      MCV 88   MCH 28.7   MCHC 32.7     Lipid Panel:     Recent Labs  Lab 17   CHOL 177   LDLCALC 122.2   HDL 41   TRIG 69     Coagulation:     Recent Labs  Lab 17   INR 1.0   APTT 24.0     Hgb A1C:     Recent Labs  Lab 17   HGBA1C 4.9     TSH:     Recent Labs  Lab 17   TSH 0.278*       Diagnostic Results:  I have personally reviewed:    CT Head. Date: 17  No significant change from prior.  Left cerebellar acute/recent intraparenchymal hemorrhage relatively stable. No evidence for extra-axial extension.  Slight mass effect on the fourth ventricle without hydrocephalus.  Superimposed Age-appropriate generalized cerebral volume loss with mild/moderate degree of patchy decreased attenuation supratentorial white matter suggestive for chronic microvascular ischemic change similarly seen on the prior.   Remote left parasagittal parietal/occipital infarct unchanged with stable bilateral thalamic remote lacunar type infarcts.     MRI Head. 17  1.  Acute parenchymal hemorrhage involving the paramedian superior left cerebellum with adjacent quan-hemorrhage edema and localized mass effect on the fourth ventricle without evidence of hydrocephalus.  Allowing for intrinsic T1 hyperintensity, no definite abnormal enhancement is identified within this region.  Further  evaluation with repeat contrast-enhanced MRI examination can be performed following resolution of acute hemorrhage as clinically warranted.  2.  Findings suggestive of significant chronic microvascular ischemic change, remote infarction of the paramedian left occipital lobe as well as multiple remote lacunar type infarcts in the basal ganglia, thalami and sebastian.  Few small foci of increased diffusivity within the corona radiata without corresponding hypointense signal on ADC maps likely reflecting sequela of prior infarction.     US BLE. 9/27/17  No evidence of deep venous thrombosis bilaterally.  Resolution of prior left popliteal to peroneal thrombus.     Echo. 9/29/17    1 - Concentric remodeling.     2 - Normal left ventricular systolic function (EF 60-65%).     3 - Normal right ventricular systolic function .     4 - Normal left ventricular diastolic function.     5 - No wall motion abnormalities.       Ame Diallo MD  Comprehensive Stroke Center  Department of Vascular Neurology   [facility]

## 2017-10-03 NOTE — PLAN OF CARE
Patient discharging to [facility] today. Discharge and follow-up instructions to be completed by the bedside nurse.    Future Appointments  Date Time Provider Department Center   11/6/2017 12:45 PM [facility] MRI4 [facility]   11/6/2017 2:00 PM Paul Arriaga MD [facility] [id number] [facility]      10/03/17 1443   Final Note   Assessment Type Final Discharge Note   Discharge Disposition SNF  (OS[facility])   What phone number can be called within the next 1-3 days to see how you are doing after discharge? ([phone])   Hospital Follow Up  Appt(s) scheduled? Yes   Discharge plans and expectations educations in teach back method with documentation complete? Yes

## 2017-10-03 NOTE — SUBJECTIVE & OBJECTIVE
Neurologic Chief Complaint: L Cerebellar ICH    Subjective:     Interval History: Patient is seen for follow-up neurological assessment and treatment recommendations: NAEON, Dizziness improved. Still with LUE ataxia. Possible d/c today pending insurance approval    HPI, Past Medical, Family, and Social History remains the same as documented in the initial encounter.     Review of Systems   Constitutional: Negative for diaphoresis and fever.   HENT: Negative for rhinorrhea and sneezing.    Eyes: Negative for discharge and redness.   Respiratory: Negative for cough and shortness of breath.    Cardiovascular: Negative for chest pain and leg swelling.   Gastrointestinal: Negative for abdominal pain and nausea.   Genitourinary: Negative for frequency and hematuria.   Musculoskeletal: Negative for arthralgias and back pain.   Skin: Negative for pallor and rash.   Neurological: Negative for dizziness and headaches.   Psychiatric/Behavioral: Negative for confusion. The patient is not nervous/anxious.      Scheduled Meds:   amlodipine  10 mg Oral Daily    atorvastatin  40 mg Oral QHS    hydrALAZINE  75 mg Oral Q8H    levetiracetam  250 mg Oral Nightly    levetiracetam  500 mg Oral QAM    lisinopril  40 mg Oral Daily    polyethylene glycol  17 g Oral Daily    senna-docusate 8.6-50 mg  1 tablet Oral BID    sodium chloride 0.9%  3 mL Intravenous Q8H     Continuous Infusions:   PRN Meds:acetaminophen, hydrALAZINE, labetalol, ondansetron    Objective:     Vital Signs (Most Recent):  Temp: 97.5 °F (36.4 °C) (10/03/17 1152)  Pulse: 86 (10/03/17 1152)  Resp: 18 (10/03/17 1152)  BP: 119/60 (10/03/17 1152)  SpO2: 96 % (10/03/17 1152)  BP Location: Left arm    Vital Signs Range (Last 24H):  Temp:  [97.5 °F (36.4 °C)-99.4 °F (37.4 °C)]   Pulse:  []   Resp:  [16-18]   BP: (107-128)/(56-68)   SpO2:  [92 %-99 %]   BP Location: Left arm    Physical Exam   Constitutional: He is oriented to person, place, and time. He appears  well-developed and well-nourished. No distress.   HENT:   Head: Normocephalic and atraumatic.   Eyes: Conjunctivae and EOM are normal.   Neck: Normal range of motion.   Pulmonary/Chest: Effort normal. No stridor. No respiratory distress.   Abdominal: He exhibits no distension. There is no guarding.   Musculoskeletal: Normal range of motion. He exhibits no deformity.   Neurological: He is alert and oriented to person, place, and time. No sensory deficit. Coordination abnormal.   Skin: Skin is warm and dry.   Psychiatric: He has a normal mood and affect. His behavior is normal. Judgment and thought content normal.       Neurological Exam:   LOC: alert and follows requests  Language: No aphasia  Speech: No dysarthria  Orientation: Person, Place, Time  Visual Fields (CN II): Full  EOM (CN III, IV, VI): Full/intact  Tongue (CN XII): to midline  Motor*: Arm Left:  Normal (5/5), Leg Left:   Normal (5/5), Arm Right:   Normal (5/5), Leg Right:   Normal (5/5)  Cerebellar*: Finger/Nose Abnormal - left upper dysmetria, Heel/Shin Abnormal - left lower  Sensation: intact to light touch  Tone: Arm-Left: normal; Leg-Left: normal; Arm-Right: normal; Leg-Right: normal    NIH Stroke Scale:    Level of Consciousness: 0 - alert  LOC Questions: 0 - answers both correctly  LOC Commands: 0 - performs both correctly  Best Gaze: 0 - normal  Visual: 0 - no visual loss  Facial Palsy: 0 - normal  Motor Left Arm: 0 - no drift  Motor Right Arm: 0 - no drift  Motor Left Le - no drift  Motor Right Le - no drift  Limb Ataxia: 2 - present in two limbs  Sensory: 0 - normal  Best Language: 0 - no aphasia  Dysarthria: 0 - normal articulation  Extinction and Inattention: 0 - no neglect  NIH Stroke Scale Total: 2      Laboratory:  CMP:     Recent Labs  Lab 10/03/17  0354   CALCIUM 9.3   ALBUMIN 3.1*   PROT 7.3      K 3.4*   CO2 24      BUN 16   CREATININE 1.0   ALKPHOS 86   ALT 23   AST 28   BILITOT 0.6     CBC:     Recent Labs  Lab  10/03/17  0354   WBC 5.03   RBC 4.71   HGB 13.5*   HCT 41.3      MCV 88   MCH 28.7   MCHC 32.7     Lipid Panel:     Recent Labs  Lab 09/28/17 0208   CHOL 177   LDLCALC 122.2   HDL 41   TRIG 69     Coagulation:     Recent Labs  Lab 09/28/17 0208   INR 1.0   APTT 24.0     Hgb A1C:     Recent Labs  Lab 09/28/17 0208   HGBA1C 4.9     TSH:     Recent Labs  Lab 09/28/17 0208   TSH 0.278*       Diagnostic Results:  I have personally reviewed:    CT Head. Date: 09/27/17  No significant change from prior.  Left cerebellar acute/recent intraparenchymal hemorrhage relatively stable. No evidence for extra-axial extension.  Slight mass effect on the fourth ventricle without hydrocephalus.  Superimposed Age-appropriate generalized cerebral volume loss with mild/moderate degree of patchy decreased attenuation supratentorial white matter suggestive for chronic microvascular ischemic change similarly seen on the prior.   Remote left parasagittal parietal/occipital infarct unchanged with stable bilateral thalamic remote lacunar type infarcts.     MRI Head. 9/28/17  1.  Acute parenchymal hemorrhage involving the paramedian superior left cerebellum with adjacent quan-hemorrhage edema and localized mass effect on the fourth ventricle without evidence of hydrocephalus.  Allowing for intrinsic T1 hyperintensity, no definite abnormal enhancement is identified within this region.  Further evaluation with repeat contrast-enhanced MRI examination can be performed following resolution of acute hemorrhage as clinically warranted.  2.  Findings suggestive of significant chronic microvascular ischemic change, remote infarction of the paramedian left occipital lobe as well as multiple remote lacunar type infarcts in the basal ganglia, thalami and sebastian.  Few small foci of increased diffusivity within the corona radiata without corresponding hypointense signal on ADC maps likely reflecting sequela of prior infarction.     US BLE.  9/27/17  No evidence of deep venous thrombosis bilaterally.  Resolution of prior left popliteal to peroneal thrombus.     Echo. 9/29/17    1 - Concentric remodeling.     2 - Normal left ventricular systolic function (EF 60-65%).     3 - Normal right ventricular systolic function .     4 - Normal left ventricular diastolic function.     5 - No wall motion abnormalities.

## 2017-10-03 NOTE — PLAN OF CARE
11:53 AM   SW informed that patient has been accepted to [facility]. ORESTES contacted Yesy with [employer] to inquire about auth status. ORESTES waiting to hear back.     Sandie Earl LMSW  [facility]  Ext. [phone]

## 2017-10-03 NOTE — CONSULTS
SNF consult approved for admit today will need discharge re-admit Gen SNF orders prior to transfer. Call nurse report to [phone] call physician report to .

## 2017-10-03 NOTE — PT/OT/SLP PROGRESS
"Physical Therapy  Treatment    Otis Salcido   MRN: [id number]   Admitting Diagnosis: Left-sided nontraumatic intracerebral hemorrhage of cerebellum    PT Received On: 10/03/17  PT Start Time: 0935     PT Stop Time: 1000    PT Total Time (min): 25 min       Billable Minutes:  Gait [id number] and Therapeutic Activity 10    Treatment Type: Treatment  PT/PTA: PT     PTA Visit Number: 3       General Precautions: Standard, aspiration, fall  Orthopedic Precautions: N/A   Braces:      Do you have any cultural, spiritual, Anglican conflicts, given your current situation?: none noted    Subjective:  Communicated with RN prior to session.  "I think I am doing fine now.  I can go home.  Everything started the day I came to the hospital."    Pain/Comfort  Pain Rating 1: 0/10  Pain Rating Post-Intervention 1: 0/10    Objective:    Patient found sitting up in chair after eating breakfast. He agreed to therapy.  Patient denied any mobility deficits or functional limitations.  He denied weakness or balance deficits. Sit to stand from chair stand by assist. Gait 150 feet x2 with significant gait and balance deficits present. Step up activity performed to address balance deficits.  Therapist educated patient on balance deficits and need for assistance with mobility to prevent falls.  Therapist answered patient questions regarding possible d/c to SNF.     Functional Mobility:  Gait:    Patient ambulated 150 feet x 2 with CGA > min assist with episodes of LOB.    · Pt demonstrates decreased R step length, decreased speed of RLE during swing, R lean progressing to path deviations and LOB with distractions.    · Pt experienced 2-3 episodes of path deviation or LOB during each gait trial in an environment with minimal distractions.   Therapist provided skilled assistance to maintain balance and skilled verbal cues to improve bilateral weight shift and balance.     Therapeutic Activities and Exercises:  LE exercises to address gait " "deviations:  · R step ups onto 4" step, 2 sets of 5 reps  · R lateral step ups onto 4" step, 2 sets of 5 reps   Patient demonstrated significant difficulty with task initially, but progressed to be able to perform with CGA.  He required excessive time to perform each trial and c/o dizziness throughout activity.     AM-PAC 6 CLICK MOBILITY  How much help from another person does this patient currently need?   1 = Unable, Total/Dependent Assistance  2 = A lot, Maximum/Moderate Assistance  3 = A little, Minimum/Contact Guard/Supervision  4 = None, Modified [place]/Independent    Turning over in bed (including adjusting bedclothes, sheets and blankets)?: 4  Sitting down on and standing up from a chair with arms (e.g., wheelchair, bedside commode, etc.): 4  Moving from lying on back to sitting on the side of the bed?: 4  Moving to and from a bed to a chair (including a wheelchair)?: 3  Need to walk in hospital room?: 3  Climbing 3-5 steps with a railing?: 3  Total Score: 21    AM-PAC Raw Score CMS G-Code Modifier Level of Impairment Assistance   6 % Total / Unable   7 - 9 CM 80 - 100% Maximal Assist   10 - 14 CL 60 - 80% Moderate Assist   15 - 19 CK 40 - 60% Moderate Assist   20 - 22 CJ 20 - 40% Minimal Assist   23 CI 1-20% SBA / CGA   24 CH 0% Independent/ Mod I     Patient left up in chair with all lines intact and call button in reach.    Assessment:  Otis Salcido is a 67 y.o. male with a medical diagnosis of Left-sided nontraumatic intracerebral hemorrhage of cerebellum.  Pt is progressing with therapy, but continues to demonstrate instability during gait with multiple episodes of path deviation or LOB on each gait trial.  Pt also exhibits decreased awareness of deficits including balance.He is not safe to ambulate unassisted at this time.  Pt lives alone and will not have assistance at d/c.  Patient needs additional inpatient therapy to improve balance and safety priro to returning home.      Rehab " identified problem list/impairments: Rehab identified problem list/impairments: gait instability, impaired balance, decreased safety awareness, impaired functional mobilty    Rehab potential is good.    Activity tolerance: Good    Discharge recommendations: Discharge Facility/Level Of Care Needs: rehabilitation facility     Barriers to discharge: Barriers to Discharge: Decreased caregiver support    Equipment recommendations: Equipment Needed After Discharge:  (TBD)     GOALS:    Physical Therapy Goals        Problem: Physical Therapy Goal    Goal Priority Disciplines Outcome Goal Variances Interventions   Physical Therapy Goal     PT/OT, PT Ongoing (interventions implemented as appropriate)     Description:  PT goals until 10/6/17     1. Pt supine to sit with CGA-not met  2. Pt sit to supine with CGA-not met  3. Pt sit to stand with or without RW as needed for safety with CGA-met      Pt sit to stand with stand by assist and no AD- not met  4. Pt to perform gait 100ft with or without RW as needed for safety with CGA.-partially met      Pt to perform gait x 200 feet stand by assist with no AD and no significant path deviation  5. Pt to transfer bed to/from bedside chair with CGA.-met  6. Pt to perform B LE exs in sitting x 20 reps with handout.-not met                       PLAN:    Patient to be seen 5 x/week  to address the above listed problems via gait training, therapeutic activities, therapeutic exercises, neuromuscular re-education  Plan of Care expires: 10/27/17  Plan of Care reviewed with: patient         Monica OLIVIA Echeverria, PT  10/03/2017

## 2017-10-03 NOTE — ASSESSMENT & PLAN NOTE
Possible etiology of ICH  home meds- hydralazine 75 q 8 hrs, amlodipine 10 mg, lisinopril 40   Held PM hydralazine and lisinopril today 2/2 hypotension with BPs in 100's/50's, patient remains asymptomatic

## 2017-10-03 NOTE — DISCHARGE SUMMARY
[facility]  Vascular Neurology  Comprehensive Stroke Center  Discharge Summary     Summary:     Admit Date: 9/27/2017  3:44 AM    Discharge Date and Time:  10/03/2017 2:30 PM    Attending Physician: Jose Antonio Sanchez MD     Discharge Provider: Ame Diallo MD    History of Present Illness: 68 yo M with PMHx of HTN, HLD, previous PEs on rivaroxaban presents to [facility] ED 09/27/17 with sudden onset of headache, vertigo, and nausea with multiple episodes of emesis.  CT head showing ICH approx 2.6 cm.  No falls despite vertigo.  No previous history of stroke.  Symptoms are persistent but have not worsened since onset.  Now on nicardipine gtt for BP control.    Hospital Course (synopsis of major diagnoses, care, treatment, and services provided during the course of the hospital stay): 68 yo M with PMHx of HTN, HLD, previous PEs on rivaroxaban presents to [facility] ED 09/27/17 with sudden onset of headache, vertigo, and nausea with multiple episodes of emesis.  CT head showing ICH approx 2.6 cm.  No falls despite vertigo.  No previous history of stroke.  Symptoms are persistent but have not worsened since onset.  Now on nicardipine gtt for BP control.    9/28/17 patient neurologically stable, off cardene, MRI pending, plans for stepdown  9/29 Pt neurologically stable. Plans for stepdown. Therapy recommending inpatient rehab.  10/2 Dizziness resolved per pt. Still with LUE ataxia. PT recommends rehab, pt's insurance approving SNF  10/3/14 Patient states his left sided ataxia is improving. Walking with PT. Medically ready for discharge and accepted to [facility] today    Modified [place] Scale:   Timeline: At discharge  Modified [place] Score: 3 - moderate disability        NIH Stroke Scale  Level of Consciousness: 0 - alert  LOC Questions: 0 - answers both correctly  LOC Commands: 0 - performs both correctly  Best Gaze: 0 - normal  Visual: 0 - no visual loss  Facial Palsy: 0 - normal  Motor Left Arm: 0 - no  drift  Motor Right Arm: 0 - no drift  Motor Left Le - no drift  Motor Right Le - no drift  Limb Ataxia: 2 - present in two limbs  Sensory: 0 - normal  Best Language: 0 - no aphasia  Dysarthria: 0 - normal articulation  Extinction and Inattention: 0 - no neglect  NIH Stroke Scale Total: 2    Assessment/Plan:     Interventions: None    Complications: None    Neurological deficit at discharge: Ataxia     Disposition: Skilled Nursing Facility    Final Active Diagnoses:    Diagnosis Date Noted POA    PRINCIPAL PROBLEM:  Left-sided nontraumatic intracerebral hemorrhage of cerebellum [I61.4] 2017 Yes    History of pulmonary embolism  [Z86.711] 2017 No    Vasogenic cerebral edema [G93.6] 2017 Yes    History of deep venous thrombosis (DVT) of distal vein of left lower extremity 2016 [Z86.718]  Not Applicable    Essential hypertension [I10] 2013 Yes    High cholesterol [E78.00] 2013 Yes    Prostate cancer s/p treatment, currently under PSA surveillance [C61] 2013 Yes     Chronic    History of blood clots [Z86.718] 2013 Not Applicable      Problems Resolved During this Admission:    Diagnosis Date Noted Date Resolved POA     * Left-sided nontraumatic intracerebral hemorrhage of cerebellum    68 yo M with PMHx HTN, HLD, PEs on rivaroxaban, and prostate cancer presents to [facility] ED 17 after sudden onset of headache, vertigo, and n/v approx 10-11 pm 17. CT head showing 2.6 cm L cerebellar hemorrhage.     Antiplatelets: none, ICH, hold anticoagulation 4 weeks; Spoke with Heme/Onc on whether NOAC is suitable in setting of previous DVT with hx of prostate cancer and potential hypercoagulable state. They will follow outpatient.  Statins: Atorvastatin 40  SBP <160  DVT ppx: SCDs  PT/OT and speech recommending IP Rehab; Insurance approving SNF  Neuro checks q4h        Vasogenic cerebral edema    2/2 infarct  MRI showing edema with mass effect in the 4th  ventricle. No hydrocephalus         History of deep venous thrombosis (DVT) of distal vein of left lower extremity 11/2016    US BLE negative        History of blood clots    PE in 2012  DVT in 2016  Hx of prostate cancer  Will hold anticoagulation for 4 weeks; Will F/U with Heme/Onc as an out patient on whether or not to use NOAC        High cholesterol      Atorvastatin 40 mg po qd        Essential hypertension    Possible etiology of ICH  home meds- hydralazine 75 q 8 hrs, amlodipine 10 mg, lisinopril 40   Held PM hydralazine and lisinopril today 2/2 hypotension with BPs in 100's/50's, patient remains asymptomatic         Prostate cancer s/p treatment, currently under PSA surveillance    Risk factor for increased clotting  Follow-up with urologist and oncologist  Hx PE 2012  Hx DVT 2016  Recommend holding xarelto for at least 4 weeks due to ICH            Recommendations:     Post-discharge complication risks: Falls    Stroke Education given to: patient    Follow Up:  Follow-up Information     Paul Arriaga MD In 7 weeks.    Specialty:  Neurosurgery  Why:  with MRI brain  Contact information:  [address] [place] [id number]  [phone]                 Patient Instructions:     MRI Brain W WO Contrast   Standing Status: Future  Standing Exp. Date: 09/28/18   Order Specific Question Answer Comments   Does the patient have a pacemaker or a defibrilator? No    Does the patient have a cerebral aneurysm or surgical clip, pump, nerve or brain stimulator, middle or inner ear prosthesis, or other metal implant or  been injured by a metal object(i.e. bullet, bb, shrapnel)? No    Is the patient claustrophobic? No    Will the patient require sedation? No    Does the patient have any of the following conditions? Diabetes, History of Renal Disease or Hypertension requiring medical therapy? No    Is this part of a Research Study? No    Recist criteria? No    Does the patient have on a skin patch for  medication with aluminized backing? No        Medications:  Transfer Medications (for Discharge Readmit only):   Current Facility-Administered Medications   Medication Dose Route Frequency Provider Last Rate Last Dose    acetaminophen tablet 650 mg  650 mg Oral Q4H PRN Chico Holugin NP   650 mg at 10/01/17 2340    amlodipine tablet 10 mg  10 mg Oral Daily Chico Holguin NP   10 mg at 10/03/17 0917    atorvastatin tablet 40 mg  40 mg Oral QHS Boo Beltran MD   40 mg at 10/02/17 2157    hydrALAZINE injection 10 mg  10 mg Intravenous Q4H PRN BOUBACAR Prince-C   10 mg at 09/28/17 2306    hydrALAZINE tablet 75 mg  75 mg Oral Q8H BOUBACAR Prince-C   Stopped at 10/03/17 0600    labetalol injection 10 mg  10 mg Intravenous Q4H PRN Zoe Casillas PA-C        levetiracetam tablet 250 mg  250 mg Oral Nightly Elva Anaya PA-C   250 mg at 10/02/17 2157    levetiracetam tablet 500 mg  500 mg Oral QAM Elvasánchez Anaya PA-C   500 mg at 10/03/17 0605    lisinopril tablet 40 mg  40 mg Oral Daily Chico Holguin NP   Stopped at 10/03/17 0900    ondansetron injection 4 mg  4 mg Intravenous Q12H PRN Chico Holguin NP   4 mg at 09/27/17 0958    polyethylene glycol packet 17 g  17 g Oral Daily Chico Holguin NP   17 g at 10/02/17 0852    senna-docusate 8.6-50 mg per tablet 1 tablet  1 tablet Oral BID Chico Holguin NP   1 tablet at 10/02/17 2157    sodium chloride 0.9% flush 3 mL  3 mL Intravenous Q8H Chico Holguin NP   3 mL at 10/03/17 0605       Ame Diallo MD  [facility]  Department of Vascular Neurology   [facility]

## 2017-10-03 NOTE — ASSESSMENT & PLAN NOTE
68 yo M with PMHx HTN, HLD, PEs on rivaroxaban, and prostate cancer presents to [facility] ED 09/27/17 after sudden onset of headache, vertigo, and n/v approx 10-11 pm 09/26/17. CT head showing 2.6 cm L cerebellar hemorrhage.     Antiplatelets: none, ICH, hold anticoagulation 4 weeks; Spoke with Heme/Onc on whether NOAC is suitable in setting of previous DVT with hx of prostate cancer and potential hypercoagulable state. They will follow outpatient.  Statins: Atorvastatin 40  SBP <160  DVT ppx: SCDs  PT/OT and speech recommending IP Rehab; Insurance approving SNF  Neuro checks q4h

## 2017-10-03 NOTE — PROGRESS NOTES
Patient arrived on the unit in a wheelchair propelled per one transporter staff. Zero S/S of or C/O pain or discomfort. Zero SOB, will continue to monitor.

## 2017-10-04 ENCOUNTER — PATIENT OUTREACH (OUTPATIENT)
Dept: ADMINISTRATIVE | Facility: CLINIC | Age: 67
End: 2017-10-04

## 2017-10-04 PROBLEM — R53.81 DEBILITY: Status: ACTIVE | Noted: 2017-10-04

## 2017-10-04 PROCEDURE — 12000000 HC SNF SEMI-PRIVATE ROOM

## 2017-10-04 PROCEDURE — 97802 MEDICAL NUTRITION INDIV IN: CPT | Performed by: NUTRITIONIST

## 2017-10-04 PROCEDURE — 97110 THERAPEUTIC EXERCISES: CPT

## 2017-10-04 PROCEDURE — 99306 1ST NF CARE HIGH MDM 50: CPT | Mod: ,,, | Performed by: HOSPITALIST

## 2017-10-04 PROCEDURE — 25000003 PHARM REV CODE 250: Performed by: STUDENT IN AN ORGANIZED HEALTH CARE EDUCATION/TRAINING PROGRAM

## 2017-10-04 PROCEDURE — 97530 THERAPEUTIC ACTIVITIES: CPT

## 2017-10-04 PROCEDURE — 97116 GAIT TRAINING THERAPY: CPT

## 2017-10-04 PROCEDURE — 97166 OT EVAL MOD COMPLEX 45 MIN: CPT

## 2017-10-04 PROCEDURE — 97535 SELF CARE MNGMENT TRAINING: CPT

## 2017-10-04 RX ADMIN — AMLODIPINE BESYLATE 10 MG: 10 TABLET ORAL at 09:10

## 2017-10-04 RX ADMIN — LEVETIRACETAM 250 MG: 250 TABLET, FILM COATED ORAL at 09:10

## 2017-10-04 RX ADMIN — STANDARDIZED SENNA CONCENTRATE AND DOCUSATE SODIUM 1 TABLET: 8.6; 5 TABLET, FILM COATED ORAL at 09:10

## 2017-10-04 RX ADMIN — HYDRALAZINE HYDROCHLORIDE 75 MG: 50 TABLET ORAL at 01:10

## 2017-10-04 RX ADMIN — HYDRALAZINE HYDROCHLORIDE 75 MG: 50 TABLET ORAL at 05:10

## 2017-10-04 RX ADMIN — HYDRALAZINE HYDROCHLORIDE 75 MG: 50 TABLET ORAL at 09:10

## 2017-10-04 RX ADMIN — ATORVASTATIN CALCIUM 40 MG: 20 TABLET, FILM COATED ORAL at 09:10

## 2017-10-04 RX ADMIN — LEVETIRACETAM 500 MG: 500 TABLET, FILM COATED ORAL at 06:10

## 2017-10-04 RX ADMIN — LISINOPRIL 40 MG: 20 TABLET ORAL at 09:10

## 2017-10-04 NOTE — PLAN OF CARE
Problem: Occupational Therapy Goal  Goal: Occupational Therapy Goal  Goals to be met by: 14 days     Patient will increase functional independence with ADLs by performing:    UE Dressing with Modified [place].  LE Dressing with Modified [place].  Grooming while standing with Modified [place].  Toileting from toilet with Modified [place] for hygiene and clothing management.   Bathing from  shower chair/bench with Modified [place].  Toilet transfer to toilet with Modified [place].  Pt will demonstrate good dynamic standing balance during functional task lasting 10 minutes    Outcome: Ongoing (interventions implemented as appropriate)    Pt was agreeable to OT evaluation.  Goals established to assist pt with returning to PLOF regarding ADLs and func mobility.  Pt will benefit from skilled OT services in order to increase his level of safety and independence with ADLs and mobility.      Key Taylor, OT  10/4/2017

## 2017-10-04 NOTE — CLINICAL REVIEW
Clinical Pharmacy Chart Review Note      Admit Date: 10/3/2017   LOS: 1 day       Otis Salcido is a 67 y.o. male admitted to SNF for PT/OT after hospitalization for left-sided nontraumatic hemorrhage cerebellum.    Active Hospital Problems    Diagnosis  POA    *Nontraumatic intracerebral hemorrhage of cerebellum [I61.4]  Yes    Debility [R53.81]  Yes    History of deep venous thrombosis (DVT) of distal vein of left lower extremity 11/2016 [Z86.718]  Not Applicable    Essential hypertension [I10]  Yes    Prostate cancer s/p treatment, currently under PSA surveillance [C61]  Yes     Chronic    High cholesterol [E78.00]  Yes    History of blood clots [Z86.718]  Not Applicable     lungs        Resolved Hospital Problems    Diagnosis Date Resolved POA   No resolved problems to display.     Review of patient's allergies indicates:   Allergen Reactions    Iodine and iodide containing products Anaphylaxis     Patient Active Problem List    Diagnosis Date Noted    Debility 10/04/2017    Nontraumatic intracerebral hemorrhage of cerebellum 10/03/2017    History of pulmonary embolism 2012 09/30/2017    Vasogenic cerebral edema 09/28/2017    Left-sided nontraumatic intracerebral hemorrhage of cerebellum 09/27/2017    History of deep venous thrombosis (DVT) of distal vein of left lower extremity 11/2016     SVT (supraventricular tachycardia) 04/29/2016    Right lower lobe pneumonia 04/29/2015    Hematuria 12/15/2014    Dysuria 10/08/2014    Hematuria of undiagnosed cause 10/08/2014    Chest pain 04/12/2014    Chest pain, atypical 08/13/2013    Nocturia 08/01/2013    Overactive bladder 04/09/2013    Prostate cancer s/p treatment, currently under PSA surveillance 03/04/2013    Essential hypertension 03/04/2013    High cholesterol 03/04/2013    History of blood clots 03/04/2013    Incontinence of urine 03/04/2013       Scheduled Meds:    amlodipine  10 mg Oral Daily    atorvastatin  40 mg Oral QHS     hydrALAZINE  75 mg Oral Q8H    levetiracetam  250 mg Oral Nightly    levetiracetam  500 mg Oral QAM    lisinopril  40 mg Oral Daily    polyethylene glycol  17 g Oral Daily    senna-docusate 8.6-50 mg  1 tablet Oral BID     Continuous Infusions:    PRN Meds: acetaminophen, calcium carbonate, ramelteon    OBJECTIVE:     Vital Signs (Last 24H)  Temp:  [97.4 °F (36.3 °C)-98 °F (36.7 °C)]   Pulse:  [84-87]   Resp:  [18-20]   BP: (119-142)/(60-80)   SpO2:  [96 %-99 %]     Laboratory:  CBC:   Recent Labs  Lab 10/01/17  0407 10/02/17  0431 10/03/17  0354   WBC 5.75 4.51 5.03   RBC 4.93 4.79 4.71   HGB 14.1 13.7* 13.5*   HCT 43.5 42.0 41.3    208 202   MCV 88 88 88   MCH 28.6 28.6 28.7   MCHC 32.4 32.6 32.7     BMP:   Recent Labs  Lab 10/01/17  0407 10/02/17  0431 10/03/17  0354   GLU 92 87 98    138 138   K 3.4* 3.5 3.4*    107 105   CO2 23 24 24   BUN 11 10 16   CREATININE 0.9 0.9 1.0   CALCIUM 9.0 9.2 9.3   MG 2.0 1.9 2.0     CMP:   Recent Labs  Lab 10/01/17  0407 10/02/17  0431 10/03/17  0354   GLU 92 87 98   CALCIUM 9.0 9.2 9.3   ALBUMIN 3.0* 3.0* 3.1*   PROT 7.1 7.0 7.3    138 138   K 3.4* 3.5 3.4*   CO2 23 24 24    107 105   BUN 11 10 16   CREATININE 0.9 0.9 1.0   ALKPHOS 91 81 86   ALT 14 16 23   AST 16 17 28   BILITOT 0.6 0.8 0.6     LFTs:   Recent Labs  Lab 10/01/17  0407 10/02/17  0431 10/03/17  0354   ALT 14 16 23   AST 16 17 28   ALKPHOS 91 81 86   BILITOT 0.6 0.8 0.6   PROT 7.1 7.0 7.3   ALBUMIN 3.0* 3.0* 3.1*     Coagulation:   Recent Labs  Lab 09/28/17  0208   INR 1.0   APTT 24.0     Cardiac markers: No results for input(s): CKMB, TROPONINT, MYOGLOBIN in the last 168 hours.  ABGs: No results for input(s): PH, PCO2, PO2, HCO3, POCSATURATED, BE in the last 168 hours.  Microbiology Results (last 7 days)     ** No results found for the last 168 hours. **        Specimen (12h ago through future)    None        No results for input(s): COLORU, CLARITYU, SPECGRAV, PHUR,  PROTEINUA, GLUCOSEU, BILIRUBINCON, BLOODU, WBCU, RBCU, BACTERIA, MUCUS, NITRITE, LEUKOCYTESUR, UROBILINOGEN, HYALINECASTS in the last 168 hours.      ASSESSMENT/PLAN:     Active Hospital Problems    Diagnosis    *Nontraumatic intracerebral hemorrhage of cerebellum   --Levetiracetam 500 mg q AM, 250 mg q PM (for sz prophylaxis)    Debility   --PT/OT: APAP 650 mg q4h prn mild pain  --bowel regimen for constipation; hold for loose or frequent stools: Miralax daily; Senna-Docusate twice daily   --DVT prophylaxis: ILANA, SCD      History of deep venous thrombosis (DVT) of distal vein of left lower extremity   --Holding anticoagulation for now until cleared by neurosurgery     Essential hypertension   --Amlodipine 10 mg daily  --Hydralazine 75 mg q8h  --Lisinopril 40 mg daily  Pulse:  [84-87]   BP: (119-142)/(60-80)   BMP  Lab Results   Component Value Date     10/03/2017    K 3.4 (L) 10/03/2017     10/03/2017    CO2 24 10/03/2017    BUN 16 10/03/2017    CREATININE 1.0 10/03/2017    CALCIUM 9.3 10/03/2017    ANIONGAP 9 10/03/2017    ESTGFRAFRICA >60.0 10/03/2017    EGFRNONAA >60.0 10/03/2017         Prostate cancer s/p treatment, currently under PSA surveillance   --No medication orders at this time, to be followed up with urology.    High cholesterol   --Atorvastatin 40 mg daily  Lab Results   Component Value Date    CHOL 177 09/28/2017    CHOL 173 04/12/2014    CHOL 238 (H) 03/26/2007     Lab Results   Component Value Date    HDL 41 09/28/2017    HDL 36 (L) 04/12/2014    HDL 44.0 03/26/2007     Lab Results   Component Value Date    LDLCALC 122.2 09/28/2017    LDLCALC 119.0 04/12/2014    LDLCALC 167.0 (H) 03/26/2007     Lab Results   Component Value Date    TRIG 69 09/28/2017    TRIG 90 04/12/2014    TRIG 135 03/26/2007     Lab Results   Component Value Date    CHOLHDL 23.2 09/28/2017    CHOLHDL 20.8 04/12/2014    CHOLHDL 18.5 (L) 03/26/2007

## 2017-10-04 NOTE — PT/OT/SLP EVAL
"PhysicalTherapy   Evaluation and treatment    Otis Salcido   MRN: [id number]     PT Received On: 10/04/17  PT Start Time: 1000     PT Stop Time: 1100    PT Total Time (min): 60 min       Billable Minutes:  Evaluation 1, Gait [id number] and Therapeutic Activity 23= E + 53    Diagnosis: Nontraumatic intracerebral hemorrhage of cerebellum  Past Medical History:   Diagnosis Date    Allergy     Colon polyp     benign    Elevated PSA     Glaucoma     High cholesterol     History of blood clots     lungs    Hypertension     Kidney stone     Prostate cancer       Past Surgical History:   Procedure Laterality Date    COLON SURGERY      CYSTOSCOPY      KIDNEY STONE SURGERY      LITHOTRIPSY      NECK SURGERY      PROSTATE SURGERY           General Precautions: Standard, aspiration, fall  Orthopedic Precautions: N/A   Braces: N/A    Do you have any cultural, spiritual, Latter day conflicts, given your current situation?: no    Patient History:  Lives With: alone  Living Arrangements: apartment  Home Layout: Able to live on 1st floor  Transportation Available: family or friend will provide  Living Environment Comment: Pt lives alone in apartment building in [place]. He has a son, daughter, and uncle nearby that can assist him as needed/visit daily.  Pt is (I), drives. Retired from working at a [employer].   Equipment Currently Used at Home: walker, rolling, bath bench, bedside commode  DME owned (not currently used): rolling walker, bedside commode and transfer tub bench    Previous Level of Function:  Ambulation Skills: independent  Transfer Skills: independent  ADL Skills: independent  Work/Leisure Activity: independent    Subjective:  Communicated with pt prior to session.  Pt reported that there was nothing wrong with him prior to coming to the hospital. "I don't know when all this started."  "My brain is still affected."- per pt report about his memory.  Chief Complaint: Inability to ambulate " "safely.  Patient goals: "To walk normal."    Pain/Comfort  Pain Rating 1: 0/10    Objective:  Patient found seated in bedside chair.     Cognitive Exam:  Oriented to: Person, Place, Time and Situation  Follows Commands/attention: Follows multistep  commands  Communication: clear/fluent  Safety awareness/insight to disability: impaired per OT reporting that pt asked if he could get up on his own even if he knows he can do it.  White board updated with pt's physical capability (ambulate to restroom with contact guard assistance using a rolling walker).    Physical Exam:  Postural examination/scapula alignment: Rounded shoulder and Head forward    Skin integrity: Visible skin intact  Edema: None noted     Sensation:   Intact    Upper Extremity Range of Motion:  Right Upper Extremity: WNL  Left Upper Extremity: WNL    Upper Extremity Strength:  Right Upper Extremity: WNL  Left Upper Extremity: WNL    Lower Extremity Range of Motion:  Right Lower Extremity: WNL  Left Lower Extremity: WNL    Lower Extremity Strength:  Right Lower Extremity: WNL  Left Lower Extremity: WNL     Fine motor coordination:  Impaired- slow movement of fingers via thumb to finger opposition (performed better on R vs. L hand).    Gross motor coordination: impaired- slight ataxia noted with ambulation and mobility    Functional Status:  MDS G  Bed Mobility Functional Status: S-SBA  Transfer Functional Status: S-SBA  Walk in Room Functional Status: CGA-Min (A)  Walk in Corridor Functional Status: CGA-Min (A)  Locomotion on Unit Functional Status: CGA-Min (A)  Locomotion Off Unit Functional Status: CGA-Min (A)  Eval Only: Number of L/E limb <4/5 MMT: 1    Mat Mobility:  Sit>Supine:supervision  Supine>Sit: supervision    Transfers:  Sit<>Stand: SBA from wheelchair, edge of mat, bedside chair  Stand Pivot Transfer: SBA wheelchair<>bedside chair/mat    Gait:  Ambulated 50 feet with use of PVC pipe ladder for coordination purposes with SBA-CGA. Pt was " educated on avoiding a wide base of support.     Ambulated 150 feet x 2 trials with a rest break in between.  He ambulated without a rolling walker (contact guard assistance) and with a rolling walker (stand-by assistance to contact guard assistance).  He presents with slight ataxia and imbalance with a narrow base of support during each gait trial.      Advanced Gait:  Stairs: 4 steps with stand-by assistance with bilateral handrails.    Additional Treatment:  Coordination activity with PVC pipe ladder (seated on mat)- movement of arms on stable body,  movement of body on stable arms, and combination with opposition (movement of trunk and arms) x10 reps each.   Presentation: Pt did well with each activity demonstrating internal rotation of his LUE and slower movement of his upper extremities during movement of arms on stable body.    Patient left up in chair with family member present, call button in reach.   White board updated. Pt was educated on safety with calling for assistance prior to getting out of his bed or chair and use of rolling walker at this time to prevent falls.    Assessment:  Otis Salcido is a 67 y.o. male with a medical diagnosis of Nontraumatic intracerebral hemorrhage of cerebellum. Mr. Salcido presents with the following deficits: impaired coordination as it relates to ambulation and movement of his arms/legs/trunk simultaneously, posing a fall risk.  In addition, his cognition is impaired, per pt report, via processing at this time.  His personal factors that will affect his POC include: living alone and possible limitation of care upon return home.  His presentation is evolving, thus his evaluation is labeled moderate complexity.    Rehab identified problem list/impairments: weakness, impaired endurance, impaired self care skills, impaired functional mobilty, gait instability, decreased coordination, decreased upper extremity function, decreased lower extremity function, decreased safety  awareness, impaired fine motor    Rehab potential is fair.    Activity tolerance: Fair    Discharge recommendations: home health PT     Barriers to discharge: Decreased caregiver support (lives alone)    Equipment recommendations: none     GOALS:    Physical Therapy Goals        Problem: Physical Therapy Goal    Goal Priority Disciplines Outcome Goal Variances Interventions   Physical Therapy Goal     PT/OT, PT Ongoing (interventions implemented as appropriate)     Description:  Goals to be met by: 2 weeks     Patient will increase functional independence with mobility by performin. Supine to sit with [place].  2. Sit to supine with [place].  3. Sit to stand transfer with [place].  4. Bed to chair transfer with [place].  5. Gait  x 300 feet with Modified [place] with/without an assistive device.  6. Ascend/descend 1 flight of stairs with right Handrails modified independence.  7. Stand for 5 minutes with modified independence while performing a task (playing ladder ball, throwing beach ball).  8. Lower extremity exercise program x 20 standing exercises with unilateral UE support (hip flexion, hip abduction, heel raises).   9. Pt will be able to ambulate in all directions x 20 feet to display the ability to change direction of ambulation without loss of balance (supervision).                    PLAN:    Patient to be seen  (5-6x/wk)  to address the above listed problems via gait training, therapeutic activities, therapeutic exercises, neuromuscular re-education  Plan of Care Expires: 17    Frances Mcfarland, PT 10/4/2017

## 2017-10-04 NOTE — PLAN OF CARE
Problem: Patient Care Overview  Goal: Plan of Care Review  Outcome: Ongoing (interventions implemented as appropriate)  Recommendations    Recommendation/Intervention:  1. Continue cardiac diet  2. Rec boost vanilla with all meals  3. RD to monitor  Goals: > 85% of EEN/EPN  Nutrition Goal Status: new    Assessment and Plan     Inadequate energy intake related to decreased appetite as evidence by 25% PO intake.

## 2017-10-04 NOTE — PLAN OF CARE
Problem: Fall Risk (Adult)  Goal: Absence of Falls  Patient will demonstrate the desired outcomes by discharge/transition of care.   Outcome: Ongoing (interventions implemented as appropriate)   10/04/17 [id number]   Fall Risk (Adult)   Absence of Falls making progress toward outcome

## 2017-10-04 NOTE — HPI
Chief Complaint/Reason for Admission: Debility    History of Present Illness:  Patient is a 67 y.o. male who has a past medical history of Glaucoma; High cholesterol; History of blood clots (on Xarelto); Hypertension; Kidney stone; and Prostate cancer s/p resection presented with headache and found to have left-sided nontraumatic intracerebral hemorrhage of cerebellum. Neurosurgery and vascular neurology were consulted and recommended non surgical management. Etiology of ICH likely hypertensive vs ischemic stroke with hemorrhagic transformation while on rivaroxaban. Patient has been working with PT/OT who recommend inpatient rehab for further balance/mobility training however insurance denied admission. Patient now being admitted to SNF for rehab. Patient lives in apartment alone and he was independent with ADLs prior to admission. Patient currently denies any fever/chills, chest pain, dyspnea, abdominal pain, nausea/vomiting.

## 2017-10-04 NOTE — CONSULTS
"  [facility]  Adult Nutrition  Consult Note    SUMMARY     Recommendations    Recommendation/Intervention:  1. Continue cardiac diet  2. Rec boost vanilla with all meals  3. RD to monitor  Goals: > 85% of EEN/EPN  Nutrition Goal Status: new     Reason for Assessment    Reason for Assessment: physician consult  Diagnosis:  (Neutramatic intracerebral hemmorrhage of cerebellum)  Relevent Medical History: HTN,HTL, prostate cancer   General Information Comments: Pt states no issues chewing or swallowing. Pt states decreased appetite. Interested in trying boost.   Nutrition Discharge Planning: DC on Cardiac diet    Nutrition Prescription Ordered    Current Diet Order: Cardiac    Evaluation of Received Nutrients/Fluid Intake    Energy Calories Required: not meeting needs  Protein Required: not meeting needs  Fluid Required: meeting needs   % Intake of Estimated Energy Needs: 25 - 50 %  % Meal Intake: 25%     Nutrition Risk Screen     Nutrition Risk Screen: no indicators present    Nutrition/Diet History    Patient Reported Diet/Restrictions/Preferences: general   Food Preferences: No Scientology or cultural food preferences     Labs/Tests/Procedures/Meds      Pertinent Labs Reviewed: reviewed, pertinent  Pertinent Labs Comments: potassium 3.4, albumin 3.1  Pertinent Medications Reviewed: reviewed  Pertinent Medications Comments: statin, losinopril, senna docusate    Physical Findings    Skin: intact    Anthropometrics    Height: 5' 7.01" (170.2 cm)  Weight Method: Standard Scale  Weight: 74.3 kg (163 lb 12.8 oz)  Ideal Body Weight (IBW), Male: 148.06 lb   % Ideal Body Weight, Male (lb): 110.63 lb   BMI (Calculated): 25.7  BMI Grade: 25 - 29.9 - overweight    Estimated/Assessed Needs    Weight Used For Calorie Calculations: 74.3 kg (163 lb 12.8 oz)   Height (cm): 170.2 cm  Energy Calorie Requirements (kcal): 1833kcal/day (mifflin x 1.25(PAL))  Energy Need Method: Pride-St Jeor  RMR (Pride-St. Jeor " Equation): 1466.75  Protein Requirements: 82-99 grams protein/ day (1.0-1.2 gm/kg)  Fluid Requirements (mL): Or per MD  Fluid Need Method: RDA Method  RDA Method (mL): 1833     Assessment and Plan    Inadequate energy intake related to decreased appetite as evidence by 25% PO intake.     Monitor and Evaluation    Food and Nutrient Intake: food and beverage intake  Physical Activity and Function: nutrition-related ADLs and IADLs  Anthropometric Measurements: weight, weight change  Biochemical Data, Medical Tests and Procedures: electrolyte and renal panel, gastrointestinal profile, glucose/endocrine profile, inflammatory profile      Nutrition Follow-Up  Yes

## 2017-10-04 NOTE — H&P
[facility]  Department of Hospital Medicine  History & Physical    Patient Name: Otis Salcido  MRN: [id number]  Code Status: Full Code  Admission Date: 10/3/2017  Attending Physician: Mima Vázquez MD   Primary Care Provider: Primary Doctor No    Subjective:     Principal Problem:Nontraumatic intracerebral hemorrhage of cerebellum    No chief complaint on file.       HPI: Chief Complaint/Reason for Admission: Debility    History of Present Illness:  Patient is a 67 y.o. male who has a past medical history of Glaucoma; High cholesterol; History of blood clots (on Xarelto); Hypertension; Kidney stone; and Prostate cancer s/p resection presented with headache and found to have left-sided nontraumatic intracerebral hemorrhage of cerebellum. Neurosurgery and vascular neurology were consulted and recommended non surgical management. Etiology of ICH likely hypertensive vs ischemic stroke with hemorrhagic transformation while on rivaroxaban. Patient has been working with PT/OT who recommend inpatient rehab for further balance/mobility training however insurance denied admission. Patient now being admitted to SNF for rehab. Patient lives in apartment alone and he was independent with ADLs prior to admission. Patient currently denies any fever/chills, chest pain, dyspnea, abdominal pain, nausea/vomiting.         Past Medical History:   Diagnosis Date    Allergy     Colon polyp     benign    Elevated PSA     Glaucoma     High cholesterol     History of blood clots     lungs    Hypertension     Kidney stone     Prostate cancer        Past Surgical History:   Procedure Laterality Date    COLON SURGERY      CYSTOSCOPY      KIDNEY STONE SURGERY      LITHOTRIPSY      NECK SURGERY      PROSTATE SURGERY         Review of patient's allergies indicates:   Allergen Reactions    Iodine and iodide containing products Anaphylaxis       Current Facility-Administered Medications on File Prior to Encounter    Medication    [COMPLETED] potassium chloride SA CR tablet 40 mEq    [DISCONTINUED] acetaminophen tablet 650 mg    [DISCONTINUED] amlodipine tablet 10 mg    [DISCONTINUED] atorvastatin tablet 40 mg    [DISCONTINUED] hydrALAZINE injection 10 mg    [DISCONTINUED] hydrALAZINE tablet 75 mg    [DISCONTINUED] labetalol injection 10 mg    [DISCONTINUED] levetiracetam tablet 250 mg    [DISCONTINUED] levetiracetam tablet 500 mg    [DISCONTINUED] lisinopril tablet 40 mg    [DISCONTINUED] ondansetron injection 4 mg    [DISCONTINUED] polyethylene glycol packet 17 g    [DISCONTINUED] senna-docusate 8.6-50 mg per tablet 1 tablet    [DISCONTINUED] sodium chloride 0.9% flush 3 mL     Current Outpatient Prescriptions on File Prior to Encounter   Medication Sig    amlodipine (NORVASC) 10 MG tablet Take 1 tablet (10 mg total) by mouth once daily.    aspirin (ECOTRIN) 81 MG EC tablet Take 81 mg by mouth once daily.    lisinopril (PRINIVIL,ZESTRIL) 40 MG tablet TK 1 T PO  D    NASONEX 50 mcg/actuation nasal spray     sildenafil (VIAGRA) 100 MG tablet Take 1 tablet (100 mg total) by mouth daily as needed for Erectile Dysfunction.    XARELTO 15 mg Tab TK 1 T PO  QPM     Family History     None        Social History Main Topics    Smoking status: Current Every Day Smoker     Packs/day: 1.00     Years: 40.00     Types: Cigarettes    Smokeless tobacco: Never Used    Alcohol use Yes      Comment: social    Drug use: No    Sexual activity: Yes     Partners: Female     Review of Systems   Constitutional: Negative for chills, fatigue and fever.   HENT: Negative for congestion, facial swelling, hearing loss and trouble swallowing.    Eyes: Negative for photophobia and visual disturbance.   Respiratory: Negative for chest tightness, shortness of breath and wheezing.    Cardiovascular: Negative for chest pain, palpitations and leg swelling.   Gastrointestinal: Negative for abdominal pain, blood in stool, constipation,  diarrhea, nausea and vomiting.   Endocrine: Negative.    Genitourinary: Negative.    Musculoskeletal: Negative for back pain, joint swelling and myalgias.   Skin: Negative.    Allergic/Immunologic: Negative.    Neurological: Negative for dizziness, facial asymmetry, speech difficulty, weakness and numbness.   Hematological: Negative.    Psychiatric/Behavioral: Negative for agitation, confusion and dysphoric mood. The patient is not nervous/anxious.      Objective:     Vital Signs (Most Recent):  Temp: 98 °F (36.7 °C) (10/03/17 1910)  Pulse: 84 (10/03/17 1910)  Resp: 20 (10/03/17 1910)  BP: 126/70 (10/04/17 0530)  SpO2: 99 % (10/03/17 1910) Vital Signs (24h Range):  Temp:  [97.4 °F (36.3 °C)-98 °F (36.7 °C)] 98 °F (36.7 °C)  Pulse:  [84-87] 84  Resp:  [18-20] 20  SpO2:  [96 %-99 %] 99 %  BP: (119-142)/(60-80) 126/70     Weight: 74.3 kg (163 lb 12.8 oz)  Body mass index is 25.66 kg/m².    Physical Exam   Constitutional: He is oriented to person, place, and time. Vital signs are normal. He appears well-developed and well-nourished.  Non-toxic appearance. He does not appear ill. No distress.   HENT:   Head: Normocephalic and atraumatic.   Eyes: Conjunctivae and EOM are normal. Pupils are equal, round, and reactive to light. No scleral icterus.   Neck: Normal range of motion and full passive range of motion without pain. Neck supple. No JVD present. Carotid bruit is not present. No thyromegaly present.   Cardiovascular: Normal rate, regular rhythm, S1 normal, S2 normal, normal heart sounds and normal pulses.  Exam reveals no gallop, no S3, no S4 and no friction rub.    No murmur heard.  Pulmonary/Chest: Effort normal and breath sounds normal. No accessory muscle usage. No tachypnea. No respiratory distress. He has no decreased breath sounds. He has no wheezes. He has no rhonchi. He has no rales.   Abdominal: Soft. Normal appearance and bowel sounds are normal. He exhibits no shifting dullness, no distension, no abdominal  bruit and no ascites. There is no hepatosplenomegaly. There is no tenderness. There is no rigidity and no guarding.   Musculoskeletal: Normal range of motion. He exhibits no edema or tenderness.   Neurological: He is alert and oriented to person, place, and time. He has normal strength. He is not disoriented. No cranial nerve deficit or sensory deficit. GCS eye subscore is 4. GCS verbal subscore is 5. GCS motor subscore is 6.   Skin: Skin is warm, dry and intact. No abrasion and no lesion noted.   Psychiatric: He has a normal mood and affect. His behavior is normal. Judgment and thought content normal. His mood appears not anxious. His speech is not slurred. He is not actively hallucinating. Cognition and memory are normal. He does not exhibit a depressed mood. He is attentive.       Significant Labs: All pertinent labs within the past 24 hours have been reviewed.    Significant Imaging: I have reviewed all pertinent imaging results/findings within the past 24 hours.    Assessment/Plan:     * Nontraumatic intracerebral hemorrhage of cerebellum    Neurologically stable  Cont neuro checks  Cont to hold anticoagulation  Continue Keppra for seizure prophylaxis   Cont with PT/OT for gait training and strengthening and restoration of ADL's   Cont DVT prophylaxis with TEDs/SCD  Follow up in neurosurgery clinic in 8 weeks with MRI brain with Dr. Hall  Future Appointments  Date Time Provider Department Center   11/6/2017 12:45 PM [facility] Luisito Romano   11/6/2017 2:00 PM Paul Arriaga MD [facility] NEUROS7 Luis[facility] Romano                 Debility    Cont PT/OT as above.         History of deep venous thrombosis (DVT) of distal vein of left lower extremity 11/2016    Plan as above.         History of blood clots    Holding anticoagulation for now until cleared by neurosurgery.         High cholesterol    Cont home atorvastatin to treat.         Essential hypertension    BP well controlled  Cont hydralazine, amlodipine and  lisinopril to treat.   Cont to monitor and adjust as needed.         Prostate cancer s/p treatment, currently under PSA surveillance    No acute issues  Follow up outpatient with urology            Mima Sanchez MD  Department of Hospital Medicine  [facility]

## 2017-10-04 NOTE — ASSESSMENT & PLAN NOTE
Neurologically stable  Cont neuro checks  Cont to hold anticoagulation  Continue Keppra for seizure prophylaxis   Cont with PT/OT for gait training and strengthening and restoration of ADL's   Cont DVT prophylaxis with TEDs/SCD  Follow up in neurosurgery clinic in 8 weeks with MRI brain with Dr. Hall  Future Appointments  Date Time Provider Department Center   11/6/2017 12:45 PM [facility]4 [facility] Luis[facility] Romano   11/6/2017 2:00 PM Paul Arriaga MD [facility] NEUROS7 Luisito taisha

## 2017-10-04 NOTE — PLAN OF CARE
Problem: Physical Therapy Goal  Goal: Physical Therapy Goal  Goals to be met by: 2 weeks     Patient will increase functional independence with mobility by performin. Supine to sit with [place].  2. Sit to supine with [place].  3. Sit to stand transfer with [place].  4. Bed to chair transfer with [place].  5. Gait  x 300 feet with Modified [place] with/without an assistive device.  6. Ascend/descend 1 flight of stairs with right Handrails modified independence.  7. Stand for 5 minutes with modified independence while performing a task (playing ladder ball, throwing beach ball).  8. Lower extremity exercise program x 20 standing exercises with unilateral UE support (hip flexion, hip abduction, heel raises).   9. Pt will be able to ambulate in all directions x 20 feet to display the ability to change direction of ambulation without loss of balance (supervision).  Outcome: Ongoing (interventions implemented as appropriate)  Goals set today based on evaluation.

## 2017-10-04 NOTE — SUBJECTIVE & OBJECTIVE
Past Medical History:   Diagnosis Date    Allergy     Colon polyp     benign    Elevated PSA     Glaucoma     High cholesterol     History of blood clots     lungs    Hypertension     Kidney stone     Prostate cancer        Past Surgical History:   Procedure Laterality Date    COLON SURGERY      CYSTOSCOPY      KIDNEY STONE SURGERY      LITHOTRIPSY      NECK SURGERY      PROSTATE SURGERY         Review of patient's allergies indicates:   Allergen Reactions    Iodine and iodide containing products Anaphylaxis       Current Facility-Administered Medications on File Prior to Encounter   Medication    [COMPLETED] potassium chloride SA CR tablet 40 mEq    [DISCONTINUED] acetaminophen tablet 650 mg    [DISCONTINUED] amlodipine tablet 10 mg    [DISCONTINUED] atorvastatin tablet 40 mg    [DISCONTINUED] hydrALAZINE injection 10 mg    [DISCONTINUED] hydrALAZINE tablet 75 mg    [DISCONTINUED] labetalol injection 10 mg    [DISCONTINUED] levetiracetam tablet 250 mg    [DISCONTINUED] levetiracetam tablet 500 mg    [DISCONTINUED] lisinopril tablet 40 mg    [DISCONTINUED] ondansetron injection 4 mg    [DISCONTINUED] polyethylene glycol packet 17 g    [DISCONTINUED] senna-docusate 8.6-50 mg per tablet 1 tablet    [DISCONTINUED] sodium chloride 0.9% flush 3 mL     Current Outpatient Prescriptions on File Prior to Encounter   Medication Sig    amlodipine (NORVASC) 10 MG tablet Take 1 tablet (10 mg total) by mouth once daily.    aspirin (ECOTRIN) 81 MG EC tablet Take 81 mg by mouth once daily.    lisinopril (PRINIVIL,ZESTRIL) 40 MG tablet TK 1 T PO  D    NASONEX 50 mcg/actuation nasal spray     sildenafil (VIAGRA) 100 MG tablet Take 1 tablet (100 mg total) by mouth daily as needed for Erectile Dysfunction.    XARELTO 15 mg Tab TK 1 T PO  QPM     Family History     None        Social History Main Topics    Smoking status: Current Every Day Smoker     Packs/day: 1.00     Years: 40.00     Types:  Cigarettes    Smokeless tobacco: Never Used    Alcohol use Yes      Comment: social    Drug use: No    Sexual activity: Yes     Partners: Female     Review of Systems   Constitutional: Negative for chills, fatigue and fever.   HENT: Negative for congestion, facial swelling, hearing loss and trouble swallowing.    Eyes: Negative for photophobia and visual disturbance.   Respiratory: Negative for chest tightness, shortness of breath and wheezing.    Cardiovascular: Negative for chest pain, palpitations and leg swelling.   Gastrointestinal: Negative for abdominal pain, blood in stool, constipation, diarrhea, nausea and vomiting.   Endocrine: Negative.    Genitourinary: Negative.    Musculoskeletal: Negative for back pain, joint swelling and myalgias.   Skin: Negative.    Allergic/Immunologic: Negative.    Neurological: Negative for dizziness, facial asymmetry, speech difficulty, weakness and numbness.   Hematological: Negative.    Psychiatric/Behavioral: Negative for agitation, confusion and dysphoric mood. The patient is not nervous/anxious.      Objective:     Vital Signs (Most Recent):  Temp: 98 °F (36.7 °C) (10/03/17 1910)  Pulse: 84 (10/03/17 1910)  Resp: 20 (10/03/17 1910)  BP: 126/70 (10/04/17 0530)  SpO2: 99 % (10/03/17 1910) Vital Signs (24h Range):  Temp:  [97.4 °F (36.3 °C)-98 °F (36.7 °C)] 98 °F (36.7 °C)  Pulse:  [84-87] 84  Resp:  [18-20] 20  SpO2:  [96 %-99 %] 99 %  BP: (119-142)/(60-80) 126/70     Weight: 74.3 kg (163 lb 12.8 oz)  Body mass index is 25.66 kg/m².    Physical Exam   Constitutional: He is oriented to person, place, and time. Vital signs are normal. He appears well-developed and well-nourished.  Non-toxic appearance. He does not appear ill. No distress.   HENT:   Head: Normocephalic and atraumatic.   Eyes: Conjunctivae and EOM are normal. Pupils are equal, round, and reactive to light. No scleral icterus.   Neck: Normal range of motion and full passive range of motion without pain. Neck  supple. No JVD present. Carotid bruit is not present. No thyromegaly present.   Cardiovascular: Normal rate, regular rhythm, S1 normal, S2 normal, normal heart sounds and normal pulses.  Exam reveals no gallop, no S3, no S4 and no friction rub.    No murmur heard.  Pulmonary/Chest: Effort normal and breath sounds normal. No accessory muscle usage. No tachypnea. No respiratory distress. He has no decreased breath sounds. He has no wheezes. He has no rhonchi. He has no rales.   Abdominal: Soft. Normal appearance and bowel sounds are normal. He exhibits no shifting dullness, no distension, no abdominal bruit and no ascites. There is no hepatosplenomegaly. There is no tenderness. There is no rigidity and no guarding.   Musculoskeletal: Normal range of motion. He exhibits no edema or tenderness.   Neurological: He is alert and oriented to person, place, and time. He has normal strength. He is not disoriented. No cranial nerve deficit or sensory deficit. GCS eye subscore is 4. GCS verbal subscore is 5. GCS motor subscore is 6.   Skin: Skin is warm, dry and intact. No abrasion and no lesion noted.   Psychiatric: He has a normal mood and affect. His behavior is normal. Judgment and thought content normal. His mood appears not anxious. His speech is not slurred. He is not actively hallucinating. Cognition and memory are normal. He does not exhibit a depressed mood. He is attentive.       Significant Labs: All pertinent labs within the past 24 hours have been reviewed.    Significant Imaging: I have reviewed all pertinent imaging results/findings within the past 24 hours.

## 2017-10-04 NOTE — PT/OT/SLP EVAL
Occupational Therapy  Evaluation & Treatment    Otis Salcido   MRN: [id number]   Admitting Diagnosis: Nontraumatic intracerebral hemorrhage of cerebellum     OT Date of Treatment: 10/04/17   OT Start Time: 0805  OT Stop Time: 0900  OT Total Time (min): 55 min    Billable Minutes:  Evaluation 15  Self Care/Home Management 30  Therapeutic Activity 10    Diagnosis: Nontraumatic intracerebral hemorrhage of cerebellum    Past Medical History:   Diagnosis Date    Allergy     Colon polyp     benign    Elevated PSA     Glaucoma     High cholesterol     History of blood clots     lungs    Hypertension     Kidney stone     Prostate cancer       Past Surgical History:   Procedure Laterality Date    COLON SURGERY      CYSTOSCOPY      KIDNEY STONE SURGERY      LITHOTRIPSY      NECK SURGERY      PROSTATE SURGERY           General Precautions: Standard, aspiration, fall  Orthopedic Precautions: N/A  Braces: N/A          Patient History:  Lives With: alone  Living Arrangements: apartment  Home Accessibility:  (first floor apt)  Home Layout:  (tub/shower combo)  Transportation Available: family or friend will provide  Living Environment Comment:  (Pt lives on 1st floor apt - PLOF was I without AD for ADLs or func mobility - pt has girlfriend but she has her own home - pt has limited assist/supervision at discharge)  Equipment Currently Used at Home: none    Prior level of function:   Bed Mobility/Transfers: independent  Grooming: independent  Bathing: independent  Upper Body Dressing: independent  Lower Body Dressing: independent  Toileting: independent  Home Management Skills: independent  Homemaking Responsibilities: Yes  Meal Prep Responsibility: Primary  Laundry Responsibility: Primary  Cleaning Responsibility: Primary  Bill Paying/Finance Responsibility: Primary  Shopping Responsibility: Primary  Driving License: Yes  Mode of Transportation: Car  Occupation: Retired  Type of Occupation:   Leisure and  "Hobbies: watcing sports     Dominant hand: right    Subjective:  Communicated with nurse prior to session.  "Even if I feel like I'm steady enough to do it?'  Pt reply when told he needs to ask for help before going to the bathroom again to make sure that he doesn't fall  Chief Complaint: L leg weakness  Patient/Family stated goals: get stronger and walk better    Pain/Comfort  Pain Rating 1: 0/10  Pain Rating Post-Intervention 1: 0/10    Objective:   Patient found with:  (no lines - sleeping in bed)    Cognitive Exam:  Oriented to: Person, Place, Time and Situation  Follows Commands/attention: Follows two-step commands  Communication: clear/fluent and delayed response at times  Memory:  No Deficits noted  Safety awareness/insight to disability: impaired - has gotten up to use the bathroom without assist  Coping skills/emotional control: Appropriate to situation    Visual/perceptual:  Intact    Physical Exam:  Postural examination/scapula alignment: Rounded shoulder and L shoulder lower  Skin integrity: Visible skin intact  Edema: None noted in UEs    Sensation:   Decreased in L UE and L LE    Upper Extremity Range of Motion:  Right Upper Extremity: WFL  Left Upper Extremity: WFL    Upper Extremity Strength:  Right Upper Extremity: WFL  Left Upper Extremity: WFL   Strength: good    Fine motor coordination:   Impaired  Left hand, finger to nose with apraxic like movements - difficulty isolating finger and maintaining smooth route to nose    Gross motor coordination: WFL ROM but difficulty coordinating movements of L UE and L LE - primarily L UE as noted with difficulty reaching for items with UE or touching therapist hand/his nose    Functional Status:  MDS G  Bed Mobility Functional Status: S-SBA  Bed Mobility Level of (A):  (S for supine to sit EOB - increased time - used bedrail)    Transfer Functional Status: CGA-Min (A)  Transfer Level of (A):  (SBA for sit to stand; CGA for toilet)    Walk in Room " Functional Status: CGA-Min (A)  Walk In Room Level of (A):  (CGA without AD)    Dressing Functional Status: 2:CGA-Min (A)  Dressing Level of (A) :  (Set Up A for UE; CGA for LE dressing when standing to pull pants/underwear up to waist - able to mervat socks and shoes while seated with increased time)    Eating Functional Status: mod(I) - (I)  Eating Level of (A) :  (Mod I - uses dentures)    Toilet Use Functional Status: CGA-Min (A)  Toilet Use Level of (A) :  (CGA during clothing managment in standing - able to perform hygiene)    Personal Hygiene Functional Status: CGA-Min (A)  Personal Hygiene Level of (A) :  (CGA to wash face and brush teeth in stand at sink due to reported dizziness)    Bathing Functional Status: CGA-Min (A)  Bathing Level of (A) :  (CGA - pt bathed all body parts during sponge bath - CGA in stand to wash buttocks and perineal - sponge bath sitting EOB)    Eval Only: Number of U/E limb <4/5 MMT: 0  Moving from seated to standing position: Not steady, but able to stabilize without staff assistance  Walking (with assistive device if used): Not steady, only able to stabilize with staff assistance  Turning around and facing the opposite direction while walking: Not steady, only able to stabilize with staff assistance  Moving on and off the toilet: Not steady, only able to stabilize with staff assistance  Surface-to-surface transfer (transfer between bed and chair or wheelchair): Not steady, only able to stabilize with staff assistance       Additional Treatment:  · Pt completed ADLs and func mobility activities for tx session as noted above  · Pt noted with decreased standing balance, primarily during dynamic tasks due to reported dizziness  · Pt needed cues for safety and CGA during standing ADLs - pt reports L side feels different then R and is aware of poor coordination on L UE  · Pt educated on ICH and effects it may have on function.  Educated on safety during mobility and ADLs and importance  of calling for assistance  · Pt has full AROM and good strength on L UE but noted with apraxic-like movements when attempting to use L hand purposefully during tasks - pt with wavering movements when attempting to  towel, dropping towel, unable to keep hand steady when attempting to touch nose, etc.  · Pt educated on role of OT and POC      Patient left up in chair with call button in reach    Assessment:  Otis Salcido is a 67 y.o. male with a medical diagnosis of Nontraumatic intracerebral hemorrhage of cerebellum.  Pt was agreeable to OT evaluation.  Goals established to assist pt with returning to OF regarding ADLs and func mobility.  Pt's main barrier is his decreased body awareness to L and apraxic-like movements of L UE and L LE. Pt describes difficulty controlling movements of L UE and was educated on tx techniques that will assist him with gaining control of this extremity with time.  Pt is able to perform all physical aspects of his ADLs, but is at risk for falls when standing during them due to his dizziness and decreased safety awareness.  Pt will benefit from skilled OT services in order to increase his level of safety and independence with ADLs and mobility in order to return to his apartment alone.  At this time, OT recommends HHOT to continue to provide evaluation and tx for safety in his home setting with possible improvement towards OPOT.  DME recommendations at this time include: BSC and TTB,    Pt evaluation falls under moderate complexity for evaluation coding due to identification of 3-5 performance deficits noted as stated above. Eval required Min/Mod assistance to complete on this date and detailed assessment(s) were utilized. Moreover, an expanded review of history and occupational profile obtained with additional review of cognitive, physical and psychosocial hx.       Rehab identified problem list/impairments: weakness, impaired endurance, impaired sensation, impaired self care  skills, impaired functional mobilty, gait instability, impaired balance, decreased coordination, decreased upper extremity function, decreased lower extremity function, decreased safety awareness, impaired coordination, impaired fine motor    Rehab potential is good    Activity tolerance: Good    Discharge recommendations: home health OT     Barriers to discharge: Decreased caregiver support     Equipment recommendations: bedside commode, bath bench     GOALS:    Occupational Therapy Goals        Problem: Occupational Therapy Goal    Goal Priority Disciplines Outcome Interventions   Occupational Therapy Goal     OT, PT/OT Ongoing (interventions implemented as appropriate)    Description:  Goals to be met by: 14 days     Patient will increase functional independence with ADLs by performing:    UE Dressing with Modified [place].  LE Dressing with Modified [place].  Grooming while standing with Modified [place].  Toileting from toilet with Modified [place] for hygiene and clothing management.   Bathing from  shower chair/bench with Modified [place].  Toilet transfer to toilet with Modified [place].  Pt will demonstrate good dynamic standing balance during functional task lasting 10 minutes                      PLAN: Patient to be seen  (5-6x/week) to address the above listed problems via self-care/home management, therapeutic activities, therapeutic exercises, neuromuscular re-education  Plan of Care expires: 11/03/17  Plan of Care reviewed with: patient    Key POWER Brandon, OT  10/04/2017

## 2017-10-04 NOTE — ASSESSMENT & PLAN NOTE
BP well controlled  Cont hydralazine, amlodipine and lisinopril to treat.   Cont to monitor and adjust as needed.

## 2017-10-05 LAB
ANION GAP SERPL CALC-SCNC: 9 MMOL/L
BASOPHILS # BLD AUTO: 0.05 K/UL
BASOPHILS NFR BLD: 1 %
BUN SERPL-MCNC: 12 MG/DL
CALCIUM SERPL-MCNC: 8.8 MG/DL
CHLORIDE SERPL-SCNC: 106 MMOL/L
CO2 SERPL-SCNC: 24 MMOL/L
CREAT SERPL-MCNC: 0.9 MG/DL
DIFFERENTIAL METHOD: ABNORMAL
EOSINOPHIL # BLD AUTO: 0.1 K/UL
EOSINOPHIL NFR BLD: 1.8 %
ERYTHROCYTE [DISTWIDTH] IN BLOOD BY AUTOMATED COUNT: 13.5 %
EST. GFR  (AFRICAN AMERICAN): >60 ML/MIN/1.73 M^2
EST. GFR  (NON AFRICAN AMERICAN): >60 ML/MIN/1.73 M^2
GLUCOSE SERPL-MCNC: 90 MG/DL
HCT VFR BLD AUTO: 41.6 %
HGB BLD-MCNC: 13 G/DL
LYMPHOCYTES # BLD AUTO: 1.9 K/UL
LYMPHOCYTES NFR BLD: 37.9 %
MAGNESIUM SERPL-MCNC: 2 MG/DL
MCH RBC QN AUTO: 28.3 PG
MCHC RBC AUTO-ENTMCNC: 31.3 G/DL
MCV RBC AUTO: 91 FL
MONOCYTES # BLD AUTO: 0.6 K/UL
MONOCYTES NFR BLD: 11.7 %
NEUTROPHILS # BLD AUTO: 2.4 K/UL
NEUTROPHILS NFR BLD: 47.6 %
PHOSPHATE SERPL-MCNC: 3.4 MG/DL
PLATELET # BLD AUTO: 190 K/UL
PMV BLD AUTO: 12.5 FL
POTASSIUM SERPL-SCNC: 3.5 MMOL/L
RBC # BLD AUTO: 4.59 M/UL
SODIUM SERPL-SCNC: 139 MMOL/L
WBC # BLD AUTO: 4.96 K/UL

## 2017-10-05 PROCEDURE — 97116 GAIT TRAINING THERAPY: CPT

## 2017-10-05 PROCEDURE — 84100 ASSAY OF PHOSPHORUS: CPT

## 2017-10-05 PROCEDURE — 99309 SBSQ NF CARE MODERATE MDM 30: CPT | Mod: ,,, | Performed by: NURSE PRACTITIONER

## 2017-10-05 PROCEDURE — 80048 BASIC METABOLIC PNL TOTAL CA: CPT

## 2017-10-05 PROCEDURE — 97530 THERAPEUTIC ACTIVITIES: CPT

## 2017-10-05 PROCEDURE — 97112 NEUROMUSCULAR REEDUCATION: CPT

## 2017-10-05 PROCEDURE — 12000000 HC SNF SEMI-PRIVATE ROOM

## 2017-10-05 PROCEDURE — 25000003 PHARM REV CODE 250: Performed by: STUDENT IN AN ORGANIZED HEALTH CARE EDUCATION/TRAINING PROGRAM

## 2017-10-05 PROCEDURE — 36415 COLL VENOUS BLD VENIPUNCTURE: CPT

## 2017-10-05 PROCEDURE — 85025 COMPLETE CBC W/AUTO DIFF WBC: CPT

## 2017-10-05 PROCEDURE — 83735 ASSAY OF MAGNESIUM: CPT

## 2017-10-05 PROCEDURE — 97535 SELF CARE MNGMENT TRAINING: CPT

## 2017-10-05 RX ADMIN — ATORVASTATIN CALCIUM 40 MG: 20 TABLET, FILM COATED ORAL at 09:10

## 2017-10-05 RX ADMIN — AMLODIPINE BESYLATE 10 MG: 10 TABLET ORAL at 08:10

## 2017-10-05 RX ADMIN — LEVETIRACETAM 500 MG: 500 TABLET, FILM COATED ORAL at 06:10

## 2017-10-05 RX ADMIN — HYDRALAZINE HYDROCHLORIDE 75 MG: 50 TABLET ORAL at 06:10

## 2017-10-05 RX ADMIN — LISINOPRIL 40 MG: 20 TABLET ORAL at 08:10

## 2017-10-05 RX ADMIN — LEVETIRACETAM 250 MG: 250 TABLET, FILM COATED ORAL at 09:10

## 2017-10-05 RX ADMIN — HYDRALAZINE HYDROCHLORIDE 75 MG: 50 TABLET ORAL at 01:10

## 2017-10-05 RX ADMIN — ACETAMINOPHEN 650 MG: 325 TABLET ORAL at 09:10

## 2017-10-05 RX ADMIN — HYDRALAZINE HYDROCHLORIDE 75 MG: 50 TABLET ORAL at 09:10

## 2017-10-05 NOTE — PLAN OF CARE
Problem: Occupational Therapy Goal  Goal: Occupational Therapy Goal  Goals to be met by: 14 days     Patient will increase functional independence with ADLs by performing:    UE Dressing with Modified [place].  LE Dressing with Modified [place].  Grooming while standing with Modified [place].  Toileting from toilet with Modified [place] for hygiene and clothing management.   Bathing from  shower chair/bench with Modified [place].  Toilet transfer to toilet with Modified [place].  Pt will demonstrate good dynamic standing balance during functional task lasting 10 minutes     Outcome: Ongoing (interventions implemented as appropriate)  Mir Chavez OTR/L      10/5/2017

## 2017-10-05 NOTE — PLAN OF CARE
Problem: Patient Care Overview  Goal: Plan of Care Review  Outcome: Ongoing (interventions implemented as appropriate)   10/05/17 0116   Coping/Psychosocial   Plan Of Care Reviewed With patient

## 2017-10-05 NOTE — PROGRESS NOTES
10/05/2017   3:02 PM  Discharge Planning Assessment     Payor: [employer] MANAGED MEDICARE / Plan: [employer] CHOICES PLATINUM / Product Type: Medicare Advantage /     Expected length of stay:  [] 7 days   [] 10 days  [x] 14 days   [] 21 days   [] > 30 days    Communicated expected length of stay with patient/caregiver:  [x] Yes   [] No    Anticipated discharge date:  10/17/2017    Assessment information obtained from:  [x] Patient   [] Caregiver     Arrived from:   [x] Home   [] Assisted Living    [] Nursing Home   [] SNF   [] Rehab  [] LTACH   [] Group Home   [] Foster Care   [] Psych   [] Shelter   [] Homeless   [] Transfer  [] Correctional Facility  [] Name of Facility:      Patient currently lives with:    [x] Alone   [] Spouse   [] Daughter   [] Son   [] Grandparents   []  Parents   [] Siblings   [] Friends   [] Domestic Partner   [] Facility Resident      [] Foster Home    [] Other:       Extended Emergency Contact Information  Primary Emergency Contact: Joselo Castaneda   [place]  Mobile Phone: [phone]  Relation: Son     Prior to hospitalization cognitive status:   [x] Alert/Oriented  [] No Deficits [] Risk of Harm to Self/Others   [] Not Oriented to Person   [] Not Oriented to Place   [] Not Oriented to Time   [] Coma/Sedated/Intubated  [] Judgement Impaired    []  Unable to Assess   [] Inappropriate Behavior  [] Infant/Toddler    Prior to hospitalization functional status:   [x] Independent   [x] Assistive Equipment   [] Assistive Person    [] Completely Dependent  [] Infant/Toddler/Child Appropriate   [] Infant/Toddler/Child Delayed    []  Adolescent     Current cognitive status:   [x] Alert/Oriented  [] No Deficits [] Risk of Harm to Self/Others   [] Not Oriented to Person   [] Not Oriented to Place   [] Not Oriented to Time   [] Coma/Sedated/Intubated  [] Judgement Impaired    []  Unable to Assess   [] Inappropriate Behavior  [] Infant/Toddler    Current functional status:      [] Independent   [x] Assistive Equipment   [x] Assistive Person     [] Completely Dependent   [] Infant/Toddler/Child Appropriate   [] Infant/Toddler/Child Delayed     [] Adolescent     Capacity to Care for Self:   Is patient able to return to prior living arrangements after discharge: [x] Yes  [] No     Is patient able to care for self after discharge?   [] Yes   [x] No     [] Pediatric     Does the patient have family/friends to assist after discharge?:  [x] Yes   [] No    [] N/A   Comments:  Son      Patient/caregiver perception of discharge disposition:   [x] Home   [] Home with Family  [x] Home Health   [] SNF   [] Rehab   [] LTAC    []  New Nursing Home Placement  [] Return to Nursing Home    [] Shelter     [] Assisted Living  [] Foster Home   [] Other:      Readmit:   Has patient dawson in the hospital in the last 30 days? [] Yes   [x] No     If YES, was patient admitted for the same reason?  [] Yes   [] No       Home Health:   Patient currently receives home health services?:   [] Yes   [x] No     Patient previously received home health services and would like to resume services if necessary   [] Yes   [x] No   DME:   Patient currently uses DME:   [x] Yes   [] No        List of equipment currently used:     [] Wheelchair   [] Standard Walker  [x] Rolling Walker  []  Rollator    [] Oxygen    [] Portable oxygen   [] Nebulizer    [] Apnea Monitor    [] Crutches  [] Hospital Bed   []  Lift Device   [] Scooter [] Cane     [] Prosthesis   [x]  BSC   [x] Tub Bench   [] Catheter Supplies    [] Ostomy Supplies   [] Trach Supplies     [] Suction Machine        [] Home Vent    [] Bipap   [] Other:         Medications:    Can the patient afford all prescribed medications?  [x] Yes   [] No     If NO, what medication:       Is the patient taking medications as prescribed?    [x] Yes   [] No    Financial Concerns:   Does the patient have any financial concerns?   [] Yes   [x] No      If YES, what are the concerns:       Transportation:   Does the patient have transportation to healthcare appointments?   [x] Yes   [] No     If YES, what means of transportation does the patient have?   [] Car   [x] Family/Friend  [] Bicycle   [] Motorcycle   [] Public Transportation [] Ambulance[] Wheelchair van   [] Name of Provider    Dialysis:   Does the patient currently receive dialysis?   [] Yes   [x] No     Primary Doctor No     APS/CPS involved in the case:  [] Yes   [x] No   If YES, name of :     If YES, phone number of :        Discharge Plan A:  [x] Home   [] Home with Family  [x] Home Health   [] SNF   [] Rehab   [] LTAC   []  New Nursing Home Placement  [] Return to Nursing Home    [] Assisted Living    [] Shelter     [] Private Duty Nursing   [] Foster Home    [] Psych    [] Early Steps  [] WIC       [] Home Hospice   [] Inpatient Hospice   [] Other    Discharge Plan B:  [] Home   [] Home with Family  [] Home Health   [] SNF   [] Rehab   [] LTAC  []  New Nursing Home Placement   [] Return to  Nursing Home    [] Assisted Living   [] Shelter  [] Private Duty Nursing   [] Foster Home     [] Psych     [] Early Steps  [] WIC    [] Home Hospice     [] Inpatient Hospice   [] Other     [x] Patient and family in agreement with discharge plan.    Met with patient in room to discuss discharge plan and date. Patient AAO, sitting up in chair in room. Discharge plan is to return home alone. Patient stated assist of son. Informed patient therapy recommends a 2 week SNF stay and that discharge would be 10/17/2017. Patient stated understanding to discharge date. Discharge date written on dry erase board in room. Patient stated no home health preference. Patient not sure who he was set up for primary MD. Patient stated he would have his son check at home on the letter. CM and SW will continue to follow for any additional needs.    Jemma Aguilera RN, CM Skilled  [id number]

## 2017-10-05 NOTE — PT/OT/SLP PROGRESS
Occupational Therapy  Treatment    Otis Salcido   MRN: [id number]   Admitting Diagnosis: Nontraumatic intracerebral hemorrhage of cerebellum    OT Date of Treatment: 10/05/17  Total Time (min): 55 min    Billable Minutes:  Self Care/Home Management 30, Therapeutic Activity 10 and Neuromuscular Alyssia 15    General Precautions: Standard, aspiration, fall  Orthopedic Precautions: N/A  Braces: N/A         Subjective:  Communicated with nurse prior to session.      Pain/Comfort  Pain Rating 1: 0/10    Objective:  Patient found with:  (no lines)    Functional Status:  MDS G  Transfer Functional Status: SBA (with RW to perform SPT and sit to stand transition.)    Locomotion on Unit Functional Status: SBA (Pt propelled W/C from therapy gym to his room using (B) UEs a distance of 178ft with (S) and increased time.)    Dressing Functional Status: :CGA- (SBA with UBD seated and CGA when standing donning/doffing button front shirt. CGA with LBD donning/ doffing pants, socks and slip on shoes with CGA when standing to manage pants over hips.      Moving from seated to standing position: Not steady, but able to stabilize without staff assistance (to RW)  Walking (with assistive device if used): Not steady, only able to stabilize with staff assistance  Turning around and facing the opposite direction while walking: Not steady, only able to stabilize with staff assistance  Surface-to-surface transfer (transfer between bed and chair or wheelchair): Not steady, only able to stabilize with staff assistance (with RW to steady when standing.)          OT Exercises: UE Ergometer performed 15 minutes on UBE with Mod resistance.    Additional Treatment:  Performed functional stansding balance and neuro reeducation activity with Pt standing at table top and performing reaching activity with (L) UE from table height to above shld level and in varying planes. Pt removing and then replacing 24 PVC collars in speciific locations on table top to  refine control of (L) UE with performance of functional activities. Pt tolerated up to 12 minutes of functiona standing activity with RW to steady and no physical assist of therapist.    Patient left up in chair with call button in reach and nurse notified    ASSESSMENT:  Otis Salcido is a 67 y.o. male with a medical diagnosis of Nontraumatic intracerebral hemorrhage of cerebellum . Pt was agreeable to OT and tolerated Tx without incident but continues to require (A) to perform functional activities to completion as well as to safely perform functional mobility and functional transfers.  Pt is making slow progress but continues to require OT Intervention to further his   functional (I)ce and safety. Goals remain appropriate and continued OT is recommended.    Rehab identified problem list/impairments: weakness, impaired endurance, impaired sensation, impaired self care skills, impaired functional mobilty, gait instability, impaired balance, decreased coordination, decreased upper extremity function, decreased lower extremity function, decreased safety awareness, impaired coordination, impaired fine motor    Rehab potential is good    Activity tolerance: Good    Discharge recommendations: home health OT     Barriers to discharge: Decreased caregiver support     Equipment recommendations: bedside commode, bath bench     GOALS:    Occupational Therapy Goals        Problem: Occupational Therapy Goal    Goal Priority Disciplines Outcome Interventions   Occupational Therapy Goal     OT, PT/OT Ongoing (interventions implemented as appropriate)    Description:  Goals to be met by: 14 days     Patient will increase functional independence with ADLs by performing:    UE Dressing with Modified [place].  LE Dressing with Modified [place].  Grooming while standing with Modified [place].  Toileting from toilet with Modified [place] for hygiene and clothing management.   Bathing from  shower chair/bench with  Modified [place].  Toilet transfer to toilet with Modified [place].  Pt will demonstrate good dynamic standing balance during functional task lasting 10 minutes                      Plan:  Patient to be seen  (5-6x/week) to address the above listed problems via self-care/home management, therapeutic activities, therapeutic exercises, neuromuscular re-education  Plan of Care expires: 11/03/17  Plan of Care reviewed with: patient    FIDELINA Hernandez/CRISTOFER  10/05/2017

## 2017-10-05 NOTE — PLAN OF CARE
Problem: Physical Therapy Goal  Goal: Physical Therapy Goal  Goals to be met by: 2 weeks     Patient will increase functional independence with mobility by performin. Supine to sit with [place].  2. Sit to supine with [place].  3. Sit to stand transfer with [place].  4. Bed to chair transfer with [place].  5. Gait  x 300 feet with Modified [place] with/without an assistive device.  6. Ascend/descend 1 flight of stairs with right Handrails modified independence.  7. Stand for 5 minutes with modified independence while performing a task (playing ladder ball, throwing beach ball). Met (10/5/2017)  8. Lower extremity exercise program x 20 standing exercises with unilateral UE support (hip flexion, hip abduction, heel raises).   9. Pt will be able to ambulate in all directions x 20 feet to display the ability to change direction of ambulation without loss of balance (supervision).   Outcome: Ongoing (interventions implemented as appropriate)  Met one goal today.

## 2017-10-05 NOTE — PT/OT/SLP PROGRESS
Physical Therapy  Treatment    Otis Salcido   MRN: [id number]   Admitting Diagnosis: Nontraumatic intracerebral hemorrhage of cerebellum    PT Received On: 10/05/17  Total Time (min): 53       Billable Minutes:  Gait [id number] and Therapeutic Activity 38    Treatment Type: Treatment  PT/PTA: PT     PTA Visit Number: 0       General Precautions: Standard, aspiration, fall  Orthopedic Precautions: N/A   Braces: N/A    Do you have any cultural, spiritual, Jain conflicts, given your current situation?: no    Subjective:  Communicated with pt prior to session.  Pt reported that his LUE gives him trouble. Otherwise, he feels fine with regards to his fine motor coordination.    Pain/Comfort  Pain Rating 1: 0/10    Objective:  Patient found seated at edge of bed.          Functional Status:  MDS G  Bed Mobility Functional Status: S-SBA  Transfer Functional Status: S-SBA  Walk in Room Functional Status: S-SBA  Walk in Corridor Functional Status: CGA-Min (A)  Locomotion on Unit Functional Status: CGA-Min (A)    Transfers:  Sit<>Stand: SBA from edge of bed, wheelchair    Gait:  Amb 100 feet x 2 trials with rolling walker with SBA     Balance:  Pt was able to stand for >5 minutes while performing grooming this morning with supervision and upper extremity support.    Additional Treatment:  Coordination activity (movement of body on stable arms using yoga blocks and stool): 3 trials x 10 reps each with SBA.   Pt did well with this. Purpose is to retrain body to coordinate the body and upper extremities in relation to each other.    Scapular flexion with facilitation provided at L scapula- 2x10 reps (focusing on straightening the elbow, preventing internal rotation of shoulder)    Patient left up in chair with call button in reach.    Assessment:  Otis Salcido is a 67 y.o. male with a medical diagnosis of Nontraumatic intracerebral hemorrhage of cerebellum.  Mr. Salcido will continue to benefit from coordination activities  that will allow his afferent and efferent tracts to awaken to/from his LUE to allow for better fine motor movement without ataxia.  He will continue to benefit from use of the rolling walker while ambulating to ensure safety at this time due to slight instability.    Rehab identified problem list/impairments: weakness, impaired endurance, impaired self care skills, impaired functional mobilty, gait instability, decreased coordination, decreased upper extremity function, decreased lower extremity function, decreased safety awareness, impaired fine motor    Rehab potential is good 2/2 motivation, participation, and current level of assistance (supervision to CGA).    Activity tolerance:good    Discharge recommendations: home health PT     Barriers to discharge: Decreased caregiver support (lives alone)    Equipment recommendations: none     GOALS:    Physical Therapy Goals        Problem: Physical Therapy Goal    Goal Priority Disciplines Outcome Goal Variances Interventions   Physical Therapy Goal     PT/OT, PT Ongoing (interventions implemented as appropriate)     Description:  Goals to be met by: 2 weeks     Patient will increase functional independence with mobility by performin. Supine to sit with [place].  2. Sit to supine with [place].  3. Sit to stand transfer with [place].  4. Bed to chair transfer with [place].  5. Gait  x 300 feet with Modified [place] with/without an assistive device.  6. Ascend/descend 1 flight of stairs with right Handrails modified independence.  7. Stand for 5 minutes with modified independence while performing a task (playing ladder ball, throwing beach ball). Met (10/5/2017)  8. Lower extremity exercise program x 20 standing exercises with unilateral UE support (hip flexion, hip abduction, heel raises).   9. Pt will be able to ambulate in all directions x 20 feet to display the ability to change direction of ambulation without loss of balance  (supervision).                     PLAN:    Patient to be seen  (5-6x/wk)  to address the above listed problems via gait training, therapeutic activities, therapeutic exercises, neuromuscular re-education  Plan of Care expires: 11/03/17  Plan of Care reviewed with:      Frances Mcfarland, PT  10/05/2017

## 2017-10-05 NOTE — PLAN OF CARE
Problem: Patient Care Overview  Goal: Plan of Care Review  Outcome: Ongoing (interventions implemented as appropriate)  FALL PRECAUTIONS MAINTAINED NO INJURIES NOTED.

## 2017-10-06 PROCEDURE — 97116 GAIT TRAINING THERAPY: CPT

## 2017-10-06 PROCEDURE — 12000000 HC SNF SEMI-PRIVATE ROOM

## 2017-10-06 PROCEDURE — 97535 SELF CARE MNGMENT TRAINING: CPT

## 2017-10-06 PROCEDURE — 97110 THERAPEUTIC EXERCISES: CPT

## 2017-10-06 PROCEDURE — 97112 NEUROMUSCULAR REEDUCATION: CPT

## 2017-10-06 PROCEDURE — 97530 THERAPEUTIC ACTIVITIES: CPT

## 2017-10-06 PROCEDURE — 25000003 PHARM REV CODE 250: Performed by: STUDENT IN AN ORGANIZED HEALTH CARE EDUCATION/TRAINING PROGRAM

## 2017-10-06 RX ORDER — HYDRALAZINE HYDROCHLORIDE 50 MG/1
50 TABLET, FILM COATED ORAL EVERY 8 HOURS
Status: DISCONTINUED | OUTPATIENT
Start: 2017-10-06 | End: 2017-10-10

## 2017-10-06 RX ADMIN — AMLODIPINE BESYLATE 10 MG: 10 TABLET ORAL at 10:10

## 2017-10-06 RX ADMIN — ATORVASTATIN CALCIUM 40 MG: 20 TABLET, FILM COATED ORAL at 09:10

## 2017-10-06 RX ADMIN — LEVETIRACETAM 250 MG: 250 TABLET, FILM COATED ORAL at 09:10

## 2017-10-06 RX ADMIN — LISINOPRIL 40 MG: 20 TABLET ORAL at 10:10

## 2017-10-06 RX ADMIN — HYDRALAZINE HYDROCHLORIDE 75 MG: 50 TABLET ORAL at 06:10

## 2017-10-06 RX ADMIN — LEVETIRACETAM 500 MG: 500 TABLET, FILM COATED ORAL at 06:10

## 2017-10-06 NOTE — SUBJECTIVE & OBJECTIVE
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  10/5: Patient seen during therapy, therapist concern patient c/o headache and dizziness, upon further interviewed patient explained he has been having a headache every morning since going to main campus along with dizziness. Headache and dizziness isn't new and both have not worsen since onset. Discussed with patient s/s of worsening hemorrhage and instructed patient to inform staff if occurs. Verbalized understanding.       Interval History: Patient seen today, c/o headache and dizziness    Review of Systems   Constitutional: Negative for appetite change, chills, fatigue and fever.   HENT: Negative for trouble swallowing.    Respiratory: Negative for cough, chest tightness, shortness of breath and wheezing.    Cardiovascular: Negative for chest pain, palpitations and leg swelling.   Gastrointestinal: Negative for abdominal pain, constipation, diarrhea and nausea.   Genitourinary: Negative for difficulty urinating, frequency and urgency.   Musculoskeletal: Negative for arthralgias and myalgias.   Skin: Negative for rash.   Neurological: Positive for dizziness and headaches. Negative for weakness and light-headedness.   Psychiatric/Behavioral: Negative for sleep disturbance.     Scheduled Meds:   amlodipine  10 mg Oral Daily    atorvastatin  40 mg Oral QHS    hydrALAZINE  75 mg Oral Q8H    levetiracetam  250 mg Oral Nightly    levetiracetam  500 mg Oral QAM    lisinopril  40 mg Oral Daily    polyethylene glycol  17 g Oral Daily    senna-docusate 8.6-50 mg  1 tablet Oral BID     Continuous Infusions:   PRN Meds:.acetaminophen, calcium carbonate, ramelteon  Objective:     Vital Signs (Most Recent):  Temp: 96.9 °F (36.1 °C) (10/05/17 1911)  Pulse: 85 (10/05/17 1911)  Resp: 18 (10/05/17 1911)  BP: 121/68 (10/05/17 2113)  SpO2: 97 % (10/05/17 1911)  Vital Signs (24h Range):  Temp:  [96.9 °F (36.1 °C)-97.7 °F (36.5 °C)] 96.9 °F (36.1 °C)  Pulse:  [77-85] 85  Resp:  [18] 18  SpO2:  [95 %-97 %] 97 %  BP: (100-130)/(50-72) 121/68     Weight: 74.3 kg (163 lb 12.8 oz)  Body mass index is 25.65 kg/m².    Intake/Output Summary (Last 24 hours) at 10/05/17 2333  Last data filed at 10/05/17 1748   Gross per 24 hour   Intake             1025 ml   Output                0 ml   Net             1025 ml      Physical Exam   Constitutional: He is oriented to person, place, and time. He appears well-developed and well-nourished. No distress.   Cardiovascular: Normal rate, regular rhythm and normal heart sounds.  Exam reveals no gallop and no friction rub.    No murmur heard.  Pulmonary/Chest: Effort normal and breath sounds normal. No respiratory distress. He has no wheezes. He has no rales.   Abdominal: Soft. Bowel sounds are normal. He exhibits no distension. There is no tenderness.   Musculoskeletal: Normal range of motion. He exhibits no edema or tenderness.   Neurological: He is alert and oriented to person, place, and time.   Skin: Skin is warm and dry. No rash noted. He is not diaphoretic. No cyanosis. Nails show no clubbing.   Psychiatric: He has a normal mood and affect. His behavior is normal.     Significant Labs:    Recent Labs  Lab 10/02/17  0431 10/03/17  0354 10/05/17  0440   WBC 4.51 5.03 4.96   HGB 13.7* 13.5* 13.0*   HCT 42.0 41.3 41.6    202 190       Recent Labs  Lab 10/01/17  0407 10/02/17  0431 10/03/17  0354 10/05/17  0440    138 138 139   K 3.4* 3.5 3.4* 3.5    107 105 106   CO2 23 24 24 24   BUN 11 10 16 12   CREATININE 0.9 0.9 1.0 0.9   CALCIUM 9.0 9.2 9.3 8.8   PROT 7.1 7.0 7.3  --    BILITOT 0.6 0.8 0.6  --    ALKPHOS 91 81 86  --    ALT 14 16 23  --    AST 16 17 28  --      Lab Results   Component Value Date    LABPROT 10.6 09/28/2017    ALBUMIN 3.1 (L) 10/03/2017     Lab Results   Component Value Date    CALCIUM 8.8 10/05/2017     PHOS 3.4 10/05/2017         Significant Imaging: n/a

## 2017-10-06 NOTE — HOSPITAL COURSE
10/3/17: Patient admitted to SNF for ongoing PT/OT following a hospitalization for left-sided nontraumatic intracerebral hemorrhage of cerebellum.  10/5: Patient seen during therapy, therapist concern patient c/o headache and dizziness, upon further interviewed patient explained he has been having a headache every morning since going to main campus along with dizziness. Headache and dizziness isn't new and both have not worsen since onset. Discussed with patient s/s of worsening hemorrhage and instructed patient to inform staff if occurs. Verbalized understanding.     10/9: hypotensive adjusted hydralazine   10/11: hypotensive stopped hydralazine  10/12: BP improved, patient doing well no complaints, labs reviewed--K+ replaced  10/17: Patient doing well, discussed discharged, discharge home today

## 2017-10-06 NOTE — PLAN OF CARE
Problem: Physical Therapy Goal  Goal: Physical Therapy Goal  Goals to be met by: 2 weeks     Patient will increase functional independence with mobility by performin. Supine to sit with [place].  2. Sit to supine with [place].  3. Sit to stand transfer with [place].  4. Bed to chair transfer with [place].  5. Gait  x 300 feet with Modified [place] with/without an assistive device.  6. Ascend/descend 1 flight of stairs with right Handrails modified independence.  7. Stand for 5 minutes with modified independence while performing a task (playing ladder ball, throwing beach ball). Met (10/5/2017)  8. Lower extremity exercise program x 20 standing exercises with unilateral UE support (hip flexion, hip abduction, heel raises).   9. Pt will be able to ambulate in all directions x 20 feet to display the ability to change direction of ambulation without loss of balance (supervision).    Outcome: Ongoing (interventions implemented as appropriate)  Goals remain appropriate

## 2017-10-06 NOTE — PLAN OF CARE
Problem: Patient Care Overview  Goal: Plan of Care Review  Outcome: Ongoing (interventions implemented as appropriate)   10/06/17 0213   Coping/Psychosocial   Plan Of Care Reviewed With patient

## 2017-10-06 NOTE — ASSESSMENT & PLAN NOTE
-Neurologically stable  -Continue neuro checks  -Continue to hold anticoagulation  -Continue Keppra for seizure prophylaxis   -Continue with PT/OT for gait training and strengthening and restoration of ADL's   -Continue DVT prophylaxis with TEDs/SCD  -Follow up in neurosurgery clinic in 8 weeks with MRI brain with Dr. Hall  -fall precautions   -delirium precautions   -continue tylenol for headaches  -continue bowel regimen with senokot-s

## 2017-10-06 NOTE — PT/OT/SLP PROGRESS
"Occupational Therapy  Treatment    Otis Salcido   MRN: [id number]   Admitting Diagnosis: Nontraumatic intracerebral hemorrhage of cerebellum    OT Date of Treatment: 10/06/17  Total Time (min): 45 min    Billable Minutes:  Self Care/Home Management 15 and Neuromuscular Re-education 30    General Precautions: Standard, aspiration, fall  Orthopedic Precautions: N/A  Braces: N/A         Subjective:  Communicated with nurse prior to session.  "Why is my arm doing that"  Referring to uncoordinated movements of L UE      Pain/Comfort  Pain Rating 1: 0/10  Pain Rating Post-Intervention 1: 0/10    Objective:  Patient found with:  (sitting in chair - no lines)    Functional Status:  MDS G  Transfer Functional Status: CGA-Min (A)  Transfer Level of (A):  (CGA for toilet transfer and sit to stand from chair)    Walk in Room Functional Status: S-SBA  Walk In Room Level of (A):  (SBA using RW)    Walk in Corridor Functional Status: CGA-Min (A)  Walk In Corridor Level of (A):  (CGA using RW from room <-> gym - pt with occasional balance shift to his L - also L leg dragged on ground ~4 times during both walking events and held R elbow locked in extension when using RW to gain control of RW due to apraxic movements of L UE which caused RW to advance on pt - pt responded to cues to  feet but noted with difficulty relaxing R UE to maintain RW closer to trunk)    Toilet Use Functional Status: S-SBA  Toilet Use Level of (A) :  (SBA during standing clothing management)    Personal Hygiene Functional Status: S-SBA  Personal Hygiene Level of (A) :  (SBA while standing with RW to wash hands)    Moving from seated to standing position: Not steady, but able to stabilize without staff assistance  Walking (with assistive device if used): Not steady, only able to stabilize with staff assistance  Turning around and facing the opposite direction while walking: Not steady, only able to stabilize with staff assistance  Moving on and off the " "toilet: Not steady, but able to stabilize without staff assistance  Surface-to-surface transfer (transfer between bed and chair or wheelchair): Not steady, only able to stabilize with staff assistance        Additional Treatment:  · Pt worked on NMR techniques to regulate motor control of L UE due to difficulty performing bimanual ADL tasks (eating, grooming, securing fasteners, etc).  Emphasis of NMR activities included WB to normalize MT and coordination, as well as, repetitive movements to improve quality of movement.  Pt completed the following tasks:  · WB of L UE on quadruped while performing task using R UE - pt performed task for 10 minutes with SBA - pt needed vcs to maintain L elbow extended and shoulder over hand for appropriate weight distribution.  Pt tolerated task with rest breaks after first 5 minutes, but more frequently for the remaining 5 minutes.   · Pt performed reaching activity in standing for ~8 minutes - pt utilized L UE to  items from different planes (crossing midline to R, overhead and to his L). Pt wore 1# wrist weight for strengthening and greater proprioceptive input to L UE.  Shoulder stabilization also provided manually on L side.  Pt noted with "wobbly" shoulder movement when reaching distally which decreased with more proximal tasks.  Pt also noted with smaller oscilations.  ·  In sitting, pt worked on strengthening his L UE grasping skills using clothing pins of various tensions.  Pt able to use tripod grasp to squeeze and remove clothing pins from horizontal dowel and placed them on vertical dowel, eliciting shoulder flexion and crossing midline.  Again, pt noted with greater oscilations originating at shoulder which decreased with repetitive activity.     Patient left up in chair with call button in reach    ASSESSMENT:  Oits Salcido is a 67 y.o. male with a medical diagnosis of Nontraumatic intracerebral hemorrhage of cerebellum.  Pt was agreeable to OT and was noted " to make progress towards his goals in therapy.  Pt's goals remain appropriate at this time.  Pt continues to work with OT on improving his L UE motor control and bilateral UE function in order to achieve his goals for independence with his ADLs.   He will continue to benefit from skilled OT services in order to assist him with increasing his safety and level of independence with self care and mobility tasks.       Rehab identified problem list/impairments: weakness, impaired endurance, impaired sensation, impaired self care skills, impaired functional mobilty, gait instability, impaired balance, decreased coordination, decreased upper extremity function, decreased lower extremity function, decreased safety awareness, impaired coordination, impaired fine motor    Rehab potential is good    Activity tolerance: Good    Discharge recommendations: home health OT     Barriers to discharge: Decreased caregiver support     Equipment recommendations: bedside commode, bath bench     GOALS:    Occupational Therapy Goals        Problem: Occupational Therapy Goal    Goal Priority Disciplines Outcome Interventions   Occupational Therapy Goal     OT, PT/OT Ongoing (interventions implemented as appropriate)    Description:  Goals to be met by: 14 days     Patient will increase functional independence with ADLs by performing:    UE Dressing with Modified [place].  LE Dressing with Modified [place].  Grooming while standing with Modified [place].  Toileting from toilet with Modified [place] for hygiene and clothing management.   Bathing from  shower chair/bench with Modified [place].  Toilet transfer to toilet with Modified [place].  Pt will demonstrate good dynamic standing balance during functional task lasting 10 minutes                      Plan:  Patient to be seen  (5-6x/week) to address the above listed problems via self-care/home management, therapeutic activities, therapeutic exercises,  neuromuscular re-education  Plan of Care expires: 11/03/17  Plan of Care reviewed with: patient   ** Plan to continue to work on NMR of L UE to gain motor control (GM and FM) to facilitate independence during ADLS.    Key Taylor OT  10/06/2017

## 2017-10-06 NOTE — PROGRESS NOTES
[facility] Department of Hospital Medicine  Progress Note    Patient Name: Otis Salcido  MRN: [id number]  Code Status: Full Code  Admission Date: 10/3/2017  Length of Stay: 2 days  Attending Physician: Mima Vázquez MD  Primary Care Provider: Primary Doctor No    Subjective:     Principal Problem:Nontraumatic intracerebral hemorrhage of cerebellum    Chief Complaint/Reason for Admission: Debility    History of Present Illness:  Patient is a 67 y.o. male who has a past medical history of Glaucoma; High cholesterol; History of blood clots (on Xarelto); Hypertension; Kidney stone; and Prostate cancer s/p resection presented with headache and found to have left-sided nontraumatic intracerebral hemorrhage of cerebellum. Neurosurgery and vascular neurology were consulted and recommended non surgical management. Etiology of ICH likely hypertensive vs ischemic stroke with hemorrhagic transformation while on rivaroxaban. Patient has been working with PT/OT who recommend inpatient rehab for further balance/mobility training however insurance denied admission. Patient now being admitted to SNF for rehab. Patient lives in apartment alone and he was independent with ADLs prior to admission. Patient currently denies any fever/chills, chest pain, dyspnea, abdominal pain, nausea/vomiting.     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  10/5: Patient seen during therapy, therapist concern patient c/o headache and dizziness, upon further interviewed patient explained he has been having a headache every morning since going to main campus along with dizziness. Headache and dizziness isn't new and both have not worsen since onset. Discussed with patient s/s of worsening hemorrhage and instructed patient to inform staff if occurs. Verbalized understanding.       Interval History: Patient seen  today, c/o headache and dizziness    Review of Systems   Constitutional: Negative for appetite change, chills, fatigue and fever.   HENT: Negative for trouble swallowing.    Respiratory: Negative for cough, chest tightness, shortness of breath and wheezing.    Cardiovascular: Negative for chest pain, palpitations and leg swelling.   Gastrointestinal: Negative for abdominal pain, constipation, diarrhea and nausea.   Genitourinary: Negative for difficulty urinating, frequency and urgency.   Musculoskeletal: Negative for arthralgias and myalgias.   Skin: Negative for rash.   Neurological: Positive for dizziness and headaches. Negative for weakness and light-headedness.   Psychiatric/Behavioral: Negative for sleep disturbance.     Scheduled Meds:   amlodipine  10 mg Oral Daily    atorvastatin  40 mg Oral QHS    hydrALAZINE  75 mg Oral Q8H    levetiracetam  250 mg Oral Nightly    levetiracetam  500 mg Oral QAM    lisinopril  40 mg Oral Daily    polyethylene glycol  17 g Oral Daily    senna-docusate 8.6-50 mg  1 tablet Oral BID     Continuous Infusions:   PRN Meds:.acetaminophen, calcium carbonate, ramelteon  Objective:     Vital Signs (Most Recent):  Temp: 96.9 °F (36.1 °C) (10/05/17 1911)  Pulse: 85 (10/05/17 1911)  Resp: 18 (10/05/17 1911)  BP: 121/68 (10/05/17 2113)  SpO2: 97 % (10/05/17 1911) Vital Signs (24h Range):  Temp:  [96.9 °F (36.1 °C)-97.7 °F (36.5 °C)] 96.9 °F (36.1 °C)  Pulse:  [77-85] 85  Resp:  [18] 18  SpO2:  [95 %-97 %] 97 %  BP: (100-130)/(50-72) 121/68     Weight: 74.3 kg (163 lb 12.8 oz)  Body mass index is 25.65 kg/m².    Intake/Output Summary (Last 24 hours) at 10/05/17 2663  Last data filed at 10/05/17 1748   Gross per 24 hour   Intake             1025 ml   Output                0 ml   Net             1025 ml      Physical Exam   Constitutional: He is oriented to person, place, and time. He appears well-developed and well-nourished. No distress.   Cardiovascular: Normal rate, regular  rhythm and normal heart sounds.  Exam reveals no gallop and no friction rub.    No murmur heard.  Pulmonary/Chest: Effort normal and breath sounds normal. No respiratory distress. He has no wheezes. He has no rales.   Abdominal: Soft. Bowel sounds are normal. He exhibits no distension. There is no tenderness.   Musculoskeletal: Normal range of motion. He exhibits no edema or tenderness.   Neurological: He is alert and oriented to person, place, and time.   Skin: Skin is warm and dry. No rash noted. He is not diaphoretic. No cyanosis. Nails show no clubbing.   Psychiatric: He has a normal mood and affect. His behavior is normal.     Significant Labs:    Recent Labs  Lab 10/02/17  0431 10/03/17  0354 10/05/17  0440   WBC 4.51 5.03 4.96   HGB 13.7* 13.5* 13.0*   HCT 42.0 41.3 41.6    202 190       Recent Labs  Lab 10/01/17  0407 10/02/17  0431 10/03/17  0354 10/05/17  0440    138 138 139   K 3.4* 3.5 3.4* 3.5    107 105 106   CO2 23 24 24 24   BUN 11 10 16 12   CREATININE 0.9 0.9 1.0 0.9   CALCIUM 9.0 9.2 9.3 8.8   PROT 7.1 7.0 7.3  --    BILITOT 0.6 0.8 0.6  --    ALKPHOS 91 81 86  --    ALT 14 16 23  --    AST 16 17 28  --      Lab Results   Component Value Date    LABPROT 10.6 09/28/2017    ALBUMIN 3.1 (L) 10/03/2017     Lab Results   Component Value Date    CALCIUM 8.8 10/05/2017    PHOS 3.4 10/05/2017         Significant Imaging: n/a    Assessment/Plan:      * Nontraumatic intracerebral hemorrhage of cerebellum    -Neurologically stable  -Continue neuro checks  -Continue to hold anticoagulation  -Continue Keppra for seizure prophylaxis   -Continue with PT/OT for gait training and strengthening and restoration of ADL's   -Continue DVT prophylaxis with TEDs/SCD  -Follow up in neurosurgery clinic in 8 weeks with MRI brain with Dr. Hall  -fall precautions   -delirium precautions   -continue tylenol for headaches  -continue bowel regimen with senokot-s         Debility    -Continue PT/OT as above.          History of deep venous thrombosis (DVT) of distal vein of left lower extremity 11/2016    -Plan as above.         History of blood clots    -Holding anticoagulation for now until cleared by neurosurgery.   -continue TEDs and SCDs        High cholesterol    -Continue home atorvastatin 40mg daily to treat.         Essential hypertension    -BP well controlled  -Continue hydralazine 75mg q8hrs, amlodipine 10mg daily and lisinopril 40mg daily to treat.   -Continue to monitor and adjust as needed.           Future Appointments  Date Time Provider Department Center   11/6/2017 12:45 PM [facility] MRI4 [facility] MRI Luisito Romano   11/6/2017 2:00 PM Paul Arriaga MD [facility] NEUROS7 Luisito French NP  Department of Hospital Medicine  [facility]

## 2017-10-06 NOTE — ASSESSMENT & PLAN NOTE
-BP well controlled  -Continue hydralazine 75mg q8hrs, amlodipine 10mg daily and lisinopril 40mg daily to treat.   -Continue to monitor and adjust as needed.

## 2017-10-06 NOTE — PLAN OF CARE
Problem: Occupational Therapy Goal  Goal: Occupational Therapy Goal  Goals to be met by: 14 days     Patient will increase functional independence with ADLs by performing:    UE Dressing with Modified [place].  LE Dressing with Modified [place].  Grooming while standing with Modified [place].  Toileting from toilet with Modified [place] for hygiene and clothing management.   Bathing from  shower chair/bench with Modified [place].  Toilet transfer to toilet with Modified [place].  Pt will demonstrate good dynamic standing balance during functional task lasting 10 minutes     Outcome: Ongoing (interventions implemented as appropriate)    Pt was agreeable to OT and was noted to make progress towards his goals in therapy.  Pt's goals remain appropriate at this time.  He will continue to benefit from skilled OT services in order to assist him with increasing his safety and level of independence with self care and mobility tasks.     Key Taylor OT  10/6/2017

## 2017-10-06 NOTE — PT/OT/SLP PROGRESS
"Physical Therapy  Treatment    Otis Salcido   MRN: [id number]   Admitting Diagnosis: Nontraumatic intracerebral hemorrhage of cerebellum    PT Received On: 10/06/17  Total Time (min): 45       Billable Minutes:45    Gait [id number], Therapeutic Activity 8 and Therapeutic Exercise 25    Treatment Type: Treatment  PT/PTA: PTA     PTA Visit Number: 1       General Precautions: Standard, aspiration, fall  Orthopedic Precautions: N/A   Braces: N/A    Do you have any cultural, spiritual, [employer] conflicts, given your current situation?: no    Subjective:  "feeling OK"      Pain/Comfort  Pain Rating 1: 0/10  Pain Rating Post-Intervention 1: 0/10    Objective:  Patient found sitting BS chair     Functional Status:  MDS G  Transfer Functional Status: CGA-Min (A)  Walk in Corridor Functional Status: CGA-Min (A)        Transfers:  Sit<>Stand: SBA with RW vcs for handplacement  Stand Pivot Transfer: CGA no AD BSC>WC      Gait:  Amb with RW CGA/close SBA ~150 ft x 2 trials ,seated rest break, vcs for safety negotiating turn not to lift up RW, mild ataxia noted         Therex:  2x10 reps AP,LAQ,hip flex,abd/add    Balance:  Dynamic standing bal act with RW uni lat support tapping balloon with BUE min/CGA ~ 2 minutes  Kicking small ball with BLE BUE support min CGA ~ 2 minutes    Additional Treatment:  LBE x 15 minutes    Patient left up in chair with call button in reach and belongings in reach.    Assessment:  Otis Salcido is a 67 y.o. male with a medical diagnosis of Nontraumatic intracerebral hemorrhage of cerebellum.  Pt tolerated well, challenged with dynamic standing activities requiring min/CGA for balance, hand/eye coordination, BUE/BLE control mvmts 2* to ataxia, pt would continue to benefit from skilled PT services to improve overall functional mobility, strength and endurance.  .    Rehab identified problem list/impairments: weakness, impaired endurance, impaired self care skills, impaired functional mobilty, gait " instability, decreased coordination, decreased upper extremity function, decreased lower extremity function, decreased safety awareness, impaired fine motor    Rehab potential is good.    Activity tolerance: Fair    Discharge recommendations: home health PT     Barriers to discharge: Decreased caregiver support (lives alone)    Equipment recommendations: none     GOALS:    Physical Therapy Goals        Problem: Physical Therapy Goal    Goal Priority Disciplines Outcome Goal Variances Interventions   Physical Therapy Goal     PT/OT, PT Ongoing (interventions implemented as appropriate)     Description:  Goals to be met by: 2 weeks     Patient will increase functional independence with mobility by performin. Supine to sit with [place].  2. Sit to supine with [place].  3. Sit to stand transfer with [place].  4. Bed to chair transfer with [place].  5. Gait  x 300 feet with Modified [place] with/without an assistive device.  6. Ascend/descend 1 flight of stairs with right Handrails modified independence.  7. Stand for 5 minutes with modified independence while performing a task (playing ladder ball, throwing beach ball). Met (10/5/2017)  8. Lower extremity exercise program x 20 standing exercises with unilateral UE support (hip flexion, hip abduction, heel raises).   9. Pt will be able to ambulate in all directions x 20 feet to display the ability to change direction of ambulation without loss of balance (supervision).                     PLAN:    Patient to be seen  (5-6x/wk)  to address the above listed problems via gait training, therapeutic activities, therapeutic exercises, neuromuscular re-education  Plan of Care expires: 17  Plan of Care reviewed with:      Amira Salazar PTA  10/06/2017

## 2017-10-07 PROCEDURE — 97116 GAIT TRAINING THERAPY: CPT

## 2017-10-07 PROCEDURE — 97110 THERAPEUTIC EXERCISES: CPT

## 2017-10-07 PROCEDURE — 25000003 PHARM REV CODE 250: Performed by: NURSE PRACTITIONER

## 2017-10-07 PROCEDURE — 25000003 PHARM REV CODE 250: Performed by: STUDENT IN AN ORGANIZED HEALTH CARE EDUCATION/TRAINING PROGRAM

## 2017-10-07 PROCEDURE — 97530 THERAPEUTIC ACTIVITIES: CPT

## 2017-10-07 PROCEDURE — 12000000 HC SNF SEMI-PRIVATE ROOM

## 2017-10-07 RX ADMIN — LEVETIRACETAM 250 MG: 250 TABLET, FILM COATED ORAL at 09:10

## 2017-10-07 RX ADMIN — LISINOPRIL 40 MG: 20 TABLET ORAL at 10:10

## 2017-10-07 RX ADMIN — ATORVASTATIN CALCIUM 40 MG: 20 TABLET, FILM COATED ORAL at 09:10

## 2017-10-07 RX ADMIN — LEVETIRACETAM 500 MG: 500 TABLET, FILM COATED ORAL at 06:10

## 2017-10-07 RX ADMIN — AMLODIPINE BESYLATE 10 MG: 10 TABLET ORAL at 10:10

## 2017-10-07 RX ADMIN — HYDRALAZINE HYDROCHLORIDE 50 MG: 50 TABLET ORAL at 09:10

## 2017-10-07 NOTE — PLAN OF CARE
Problem: Physical Therapy Goal  Goal: Physical Therapy Goal  Goals to be met by: 2 weeks     Patient will increase functional independence with mobility by performin. Supine to sit with [place].  2. Sit to supine with [place].  3. Sit to stand transfer with [place].  4. Bed to chair transfer with [place].  5. Gait  x 300 feet with Modified [place] with/without an assistive device.  6. Ascend/descend 1 flight of stairs with right Handrails modified independence.  7. Stand for 5 minutes with modified independence while performing a task (playing ladder ball, throwing beach ball). Met (10/5/2017)  8. Lower extremity exercise program x 20 standing exercises with unilateral UE support (hip flexion, hip abduction, heel raises).   9. Pt will be able to ambulate in all directions x 20 feet to display the ability to change direction of ambulation without loss of balance (supervision).    Outcome: Ongoing (interventions implemented as appropriate)  Goals remain appropriate

## 2017-10-07 NOTE — PLAN OF CARE
Problem: Patient Care Overview  Goal: Plan of Care Review  Outcome: Ongoing (interventions implemented as appropriate)   10/07/17 0016   Coping/Psychosocial   Plan Of Care Reviewed With patient       Problem: Fall Risk (Adult)  Goal: Absence of Falls  Patient will demonstrate the desired outcomes by discharge/transition of care.   Outcome: Ongoing (interventions implemented as appropriate)   10/07/17 0016   Fall Risk (Adult)   Absence of Falls making progress toward outcome

## 2017-10-07 NOTE — PT/OT/SLP PROGRESS
"Physical Therapy  Treatment    Otis Salcido   MRN: [id number]   Admitting Diagnosis: Nontraumatic intracerebral hemorrhage of cerebellum    PT Received On: 10/07/17  Total Time (min): 53       Billable Minutes:53    Gait [id number], Therapeutic Activity 13 and Therapeutic Exercise 25    Treatment Type: Treatment  PT/PTA: PTA     PTA Visit Number: 1       General Precautions: Standard, aspiration, fall  Orthopedic Precautions: N/A   Braces: N/A    Do you have any cultural, spiritual, [employer]an conflicts, given your current situation?: no    Subjective:  "feel good"      Pain/Comfort  Pain Rating 1: 0/10  Pain Rating Post-Intervention 1: 0/10    Objective:   Patient found with:  (in BSC)       Functional Status:  MDS G  Transfer Functional Status: S-SBA  Walk in Corridor Functional Status: S-SBA        Transfers:  Sit<>Stand: SBA with RW  Stand Pivot Transfer: SBA no AD BSC>WC using arm rest for stability      Gait:  Amb with RW close SBA ~ 200 ft x 2 trials, no LOB, improvement noted with stability negotiating turns and pivots back to wc    Advanced Gait:  Stairs: asc/yobany 4 6" steps with BHR SBA    Therex:  x15 reps standing with BUE support heel raises, mini squats,MIP    Balance:  Dynamic standing bal activity/hand eye coordination in II bars tapping balloon with BUE close SBA 3:15 sec, kicking small ball with BLE, BUE support CGA/close SBA ~ 2 minutes, catch/toss/bounce basket ball with CG/close SBA ~ 4 minutes    Additional Treatment:  LBE x 15 minutes    Patient left up in chair with call button in reach and belongings in reach.    Assessment:  Otis Salcido is a 67 y.o. male with a medical diagnosis of Nontraumatic intracerebral hemorrhage of cerebellum.  Pt tolerated well, continues to be challenged with dyn standing activities, pt would continue to benefit from skilled PT services to improve overall functional mobility, strength and endurance.  .    Rehab identified problem list/impairments: weakness, " impaired endurance, impaired self care skills, impaired functional mobilty, gait instability, decreased coordination, decreased upper extremity function, decreased lower extremity function, decreased safety awareness, impaired fine motor    Rehab potential is good.    Activity tolerance: Good    Discharge recommendations: home health PT     Barriers to discharge: Decreased caregiver support (lives alone)    Equipment recommendations: none     GOALS:    Physical Therapy Goals        Problem: Physical Therapy Goal    Goal Priority Disciplines Outcome Goal Variances Interventions   Physical Therapy Goal     PT/OT, PT Ongoing (interventions implemented as appropriate)     Description:  Goals to be met by: 2 weeks     Patient will increase functional independence with mobility by performin. Supine to sit with [place].  2. Sit to supine with [place].  3. Sit to stand transfer with [place].  4. Bed to chair transfer with [place].  5. Gait  x 300 feet with Modified [place] with/without an assistive device.  6. Ascend/descend 1 flight of stairs with right Handrails modified independence.  7. Stand for 5 minutes with modified independence while performing a task (playing ladder ball, throwing beach ball). Met (10/5/2017)  8. Lower extremity exercise program x 20 standing exercises with unilateral UE support (hip flexion, hip abduction, heel raises).   9. Pt will be able to ambulate in all directions x 20 feet to display the ability to change direction of ambulation without loss of balance (supervision).                     PLAN:    Patient to be seen  (5-6x/wk)  to address the above listed problems via gait training, therapeutic activities, therapeutic exercises, neuromuscular re-education  Plan of Care expires: 17  Plan of Care reviewed with:      Amira Salazar PTA  10/07/2017

## 2017-10-08 PROCEDURE — 12000000 HC SNF SEMI-PRIVATE ROOM

## 2017-10-08 PROCEDURE — 97110 THERAPEUTIC EXERCISES: CPT

## 2017-10-08 PROCEDURE — 25000003 PHARM REV CODE 250: Performed by: NURSE PRACTITIONER

## 2017-10-08 PROCEDURE — 97535 SELF CARE MNGMENT TRAINING: CPT

## 2017-10-08 PROCEDURE — 97530 THERAPEUTIC ACTIVITIES: CPT

## 2017-10-08 PROCEDURE — 25000003 PHARM REV CODE 250: Performed by: STUDENT IN AN ORGANIZED HEALTH CARE EDUCATION/TRAINING PROGRAM

## 2017-10-08 RX ADMIN — HYDRALAZINE HYDROCHLORIDE 50 MG: 50 TABLET ORAL at 09:10

## 2017-10-08 RX ADMIN — HYDRALAZINE HYDROCHLORIDE 50 MG: 50 TABLET ORAL at 02:10

## 2017-10-08 RX ADMIN — HYDRALAZINE HYDROCHLORIDE 50 MG: 50 TABLET ORAL at 06:10

## 2017-10-08 RX ADMIN — STANDARDIZED SENNA CONCENTRATE AND DOCUSATE SODIUM 1 TABLET: 8.6; 5 TABLET, FILM COATED ORAL at 12:10

## 2017-10-08 RX ADMIN — LEVETIRACETAM 250 MG: 250 TABLET, FILM COATED ORAL at 08:10

## 2017-10-08 RX ADMIN — ATORVASTATIN CALCIUM 40 MG: 20 TABLET, FILM COATED ORAL at 08:10

## 2017-10-08 RX ADMIN — LEVETIRACETAM 500 MG: 500 TABLET, FILM COATED ORAL at 06:10

## 2017-10-08 NOTE — PT/OT/SLP PROGRESS
Occupational Therapy  Treatment    Otis Salcido   MRN: [id number]   Admitting Diagnosis: Nontraumatic intracerebral hemorrhage of cerebellum    OT Date of Treatment: 10/08/17  Total Time (min): 50 min    Billable Minutes:  Self Care/Home Management 15, Therapeutic Activity 10 and Neuromuscular Re-education 25    General Precautions: Standard, aspiration, fall  Orthopedic Precautions: N/A  Braces: N/A         Subjective:  Communicated with nurse prior to session.      Pain/Comfort  Pain Rating 1: 0/10  Pain Rating Post-Intervention 1: 0/10    Objective:  Patient found with:  (no lines)    Functional Status:  MDS G  Transfer Functional Status: SBA (from bedside chair and W/C to standing with RW.  SPT with RW from bedside chair to W/C (S).)    Walk in Corridor Functional Status:  (Performed functional ambulation from his room to therapy  gym using RW a distance of 178ft with 2 instances of minor deviations but Pt able to self correct.Pt functionally ambulated back to his room from therapy gym 178ft with RW and (S).    Dressing Functional Status: SBA (UBD and LBD both require only supervision. Oscillation noted ((L) UE when attempting to reach for objects and fine motor aspects of activities but Pt successful and completed tasks.)    Moving from seated to standing position: Not steady, but able to stabilize without staff assistance  Turning around and facing the opposite direction while walking: Not steady, but able to stabilize without staff assistance  Surface-to-surface transfer (transfer between bed and chair or wheelchair): Not steady, but able to stabilize without staff assistance (RW to steady when standing.)    Additional Treatment:  (Performed functional ambulation from his room to therapy  gym using RW a distance of 178ft with 2 instances of minor deviations but Pt able to self correct.Pt functionally ambulated back to his room from therapy gym 178ft with RW and (S).      Patient left up in bedside chair with  call button in reach and nurse notified    ASSESSMENT:  Otis Salcido is a 67 y.o. male with a medical diagnosis of Nontraumatic intracerebral hemorrhage of cerebellum .  Pt was agreeable to OT and tolerated Tx without incident but continues to require (A) to perform functional activities to completion as well as to safely perform functional mobility and functional transfers.  Pt is making slow progress but continues to require OT Intervention to perform functional transfers, functional standing activities, neuro reeducation focusing on (L) UE speed and control when performing activities and self care tasks with standing components. OT is recommended to further his functional (I)ce and safety. Goals remain appropriate and continued OT is recommended.    Rehab identified problem list/impairments: weakness, impaired endurance, impaired sensation, impaired self care skills, impaired functional mobilty, gait instability, impaired balance, decreased coordination, decreased upper extremity function, decreased lower extremity function, decreased safety awareness, impaired coordination, impaired fine motor    Rehab potential is good    Activity tolerance: Good    Discharge recommendations: home health OT     Barriers to discharge: Decreased caregiver support     Equipment recommendations: bedside commode, bath bench     GOALS:    Occupational Therapy Goals        Problem: Occupational Therapy Goal    Goal Priority Disciplines Outcome Interventions   Occupational Therapy Goal     OT, PT/OT Ongoing (interventions implemented as appropriate)    Description:  Goals to be met by: 14 days     Patient will increase functional independence with ADLs by performing:    UE Dressing with Modified [place].  LE Dressing with Modified [place].  Grooming while standing with Modified [place].  Toileting from toilet with Modified [place] for hygiene and clothing management.   Bathing from  shower chair/bench with  Modified [place].  Toilet transfer to toilet with Modified [place].  Pt will demonstrate good dynamic standing balance during functional task lasting 10 minutes                      Plan:  Patient to be seen  (5-6x/week) to address the above listed problems via self-care/home management, therapeutic activities, therapeutic exercises, neuromuscular re-education  Plan of Care expires: 11/03/17  Plan of Care reviewed with: patient    FIDELINA Hernandez/CRISTOFER  10/08/2017

## 2017-10-08 NOTE — PLAN OF CARE
Problem: Patient Care Overview  Goal: Plan of Care Review  Outcome: Ongoing (interventions implemented as appropriate)   10/08/17 0150   Coping/Psychosocial   Plan Of Care Reviewed With patient       Problem: Fall Risk (Adult)  Goal: Absence of Falls  Patient will demonstrate the desired outcomes by discharge/transition of care.   Outcome: Ongoing (interventions implemented as appropriate)   10/08/17 0150   Fall Risk (Adult)   Absence of Falls making progress toward outcome

## 2017-10-08 NOTE — PLAN OF CARE
Problem: Occupational Therapy Goal  Goal: Occupational Therapy Goal  Goals to be met by: 14 days     Patient will increase functional independence with ADLs by performing:    UE Dressing with Modified [place].  LE Dressing with Modified [place].  Grooming while standing with Modified [place].  Toileting from toilet with Modified [place] for hygiene and clothing management.   Bathing from  shower chair/bench with Modified [place].  Toilet transfer to toilet with Modified [place].  Pt will demonstrate good dynamic standing balance during functional task lasting 10 minutes     Outcome: Ongoing (interventions implemented as appropriate)  Mir Chavez OTR/L      10/8/2017

## 2017-10-09 LAB
ANION GAP SERPL CALC-SCNC: 6 MMOL/L
BASOPHILS # BLD AUTO: 0.04 K/UL
BASOPHILS NFR BLD: 0.9 %
BUN SERPL-MCNC: 8 MG/DL
CALCIUM SERPL-MCNC: 9.1 MG/DL
CHLORIDE SERPL-SCNC: 106 MMOL/L
CO2 SERPL-SCNC: 27 MMOL/L
CREAT SERPL-MCNC: 0.8 MG/DL
DIFFERENTIAL METHOD: ABNORMAL
EOSINOPHIL # BLD AUTO: 0 K/UL
EOSINOPHIL NFR BLD: 0.9 %
ERYTHROCYTE [DISTWIDTH] IN BLOOD BY AUTOMATED COUNT: 13.1 %
EST. GFR  (AFRICAN AMERICAN): >60 ML/MIN/1.73 M^2
EST. GFR  (NON AFRICAN AMERICAN): >60 ML/MIN/1.73 M^2
GLUCOSE SERPL-MCNC: 85 MG/DL
HCT VFR BLD AUTO: 39.5 %
HGB BLD-MCNC: 12.4 G/DL
LYMPHOCYTES # BLD AUTO: 1.6 K/UL
LYMPHOCYTES NFR BLD: 36.1 %
MAGNESIUM SERPL-MCNC: 2.1 MG/DL
MCH RBC QN AUTO: 28.4 PG
MCHC RBC AUTO-ENTMCNC: 31.4 G/DL
MCV RBC AUTO: 91 FL
MONOCYTES # BLD AUTO: 0.5 K/UL
MONOCYTES NFR BLD: 11 %
NEUTROPHILS # BLD AUTO: 2.2 K/UL
NEUTROPHILS NFR BLD: 51.1 %
PHOSPHATE SERPL-MCNC: 3.2 MG/DL
PLATELET # BLD AUTO: 215 K/UL
PMV BLD AUTO: 12.2 FL
POTASSIUM SERPL-SCNC: 3.5 MMOL/L
RBC # BLD AUTO: 4.36 M/UL
SODIUM SERPL-SCNC: 139 MMOL/L
WBC # BLD AUTO: 4.38 K/UL

## 2017-10-09 PROCEDURE — 97535 SELF CARE MNGMENT TRAINING: CPT

## 2017-10-09 PROCEDURE — 97116 GAIT TRAINING THERAPY: CPT

## 2017-10-09 PROCEDURE — 97112 NEUROMUSCULAR REEDUCATION: CPT

## 2017-10-09 PROCEDURE — 36415 COLL VENOUS BLD VENIPUNCTURE: CPT

## 2017-10-09 PROCEDURE — 84100 ASSAY OF PHOSPHORUS: CPT

## 2017-10-09 PROCEDURE — 85025 COMPLETE CBC W/AUTO DIFF WBC: CPT

## 2017-10-09 PROCEDURE — 97803 MED NUTRITION INDIV SUBSEQ: CPT

## 2017-10-09 PROCEDURE — 25000003 PHARM REV CODE 250: Performed by: STUDENT IN AN ORGANIZED HEALTH CARE EDUCATION/TRAINING PROGRAM

## 2017-10-09 PROCEDURE — 12000000 HC SNF SEMI-PRIVATE ROOM

## 2017-10-09 PROCEDURE — 97530 THERAPEUTIC ACTIVITIES: CPT

## 2017-10-09 PROCEDURE — 25000003 PHARM REV CODE 250: Performed by: NURSE PRACTITIONER

## 2017-10-09 PROCEDURE — 83735 ASSAY OF MAGNESIUM: CPT

## 2017-10-09 PROCEDURE — 80048 BASIC METABOLIC PNL TOTAL CA: CPT

## 2017-10-09 PROCEDURE — 97110 THERAPEUTIC EXERCISES: CPT

## 2017-10-09 RX ORDER — AMOXICILLIN 250 MG
1 CAPSULE ORAL DAILY
Status: DISCONTINUED | OUTPATIENT
Start: 2017-10-10 | End: 2017-10-17 | Stop reason: HOSPADM

## 2017-10-09 RX ADMIN — ATORVASTATIN CALCIUM 40 MG: 20 TABLET, FILM COATED ORAL at 09:10

## 2017-10-09 RX ADMIN — LEVETIRACETAM 500 MG: 500 TABLET, FILM COATED ORAL at 06:10

## 2017-10-09 RX ADMIN — HYDRALAZINE HYDROCHLORIDE 50 MG: 50 TABLET ORAL at 09:10

## 2017-10-09 RX ADMIN — LEVETIRACETAM 250 MG: 250 TABLET, FILM COATED ORAL at 09:10

## 2017-10-09 RX ADMIN — HYDRALAZINE HYDROCHLORIDE 50 MG: 50 TABLET ORAL at 01:10

## 2017-10-09 RX ADMIN — LISINOPRIL 40 MG: 20 TABLET ORAL at 09:10

## 2017-10-09 RX ADMIN — HYDRALAZINE HYDROCHLORIDE 50 MG: 50 TABLET ORAL at 06:10

## 2017-10-09 RX ADMIN — AMLODIPINE BESYLATE 10 MG: 10 TABLET ORAL at 09:10

## 2017-10-09 NOTE — PT/OT/SLP PROGRESS
"Physical Therapy  Treatment    Otis Salcido   MRN: [id number]   Admitting Diagnosis: Nontraumatic intracerebral hemorrhage of cerebellum    PT Received On: 10/09/17  Total Time (min): 57       Billable Minutes:57    Gait [id number], Therapeutic Activity 10, Therapeutic Exercise 15 and Neuromuscular Re-education 17    Treatment Type: Treatment  PT/PTA: PTA     PTA Visit Number: 2       General Precautions: Standard, aspiration, fall  Orthopedic Precautions: N/A   Braces: N/A    Do you have any cultural, spiritual, Religion conflicts, given your current situation?: no    Subjective:  "alright"      Pain/Comfort  Pain Rating 1: 0/10  Pain Rating Post-Intervention 1: 0/10    Objective:   Patient found with:  (in bed)       Functional Status:  MDS G  Bed Mobility Functional Status: mod(I) - (I)  Transfer Functional Status: S-SBA  Walk in Corridor Functional Status: CGA-Min (A)          Bed Mobility:  Sit>Supine: I with HOB flat no rail  Supine>Sit: I with HOB flat no rail    Transfers:  Sit<>Stand: with RW SBA  Stand Pivot Transfer: no AD SBA EOB>WC<>EOM vcs for safety/positioning    Gait:  Amb with with RW ~ 150 ft close SBA, CGA with negotiating turn   Amb with RW with x2 #2 wts on RW ~ 100ft, 2* to ataxia     Advanced Gait:  Stairs: asc/yobany 4 steps x 3 trials (12 steps) with RHR SBA    Balance:  Dynamic sitting EOM CGA/SBA catch/toss basket ball, tapping balloon with BUE SBA occ CGA, sitting in WC kicking small ball with BLE,  EOM  Pt placing rings on tree out of reach with BUE  Dynamic standing activity with RW min/CGA catching/tossing basket ball, tapping balloon with BUE  Also place #1 wt on wrist with some improvement noted with control mvmts    Additional Treatment:  LBE x 15 minutes    Patient left up in chair with call button in reach and belongings in reach.    Assessment:  Otis Salcido is a 67 y.o. male with a medical diagnosis of Nontraumatic intracerebral hemorrhage of cerebellum.  Pt tolerated well, pt " would continue to benefit from skilled PT services to improve overall functional mobility, strength and endurance.  .    Rehab identified problem list/impairments: weakness, impaired endurance, impaired self care skills, impaired functional mobilty, gait instability, decreased coordination, decreased upper extremity function, decreased lower extremity function, decreased safety awareness, impaired fine motor    Rehab potential is good.    Activity tolerance: Fair    Discharge recommendations: home health PT     Barriers to discharge: Decreased caregiver support (lives alone)    Equipment recommendations: none     GOALS:    Physical Therapy Goals        Problem: Physical Therapy Goal    Goal Priority Disciplines Outcome Goal Variances Interventions   Physical Therapy Goal     PT/OT, PT Ongoing (interventions implemented as appropriate)     Description:  Goals to be met by: 2 weeks     Patient will increase functional independence with mobility by performin. Supine to sit with [place]. met  2. Sit to supine with [place]. met  3. Sit to stand transfer with [place].  4. Bed to chair transfer with [place].  5. Gait  x 300 feet with Modified [place] with/without an assistive device.  6. Ascend/descend 1 flight of stairs with right Handrails modified independence.  7. Stand for 5 minutes with modified independence while performing a task (playing ladder ball, throwing beach ball). Met (10/5/2017)  8. Lower extremity exercise program x 20 standing exercises with unilateral UE support (hip flexion, hip abduction, heel raises).   9. Pt will be able to ambulate in all directions x 20 feet to display the ability to change direction of ambulation without loss of balance (supervision).                      PLAN:    Patient to be seen  (5-6x/wk)  to address the above listed problems via gait training, therapeutic activities, therapeutic exercises, neuromuscular re-education  Plan of Care expires:  11/03/17  Plan of Care reviewed with:      Amira Salazar, BUCK  10/09/2017

## 2017-10-09 NOTE — PLAN OF CARE
Problem: Physical Therapy Goal  Goal: Physical Therapy Goal  Goals to be met by: 2 weeks     Patient will increase functional independence with mobility by performin. Supine to sit with [place]. met  2. Sit to supine with [place]. met  3. Sit to stand transfer with [place].  4. Bed to chair transfer with [place].  5. Gait  x 300 feet with Modified [place] with/without an assistive device.  6. Ascend/descend 1 flight of stairs with right Handrails modified independence.  7. Stand for 5 minutes with modified independence while performing a task (playing ladder ball, throwing beach ball). Met (10/5/2017)  8. Lower extremity exercise program x 20 standing exercises with unilateral UE support (hip flexion, hip abduction, heel raises).   9. Pt will be able to ambulate in all directions x 20 feet to display the ability to change direction of ambulation without loss of balance (supervision).    Outcome: Ongoing (interventions implemented as appropriate)  Goals remain  appropriate

## 2017-10-09 NOTE — PLAN OF CARE
Problem: Occupational Therapy Goal  Goal: Occupational Therapy Goal  Goals to be met by: 14 days     Patient will increase functional independence with ADLs by performing:    UE Dressing with Modified [place]. GOAL MET 10/09/17  LE Dressing with Modified [place].  Grooming while standing with Modified [place].  Toileting from toilet with Modified [place] for hygiene and clothing management.   Bathing from  shower chair/bench with Modified [place].  Toilet transfer to toilet with Modified [place].  Pt will demonstrate good dynamic standing balance during functional task lasting 10 minutes GOAL MET 10/09/17       Outcome: Ongoing (interventions implemented as appropriate)    Pt was agreeable to OT and was noted to make progress towards his goals in therapy. During today's session, pt met 2 goals for UE dressing and standing balance.  Pt's goals remain appropriate at this time.  He will continue to benefit from skilled OT services in order to assist him with increasing his safety and level of independence with self care and mobility tasks.     Key Taylor, OT  10/9/2017

## 2017-10-09 NOTE — PROGRESS NOTES
"[facility]  Adult Nutrition  Progress Note    SUMMARY     Recommendations  Continue current cardiac diet  Goals: > 85% of EEN/EPN  Nutrition Goal Status: progressing towards goal       Continuum of Care Plan         Reason for Assessment    Reason for Assessment: RD follow-up  Diagnosis:  (Neutramatic intracerebral hemmorrhage of cerebellum)  Relevent Medical History: HTN,HTL, prostate cancer         General Information Comments: Pt states no issues chewing or swallowing. Pt states decreased appetite.     Nutrition Discharge Planning: DC on Cardiac diet    Nutrition Prescription Ordered    Current Diet Order: Cardiac              Oral Nutrition Supplement: none     Evaluation of Received Nutrients/Fluid Intake                                           Energy Calories Required: meeting needs                 Protein Required: meeting needs                              Fluid Required: meeting needs     Tolerance: tolerating     % Intake of Estimated Energy Needs: 75 - 100 %  % Meal Intake: 75%     Nutrition Risk Screen     Nutrition Risk Screen: no indicators present    Nutrition/Diet History    Patient Reported Diet/Restrictions/Preferences: general     Food Preferences: No Confucianist or cultural food preferences                         Labs/Tests/Procedures/Meds       Pertinent Labs Reviewed: other (see comments)  Pertinent Labs Comments: potassium 3.4, albumin 3.1  Pertinent Medications Reviewed: reviewed, pertinent  Pertinent Medications Comments: senna-ducosate    Physical Findings    Overall Physical Appearance: nourished        Skin: intact    Anthropometrics    Temp: 97.4 °F (36.3 °C)     Height: 5' 7.01" (170.2 cm)  Weight Method: Standard Scale  Weight: 74.3 kg (163 lb 12.8 oz)  Ideal Body Weight (IBW), Male: 148.06 lb     % Ideal Body Weight, Male (lb): 110.63 lb     BMI (Calculated): 25.7  BMI Grade: 25 - 29.9 - overweight                            Estimated/Assessed Needs    Weight Used " For Calorie Calculations: 74.3 kg (163 lb 12.8 oz)   Height (cm): 170.2 cm  Energy Calorie Requirements (kcal): 1833kcal/day (mifflin x 1.25(PAL))  Energy Need Method: [place]-St Jeor       RMR ([place]-St. Jeor Equation): 1466.75           Protein Requirements: 82-99 grams protein/ day (1.0-1.2 gm/kg)    Fluid Requirements (mL): Or per MD  Fluid Need Method: RDA Method    RDA Method (mL): 1833             Assessment and Plan    Inadequate energy intake related to decreased appetite as evidence by 25% PO intake.     Monitor and Evaluation    Food and Nutrient Intake: food and beverage intake   Physical Activity and Function: nutrition-related ADLs and IADLs  Anthropometric Measurements: weight, weight change  Biochemical Data, Medical Tests and Procedures: electrolyte and renal panel, gastrointestinal profile, glucose/endocrine profile, inflammatory profile       Nutrition Risk    Level of Risk:  (follow one time weekly)    Nutrition Follow-Up    RD Follow-up?: Yes

## 2017-10-09 NOTE — PT/OT/SLP PROGRESS
"Occupational Therapy  Treatment    Otis Salcido   MRN: [id number]   Admitting Diagnosis: Nontraumatic intracerebral hemorrhage of cerebellum    OT Date of Treatment: 10/09/17  Total Time (min): 70 min    Billable Minutes:  Self Care/Home Management 60 and Therapeutic Activity 10    General Precautions: Standard, aspiration, fall  Orthopedic Precautions: N/A  Braces: N/A         Subjective:  Communicated with nurse prior to session.  "I just need a minute to get ready."  Pt statement before starting - pt occasionally stopped and closed eyes during ADLs in both sit and stand.  When asked why, pt would say nothing.  OT asked if pt was in pain or dizzy and pt reported dizzy once.     Pain/Comfort  Pain Rating 1: 0/10  Pain Rating Post-Intervention 1: 0/10    Objective:  Patient found with:  (no lines - sitting in bedside chair)    Functional Status:  MDS G  Transfer Functional Status: S-SBA  Transfer Level of (A):  (SBA using RW to transfer to chair, toilet, and shower chair)    Walk in Room Functional Status: S-SBA  Walk In Room Level of (A):  (SBA with RW)    Walk in Corridor Functional Status: CGA-Min (A)  Walk In Corridor Level of (A):  (CGA using RW - pt with 2 episodes of unsteadiness - veers L and would lock R elbow in extension to improve control of RW - CGA given due to pt report of feeling dizzy once in session )    Dressing Functional Status: 1: S-SBA  Dressing Level of (A) :  (UE = mod I in sitting pt was able to fasten buttons on shirt with increased time; LE = SBA for safety in standing when pulling pants up to waist)    Eating Functional Status: mod(I) - (I)  Eating Level of (A) :  (I with meal)    Toilet Use Functional Status: S-SBA  Toilet Use Level of (A) :  (S to complete toileting)    Personal Hygiene Functional Status: S-SBA  Personal Hygiene Level of (A) :  (S for grooming in standing - shaving, brushing teeth, washing face and hands)    Bathing Functional Status: S-SBA  Bathing Level of (A) :  (S " for bathing in shower room for safety - used GB to stand at times when washing/rinsing/drying perineal and buttocks)    Moving from seated to standing position: Not steady, but able to stabilize without staff assistance  Walking (with assistive device if used): Not steady, only able to stabilize with staff assistance  Turning around and facing the opposite direction while walking: Not steady, only able to stabilize with staff assistance  Moving on and off the toilet: Not steady, but able to stabilize without staff assistance  Surface-to-surface transfer (transfer between bed and chair or wheelchair): Not steady, but able to stabilize without staff assistance          Additional Treatment:  · Pt completed ADLs and func mobility activities for tx session as noted above  · Pt needed SBA to CGA frequently during standing tasks due to pt's report of feeling dizzy - pt hesitated at times during tx session and closed eyes - occurred in sit and stand  · Pt noted with oscillations of L UE during functional grasp and reach using this extremity but still able to functionally complete tasks  · Pt noted with 1 episode of clonus on L leg while seated on shower bench while bathing   · Pt educated on role of OT and POC      Patient left up in chair with call button in reach    ASSESSMENT:  Otis Salcido is a 67 y.o. male with a medical diagnosis of Nontraumatic intracerebral hemorrhage of cerebellum.   Pt was agreeable to OT and was noted to make progress towards his goals in therapy. During today's session, pt met 2 goals for UE dressing and standing balance.  Pt's goals remain appropriate at this time.  He continues to demonstrate ataxic/apraxic movements of L UE and was noted to demonstrate clonus on his L LE during bathing.  Regardless of this additional challenge, pt is able to perform all ADLs with supervision to CGA. Pt's main barrier for a safe transition home is his risk for falls due to this uncontrollable movements and  lack of supervision/assist at home.  He will continue to benefit from skilled OT services in order to assist him with increasing his safety and level of independence with self care and mobility tasks.         Rehab identified problem list/impairments: weakness, impaired endurance, impaired sensation, impaired self care skills, impaired functional mobilty, gait instability, impaired balance, decreased coordination, decreased upper extremity function, decreased lower extremity function, decreased safety awareness, impaired coordination, impaired fine motor    Rehab potential is good    Activity tolerance: Good    Discharge recommendations: home health OT     Barriers to discharge: Decreased caregiver support     Equipment recommendations: bedside commode, bath bench     GOALS:    Occupational Therapy Goals        Problem: Occupational Therapy Goal    Goal Priority Disciplines Outcome Interventions   Occupational Therapy Goal     OT, PT/OT Ongoing (interventions implemented as appropriate)    Description:  Goals to be met by: 14 days     Patient will increase functional independence with ADLs by performing:    UE Dressing with Modified [place]. GOAL MET 10/09/17  LE Dressing with Modified [place].  Grooming while standing with Modified [place].  Toileting from toilet with Modified [place] for hygiene and clothing management.   Bathing from  shower chair/bench with Modified [place].  Toilet transfer to toilet with Modified [place].  Pt will demonstrate good dynamic standing balance during functional task lasting 10 minutes GOAL MET 10/09/17                         Plan:  Patient to be seen  (5-6x/week) to address the above listed problems via self-care/home management, therapeutic activities, therapeutic exercises, neuromuscular re-education  Plan of Care expires: 11/03/17  Plan of Care reviewed with: patient    Key POWER Brandon, OT  10/09/2017

## 2017-10-09 NOTE — PLAN OF CARE
Problem: Fall Risk (Adult)  Goal: Absence of Falls  Patient will demonstrate the desired outcomes by discharge/transition of care.    10/08/17 2000   Fall Risk (Adult)   Absence of Falls making progress toward outcome   Pt remain free from falls/injury/trauma. Call light w/i reach. Will monitor

## 2017-10-10 PROCEDURE — 25000003 PHARM REV CODE 250: Performed by: STUDENT IN AN ORGANIZED HEALTH CARE EDUCATION/TRAINING PROGRAM

## 2017-10-10 PROCEDURE — 97116 GAIT TRAINING THERAPY: CPT

## 2017-10-10 PROCEDURE — 99900058 HC 022 PAID UNDER SNF PPS

## 2017-10-10 PROCEDURE — 25000003 PHARM REV CODE 250: Performed by: NURSE PRACTITIONER

## 2017-10-10 PROCEDURE — 97112 NEUROMUSCULAR REEDUCATION: CPT

## 2017-10-10 PROCEDURE — 12000000 HC SNF SEMI-PRIVATE ROOM

## 2017-10-10 PROCEDURE — 97530 THERAPEUTIC ACTIVITIES: CPT

## 2017-10-10 PROCEDURE — 97110 THERAPEUTIC EXERCISES: CPT

## 2017-10-10 RX ADMIN — ATORVASTATIN CALCIUM 40 MG: 20 TABLET, FILM COATED ORAL at 09:10

## 2017-10-10 RX ADMIN — LEVETIRACETAM 250 MG: 250 TABLET, FILM COATED ORAL at 09:10

## 2017-10-10 RX ADMIN — HYDRALAZINE HYDROCHLORIDE 50 MG: 50 TABLET ORAL at 06:10

## 2017-10-10 RX ADMIN — LEVETIRACETAM 500 MG: 500 TABLET, FILM COATED ORAL at 06:10

## 2017-10-10 NOTE — PLAN OF CARE
Problem: Physical Therapy Goal  Goal: Physical Therapy Goal  Goals to be met by: 2 weeks     Patient will increase functional independence with mobility by performin. Supine to sit with [place]. met  2. Sit to supine with [place]. met  3. Sit to stand transfer with [place].  4. Bed to chair transfer with [place].  5. Gait  x 300 feet with Modified [place] with/without an assistive device.  6. Ascend/descend 1 flight of stairs with right Handrails modified independence.  7. Stand for 5 minutes with modified independence while performing a task (playing ladder ball, throwing beach ball). Met (10/5/2017)  8. Lower extremity exercise program x 20 standing exercises with unilateral UE support (hip flexion, hip abduction, heel raises).   9. Pt will be able to ambulate in all directions x 20 feet to display the ability to change direction of ambulation without loss of balance (supervision).     Outcome: Ongoing (interventions implemented as appropriate)  Goals remain appropriate

## 2017-10-10 NOTE — PT/OT/SLP PROGRESS
Physical Therapy  Treatment    Otis Salcido   MRN: [id number]   Admitting Diagnosis: Nontraumatic intracerebral hemorrhage of cerebellum    PT Received On: 10/10/17  Total Time (min): 38       Billable Minutes:  Gait [id number], Therapeutic Exercise 15 and Neuromuscular Re-education 8    Treatment Type: Treatment  PT/PTA: PT     PTA Visit Number: 2       General Precautions: Standard, aspiration, fall  Orthopedic Precautions: N/A   Braces: N/A    Do you have any cultural, spiritual, Roman Catholic conflicts, given your current situation?: no    Subjective:  Communicated with pt prior to session.  Pt agreeable to PT    Pain/Comfort  Pain Rating 1: 0/10  Pain Rating Post-Intervention 1: 0/10    Objective:  Patient found sitting in chair at bedside with  nurse present       Functional Status:  MDS G  Transfer Functional Status: S-SBA  Walk in Room Functional Status: S-SBA  Walk in Corridor Functional Status: CGA-Min (A)  Locomotion on Unit Functional Status: CGA-Min (A)  Moving from seated to standing position: Not steady, but able to stabilize without staff assistance  Walking (with assistive device if used): Not steady, only able to stabilize with staff assistance  Turning around and facing the opposite direction while walking: Not steady, only able to stabilize with staff assistance  Surface-to-surface transfer (transfer between bed and chair or wheelchair): Not steady, but able to stabilize without staff assistance        Transfers:  Sit<>Stand: supervision from wheelchair x4 trials and chair x1 trial    Gait:  Amb 130 feet x2 trials without AD with close SBA and occasional CGA due to unsteady gait with lateral lean and varying gait speeds requiring skilled verbal instruction for controlled speeds and focus on foot placement, coordination, and sequencing    Pt was able to ambulation approx 5 feet from wheelchair to chair in room without AD with SBA/supervision    Advanced Gait:  Stairs: 2x4 using B rails with supervision  with step to pattern and good stability demonstrated      Therex:  Standing therex including: BUE support  Heel raises 20  Mini squats 20  HS curls 20  Hip abduction 20  Hip extension 20  Marching 20  PT provided instruction for proper technique    Balance:    Standing Balance  Duration- 5 min during standing therex  Assistance- SBA/supervision with pt using BUE support  Demonstrated- ability to perform all standing therex without rest break with good stability during dynamic activity  Instruction provided- adjusting LJ for increased stability    Side stepping 20 feet R and L without AD with close SBA and instruction to achieve and maintain full upright posture  Forward and backward stepping 10 feet for 2 trials with standing rest break requiring CGA due to unsteadiness with backward stepping and skilled verbal instruction to slow gina with focus on foot placement    Additional Treatment:  LBE x5 min (2.5 min forward and 2.5 min backward) to improve coordination and endurance.    Patient left up in chair with call button in reach.    Assessment:  Otis Salcido is a 67 y.o. male with a medical diagnosis of Nontraumatic intracerebral hemorrhage of cerebellum.  Pt continues to demonstrate impaired coordination affecting safety and independence with ambulation without AD due to impaired balance. Pt demonstrates good activity tolerance and motivation to improve. Pt will benefit from continued PT treatment to address remaining impairments and improve functional mobility and independence.       Rehab identified problem list/impairments: weakness, impaired endurance, impaired self care skills, impaired functional mobilty, gait instability, decreased coordination, decreased upper extremity function, decreased lower extremity function, decreased safety awareness, impaired fine motor    Rehab potential is good.    Activity tolerance: Good    Discharge recommendations: home health PT     Barriers to discharge: Decreased  caregiver support (lives alone)    Equipment recommendations: none     GOALS:    Physical Therapy Goals        Problem: Physical Therapy Goal    Goal Priority Disciplines Outcome Goal Variances Interventions   Physical Therapy Goal     PT/OT, PT Ongoing (interventions implemented as appropriate)     Description:  Goals to be met by: 2 weeks     Patient will increase functional independence with mobility by performin. Supine to sit with [place]. met  2. Sit to supine with [place]. met  3. Sit to stand transfer with [place].  4. Bed to chair transfer with [place].  5. Gait  x 300 feet with Modified [place] with/without an assistive device.  6. Ascend/descend 1 flight of stairs with right Handrails modified independence.  7. Stand for 5 minutes with modified independence while performing a task (playing ladder ball, throwing beach ball). Met (10/5/2017)  8. Lower extremity exercise program x 20 standing exercises with unilateral UE support (hip flexion, hip abduction, heel raises).   9. Pt will be able to ambulate in all directions x 20 feet to display the ability to change direction of ambulation without loss of balance (supervision).                      PLAN:    Patient to be seen  (5-6x/wk)  to address the above listed problems via gait training, therapeutic activities, therapeutic exercises, neuromuscular re-education  Plan of Care expires: 17  Plan of Care reviewed with:      Zoe Larios, PT  10/10/2017

## 2017-10-10 NOTE — PLAN OF CARE
Problem: Fall Risk (Adult)  Goal: Identify Related Risk Factors and Signs and Symptoms  Related risk factors and signs and symptoms are identified upon initiation of Human [employer] Clinical Practice Guideline (CPG)   Outcome: Ongoing (interventions implemented as appropriate)   10/10/17 0045   Fall Risk   Related Risk Factors (Fall Risk) fatigue/slow reaction;gait/mobility problems

## 2017-10-10 NOTE — PT/OT/SLP PROGRESS
"Occupational Therapy  Treatment    Otis Salcido   MRN: [id number]   Admitting Diagnosis: Nontraumatic intracerebral hemorrhage of cerebellum    OT Date of Treatment: 10/10/17  Total Time (min): 45 min    Billable Minutes:  Therapeutic Activity 10 and Neuromuscular Re-education 35    General Precautions: Standard, aspiration, fall  Orthopedic Precautions: N/A  Braces: N/A         Subjective:  Communicated with nurse prior to session.  "I'm feeling pretty good."    Pain/Comfort  Pain Rating 1: 0/10  Pain Rating Post-Intervention 1: 0/10    Objective:  Patient found with:  (no lines - sitting in bedside chair)    Functional Status:  MDS G  Transfer Functional Status: S-SBA  Transfer Level of (A):  (S using RW for sit to stand)    Walk in Room Functional Status: S-SBA  Walk In Room Level of (A):  (S using RW)    Walk in Corridor Functional Status: S-SBA  Walk In Corridor Level of (A):  (SBA using RW - pt walked from room <-> therapy gym - pt continues to tighten grasp of R hand and lock R elbow in extension with increased distance in order to compensate for decreased coordination of L hand on RW - pt also noted with R shoulder raised while ambulating - pt responded to cues to relax R shoulder and elbow for short durations at a time)    Moving from seated to standing position: Not steady, but able to stabilize without staff assistance  Walking (with assistive device if used): Not steady, but able to stabilize without staff assistance  Turning around and facing the opposite direction while walking: Not steady, but able to stabilize without staff assistance  Moving on and off the toilet: Not steady, but able to stabilize without staff assistance  Surface-to-surface transfer (transfer between bed and chair or wheelchair): Not steady, but able to stabilize without staff assistance        Additional Treatment:  · Pt noted with improved ambulatory skills - able to ambulate from room <-> gym with SBA today (CGA yesterday due to " dizziness)  · Pt continues to present with L UE oscillations which impede his control during FM tasks associated with ADLs (buttons, opening items for grooming/meal, etc.), IADLs (managing change, keys), and functional mobility (grasp on RW).  Pt worked on the following tasks to manage the strength, coordination and control of his L UE:  · UB Ergometer with high resistance for 15 minutes - resistance increased for greater proprioceptive input - UBE selected for B UE integration and coordination  · Pt stood for ~12 minutes for FM activity:  Activity began with WB on L hand (UE aligned) while R hand placed checkers on board for ~4 minutes.  Pt then shifted weight to R hand and picked up checkers to flip them onto opposite end for inhand manipulation. Then, pt placed checkers back into a container located on his L side and R side.  Pt began activity with no weight on L UE but then a 3# wrist weight was added for proprioceptive input and strengthening.  Pt noted with oscillations of L UE throughout task but noted with gradual decline in size and frequency with repetition, proximal reach, and when working on ipsilateral activities (greater oscillations noted when crossing midline and moving distally).    · Pt worked on AROM of B UEs of shoulder with 3# weight on L wrist - pt completed 1 set of 15 reps of shoulder flexion, shoulder abduction, and shoulder horizontal adduction.  Movements broken down into 45 degree increments up to endrange (ex: shoulder flexion 45 -> 90 -> 135 -> 180)  for motor control.  Pt noted with decreased oscillations with flex and abduction with return during hor adduction (crossing midline).  · Pt encouraged to perform the completed exercises frequently in his room with water bottle to be used as light weight for resistance.     Patient left up in chair with call button in reach    ASSESSMENT:  Otis Salcido is a 67 y.o. male with a medical diagnosis of Nontraumatic intracerebral hemorrhage of  cerebellum.  Pt was agreeable to OT and was noted to make progress towards his goals in therapy. Pt continues to have deficits of motor control/coordination of L UE with presence of motor oscillations which appeared to decrease with proprioceptive input (weight), proximal tasks, ipsilateral activities, and repetition.  Pt worked on exercises to improve this deficit in order to work with promoting higher level independence with ADLs, IADLs and functional mobility as he lives alone and wants to return to his PLOF.   Pt's goals remain appropriate at this time.  He will continue to benefit from skilled OT services in order to assist him with increasing his safety and level of independence with self care and mobility tasks.       Rehab identified problem list/impairments: weakness, impaired endurance, impaired sensation, impaired self care skills, impaired functional mobilty, gait instability, impaired balance, decreased coordination, decreased upper extremity function, decreased lower extremity function, decreased safety awareness, impaired coordination, impaired fine motor    Rehab potential is good    Activity tolerance: Good    Discharge recommendations: home health OT     Barriers to discharge: Decreased caregiver support     Equipment recommendations: bedside commode, bath bench     GOALS:    Occupational Therapy Goals        Problem: Occupational Therapy Goal    Goal Priority Disciplines Outcome Interventions   Occupational Therapy Goal     OT, PT/OT Ongoing (interventions implemented as appropriate)    Description:  Goals to be met by: 14 days     Patient will increase functional independence with ADLs by performing:    UE Dressing with Modified [place]. GOAL MET 10/09/17  LE Dressing with Modified [place].  Grooming while standing with Modified [place].  Toileting from toilet with Modified [place] for hygiene and clothing management.   Bathing from  shower chair/bench with Modified  [place].  Toilet transfer to toilet with Modified [place].  Pt will demonstrate good dynamic standing balance during functional task lasting 10 minutes GOAL MET 10/09/17                         Plan:  Patient to be seen  (5-6x/week) to address the above listed problems via self-care/home management, therapeutic activities, therapeutic exercises, neuromuscular re-education  Plan of Care expires: 11/03/17  Plan of Care reviewed with: patient    Key Taylor, OT  10/10/2017

## 2017-10-10 NOTE — PLAN OF CARE
Problem: Occupational Therapy Goal  Goal: Occupational Therapy Goal  Goals to be met by: 14 days     Patient will increase functional independence with ADLs by performing:    UE Dressing with Modified [place]. GOAL MET 10/09/17  LE Dressing with Modified [place].  Grooming while standing with Modified [place].  Toileting from toilet with Modified [place] for hygiene and clothing management.   Bathing from  shower chair/bench with Modified [place].  Toilet transfer to toilet with Modified [place].  Pt will demonstrate good dynamic standing balance during functional task lasting 10 minutes GOAL MET 10/09/17        Outcome: Ongoing (interventions implemented as appropriate)    Pt was agreeable to OT and was noted to make progress towards his goals in therapy.  Pt's goals remain appropriate at this time.  He will continue to benefit from skilled OT services in order to assist him with increasing his safety and level of independence with self care and mobility tasks.     Key Taylor, OT  10/10/2017

## 2017-10-11 PROCEDURE — 12000000 HC SNF SEMI-PRIVATE ROOM

## 2017-10-11 PROCEDURE — 97110 THERAPEUTIC EXERCISES: CPT

## 2017-10-11 PROCEDURE — 25000003 PHARM REV CODE 250: Performed by: STUDENT IN AN ORGANIZED HEALTH CARE EDUCATION/TRAINING PROGRAM

## 2017-10-11 PROCEDURE — 97530 THERAPEUTIC ACTIVITIES: CPT

## 2017-10-11 PROCEDURE — 97112 NEUROMUSCULAR REEDUCATION: CPT

## 2017-10-11 PROCEDURE — 97116 GAIT TRAINING THERAPY: CPT

## 2017-10-11 RX ADMIN — ATORVASTATIN CALCIUM 40 MG: 20 TABLET, FILM COATED ORAL at 08:10

## 2017-10-11 RX ADMIN — LEVETIRACETAM 250 MG: 250 TABLET, FILM COATED ORAL at 08:10

## 2017-10-11 RX ADMIN — LEVETIRACETAM 500 MG: 500 TABLET, FILM COATED ORAL at 06:10

## 2017-10-11 RX ADMIN — LEVETIRACETAM 250 MG: 250 TABLET, FILM COATED ORAL at 06:10

## 2017-10-11 NOTE — PT/OT/SLP PROGRESS
"Physical Therapy  Treatment    Otis Salcido   MRN: [id number]   Admitting Diagnosis: Nontraumatic intracerebral hemorrhage of cerebellum    PT Received On: 10/11/17  Total Time (min): 60       Billable Minutes:60    Gait [id number], Therapeutic Exercise 20 and Neuromuscular Re-education 15    Treatment Type: Treatment  PT/PTA: PTA     PTA Visit Number: 1       General Precautions: Standard, aspiration, fall  Orthopedic Precautions: N/A   Braces: N/A    Do you have any cultural, spiritual, Restoration conflicts, given your current situation?: no    Subjective:  "feeling OK"      Pain/Comfort  Pain Rating 1: 0/10  Pain Rating Post-Intervention 1: 0/10    Objective:  Patient found   In wc     Functional Status:  MDS G  Bed Mobility Functional Status: S-SBA  Transfer Functional Status: S-SBA  Walk in Corridor Functional Status: CGA-Min (A)          Bed Mobility:  Sit>Supine:S/mod I  Supine>Sit: S/mod I in time  Getting in prone S inc time    Transfers:  Sit>stand with RW S  Std>sit SBA vcs for safety, completely infront of chair, control descent  Stand Pivot Transfer: SBA/S WC<>EOM no AD      Gait:  Amb with RW  ~283 ft close S vcs for inc BUE wt bearing on RW for proprioception and staying inside RW when negotiating turns  Amb with HHA/min A ~ 150 ft and 150 ft without HHA to facilitate B arm swing 150 ft min A for stability    Therex:  Side lying  L hip abd 3x10 reps  Prone hip ext B x10 reps  Standing x15 reps with RUE support CGA B hip abd/add/ext, heel raises     Balance:  Dynamic sitting balance activity at EOM: SBA>CGA reaching out of LJ (misses ball ~10 % of the time)  Catch/toss basket ball and lighter green ball  with under hand and over hand   Tapping balloon with BUE  Bouncing ball while sitting on mat between legs R hand x6,x16,x39 L hand x5,x5,x6    Patient left up in chair with call button in reach and belongings in reach.    Assessment:  Otis Salcido is a 67 y.o. male with a medical diagnosis of " Nontraumatic intracerebral hemorrhage of cerebellum.  Pt tolerated well, continues to show improvement with overall control/coordination with dynamic activities,  pt would continue to benefit from skilled PT services to improve overall functional mobility, strength and endurance.  .    Rehab identified problem list/impairments: weakness, impaired endurance, impaired self care skills, impaired functional mobilty, gait instability, decreased coordination, decreased upper extremity function, decreased lower extremity function, decreased safety awareness, impaired fine motor    Rehab potential is good.    Activity tolerance: Fair    Discharge recommendations: home health PT     Barriers to discharge: Decreased caregiver support (lives alone)    Equipment recommendations: none     GOALS:    Physical Therapy Goals        Problem: Physical Therapy Goal    Goal Priority Disciplines Outcome Goal Variances Interventions   Physical Therapy Goal     PT/OT, PT Ongoing (interventions implemented as appropriate)     Description:  Goals to be met by: 2 weeks     Patient will increase functional independence with mobility by performin. Supine to sit with [place]. met  2. Sit to supine with [place]. met  3. Sit to stand transfer with [place].  4. Bed to chair transfer with [place].  5. Gait  x 300 feet with Modified [place] with/without an assistive device.  6. Ascend/descend 1 flight of stairs with right Handrails modified independence.  7. Stand for 5 minutes with modified independence while performing a task (playing ladder ball, throwing beach ball). Met (10/5/2017)  8. Lower extremity exercise program x 20 standing exercises with unilateral UE support (hip flexion, hip abduction, heel raises).   9. Pt will be able to ambulate in all directions x 20 feet to display the ability to change direction of ambulation without loss of balance (supervision).                      PLAN:    Patient to be seen   (5-6x/wk)  to address the above listed problems via gait training, therapeutic activities, therapeutic exercises, neuromuscular re-education  Plan of Care expires: 11/03/17  Plan of Care reviewed with:      Amira Salazar, BUCK  10/11/2017

## 2017-10-11 NOTE — PLAN OF CARE
Problem: Occupational Therapy Goal  Goal: Occupational Therapy Goal  Goals to be met by: 14 days     Patient will increase functional independence with ADLs by performing:    UE Dressing with Modified [place]. GOAL MET 10/09/17  LE Dressing with Modified [place].  Grooming while standing with Modified [place].  Toileting from toilet with Modified [place] for hygiene and clothing management. GOAL MET 10/11/17  Bathing from  shower chair/bench with Modified [place].  Toilet transfer to toilet with Modified [place].  Pt will demonstrate good dynamic standing balance during functional task lasting 10 minutes GOAL MET 10/09/17        Outcome: Ongoing (interventions implemented as appropriate)    Pt was agreeable to OT and was noted to make progress towards his goals in therapy.  During today's session, pt met goal for toileting.   Pt's goals remain appropriate at this time.  He will continue to benefit from skilled OT services in order to assist him with increasing his safety and level of independence with self care and mobility tasks.     Key Taylor, OT  10/11/2017

## 2017-10-11 NOTE — PLAN OF CARE
Problem: Fall Risk (Adult)  Goal: Identify Related Risk Factors and Signs and Symptoms  Related risk factors and signs and symptoms are identified upon initiation of Human Response Clinical Practice Guideline (CPG)   Outcome: Ongoing (interventions implemented as appropriate)   10/11/17 0359   Fall Risk   Related Risk Factors (Fall Risk) fatigue/slow reaction;gait/mobility problems

## 2017-10-11 NOTE — PLAN OF CARE
10/11/2017  2:03 PM    SW met with pt in room to discuss d/c plans.  Pt aware of current SNF d/c date of 10/17/17.  Pt expressed understanding and satisfaction regarding d/c date.  Pt states plans of returning home upon SNF d/c where he lives alone in a 1st floor apartment with no BENITO.  Pt reports having a son, daughter, and friend who can assist whenever needed.  Pt reports son's and daughter's work schedule is flexible and both can visit pt at home whenever needed during the day.  Pt has a tub/shower combo and owns a WC, RW, and BSC.  Pt denies having preference for home health placement.  Pt denies having any questions or concerns regarding d/c plan at this time.  SW remains available for further d/c planning assistance and will f/u as needed.    Ricardo Holguin, ORESTES  [phone]

## 2017-10-11 NOTE — PLAN OF CARE
Problem: Patient Care Overview  Goal: Plan of Care Review  Outcome: Revised  Repositions independently, no new skin breakdowns noted. Afebrile. Monitored for pain and safety. Safety maintained. Denies pain.

## 2017-10-11 NOTE — PLAN OF CARE
Problem: Physical Therapy Goal  Goal: Physical Therapy Goal  Goals to be met by: 2 weeks     Patient will increase functional independence with mobility by performin. Supine to sit with [place]. met  2. Sit to supine with [place]. met  3. Sit to stand transfer with [place].  4. Bed to chair transfer with [place].  5. Gait  x 300 feet with Modified [place] with/without an assistive device.  6. Ascend/descend 1 flight of stairs with right Handrails modified independence.  7. Stand for 5 minutes with modified independence while performing a task (playing ladder ball, throwing beach ball). Met (10/5/2017)  8. Lower extremity exercise program x 20 standing exercises with unilateral UE support (hip flexion, hip abduction, heel raises).   9. Pt will be able to ambulate in all directions x 20 feet to display the ability to change direction of ambulation without loss of balance (supervision).     Outcome: Ongoing (interventions implemented as appropriate)  Goals remain appropriate

## 2017-10-12 LAB
ANION GAP SERPL CALC-SCNC: 6 MMOL/L
BASOPHILS # BLD AUTO: 0.05 K/UL
BASOPHILS NFR BLD: 0.9 %
BUN SERPL-MCNC: 10 MG/DL
CALCIUM SERPL-MCNC: 8.6 MG/DL
CHLORIDE SERPL-SCNC: 106 MMOL/L
CO2 SERPL-SCNC: 29 MMOL/L
CREAT SERPL-MCNC: 0.9 MG/DL
DIFFERENTIAL METHOD: ABNORMAL
EOSINOPHIL # BLD AUTO: 0.2 K/UL
EOSINOPHIL NFR BLD: 2.8 %
ERYTHROCYTE [DISTWIDTH] IN BLOOD BY AUTOMATED COUNT: 12.8 %
EST. GFR  (AFRICAN AMERICAN): >60 ML/MIN/1.73 M^2
EST. GFR  (NON AFRICAN AMERICAN): >60 ML/MIN/1.73 M^2
GLUCOSE SERPL-MCNC: 77 MG/DL
HCT VFR BLD AUTO: 37.8 %
HGB BLD-MCNC: 12 G/DL
LYMPHOCYTES # BLD AUTO: 2.2 K/UL
LYMPHOCYTES NFR BLD: 40.6 %
MAGNESIUM SERPL-MCNC: 1.9 MG/DL
MCH RBC QN AUTO: 28.7 PG
MCHC RBC AUTO-ENTMCNC: 31.7 G/DL
MCV RBC AUTO: 90 FL
MONOCYTES # BLD AUTO: 0.7 K/UL
MONOCYTES NFR BLD: 13.1 %
NEUTROPHILS # BLD AUTO: 2.3 K/UL
NEUTROPHILS NFR BLD: 42.6 %
PHOSPHATE SERPL-MCNC: 3.6 MG/DL
PLATELET # BLD AUTO: 214 K/UL
PMV BLD AUTO: 12.2 FL
POTASSIUM SERPL-SCNC: 3.4 MMOL/L
RBC # BLD AUTO: 4.18 M/UL
SODIUM SERPL-SCNC: 141 MMOL/L
WBC # BLD AUTO: 5.42 K/UL

## 2017-10-12 PROCEDURE — 25000003 PHARM REV CODE 250: Performed by: NURSE PRACTITIONER

## 2017-10-12 PROCEDURE — 12000000 HC SNF SEMI-PRIVATE ROOM

## 2017-10-12 PROCEDURE — 99309 SBSQ NF CARE MODERATE MDM 30: CPT | Mod: ,,, | Performed by: NURSE PRACTITIONER

## 2017-10-12 PROCEDURE — 36415 COLL VENOUS BLD VENIPUNCTURE: CPT

## 2017-10-12 PROCEDURE — 85025 COMPLETE CBC W/AUTO DIFF WBC: CPT

## 2017-10-12 PROCEDURE — 80048 BASIC METABOLIC PNL TOTAL CA: CPT

## 2017-10-12 PROCEDURE — 97535 SELF CARE MNGMENT TRAINING: CPT

## 2017-10-12 PROCEDURE — 97116 GAIT TRAINING THERAPY: CPT

## 2017-10-12 PROCEDURE — 97110 THERAPEUTIC EXERCISES: CPT

## 2017-10-12 PROCEDURE — 25000003 PHARM REV CODE 250: Performed by: STUDENT IN AN ORGANIZED HEALTH CARE EDUCATION/TRAINING PROGRAM

## 2017-10-12 PROCEDURE — 97530 THERAPEUTIC ACTIVITIES: CPT

## 2017-10-12 PROCEDURE — 83735 ASSAY OF MAGNESIUM: CPT

## 2017-10-12 PROCEDURE — 97112 NEUROMUSCULAR REEDUCATION: CPT

## 2017-10-12 PROCEDURE — 84100 ASSAY OF PHOSPHORUS: CPT

## 2017-10-12 RX ORDER — POTASSIUM CHLORIDE 20 MEQ/1
40 TABLET, EXTENDED RELEASE ORAL ONCE
Status: COMPLETED | OUTPATIENT
Start: 2017-10-12 | End: 2017-10-12

## 2017-10-12 RX ADMIN — LISINOPRIL 40 MG: 20 TABLET ORAL at 08:10

## 2017-10-12 RX ADMIN — POTASSIUM CHLORIDE 40 MEQ: 1500 TABLET, EXTENDED RELEASE ORAL at 12:10

## 2017-10-12 RX ADMIN — LEVETIRACETAM 500 MG: 500 TABLET, FILM COATED ORAL at 06:10

## 2017-10-12 RX ADMIN — ATORVASTATIN CALCIUM 40 MG: 20 TABLET, FILM COATED ORAL at 09:10

## 2017-10-12 RX ADMIN — AMLODIPINE BESYLATE 10 MG: 10 TABLET ORAL at 08:10

## 2017-10-12 RX ADMIN — LEVETIRACETAM 250 MG: 250 TABLET, FILM COATED ORAL at 09:10

## 2017-10-12 NOTE — ASSESSMENT & PLAN NOTE
-BP better controlled, patient with hypotension earlier in the week  -Continue amlodipine 10mg daily and lisinopril 40mg daily to treat.   -stopped hydralazine  -Continue to monitor and adjust as needed.

## 2017-10-12 NOTE — PLAN OF CARE
Problem: Occupational Therapy Goal  Goal: Occupational Therapy Goal  Goals to be met by: 14 days     Patient will increase functional independence with ADLs by performing:    UE Dressing with Modified [place]. GOAL MET 10/09/17  LE Dressing with Modified [place]. GOAL MET 10/12/17  Grooming while standing with Modified [place]. 10/12/17  Toileting from toilet with Modified [place] for hygiene and clothing management. GOAL MET 10/11/17  Bathing from  shower chair/bench with Modified [place].  Toilet transfer to toilet with Modified [place]. GOAL MET 10/12/17  Pt will demonstrate good dynamic standing balance during functional task lasting 10 minutes GOAL MET 10/09/17         Outcome: Ongoing (interventions implemented as appropriate)    Pt was agreeable to OT and was noted to make progress towards his goals in therapy.  During today's session, pt met 3 goals for LE dressing, grooming, and toilet transfer.   Pt's goals remain appropriate at this time.  He will continue to benefit from skilled OT services in order to assist him with increasing his safety and level of independence with self care and mobility tasks.     Key Taylor, OT  10/12/2017

## 2017-10-12 NOTE — PLAN OF CARE
Problem: Patient Care Overview  Goal: Plan of Care Review  Outcome: Revised  Repositions independently, no new skin breakdowns noted. No s/s of bleeding. Afebrile. Monitored for pain and safety. Safety maintained. Denies pain

## 2017-10-12 NOTE — PROGRESS NOTES
[facility] Department of Hospital Medicine  Progress Note    Patient Name: Otis Salcido  MRN: [id number]  Code Status: Full Code  Admission Date: 10/3/2017  Length of Stay: 9 days  Attending Physician: Kathy Mora MD  Primary Care Provider: Primary Doctor No    Subjective:     Principal Problem:Nontraumatic intracerebral hemorrhage of cerebellum    Chief Complaint/Reason for Admission: Debility    History of Present Illness:  Patient is a 67 y.o. male who has a past medical history of Glaucoma; High cholesterol; History of blood clots (on Xarelto); Hypertension; Kidney stone; and Prostate cancer s/p resection presented with headache and found to have left-sided nontraumatic intracerebral hemorrhage of cerebellum. Neurosurgery and vascular neurology were consulted and recommended non surgical management. Etiology of ICH likely hypertensive vs ischemic stroke with hemorrhagic transformation while on rivaroxaban. Patient has been working with PT/OT who recommend inpatient rehab for further balance/mobility training however insurance denied admission. Patient now being admitted to SNF for rehab. Patient lives in apartment alone and he was independent with ADLs prior to admission. Patient currently denies any fever/chills, chest pain, dyspnea, abdominal pain, nausea/vomiting.     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  10/5: Patient seen during therapy, therapist concern patient c/o headache and dizziness, upon further interviewed patient explained he has been having a headache every morning since going to main campus along with dizziness. Headache and dizziness isn't new and both have not worsen since onset. Discussed with patient s/s of worsening hemorrhage and instructed patient to inform staff if occurs. Verbalized understanding.     10/9: hypotensive adjusted  hydralazine   10/11: hypotensive stopped hydralazine  10/12: BP improved, patient doing well no complaints, labs reviewed--K+ replaced    Interval History: Patient seen today, no complaints. No acute events overnight.    Review of Systems   Constitutional: Negative for appetite change, chills, fatigue and fever.   HENT: Negative for trouble swallowing.    Respiratory: Negative for cough, chest tightness, shortness of breath and wheezing.    Cardiovascular: Negative for chest pain, palpitations and leg swelling.   Gastrointestinal: Negative for abdominal pain, constipation, diarrhea and nausea.   Genitourinary: Negative for difficulty urinating, frequency and urgency.   Musculoskeletal: Negative for arthralgias and myalgias.   Skin: Negative for rash.   Neurological: Positive for dizziness and headaches. Negative for weakness and light-headedness.        Reports dizziness and headache much improved   Psychiatric/Behavioral: Negative for sleep disturbance.     Scheduled Meds:   amlodipine  10 mg Oral Daily    atorvastatin  40 mg Oral QHS    levetiracetam  250 mg Oral Nightly    levetiracetam  500 mg Oral QAM    lisinopril  40 mg Oral Daily    senna-docusate 8.6-50 mg  1 tablet Oral Daily     Continuous Infusions:   PRN Meds:.acetaminophen, calcium carbonate, ramelteon  Objective:     Vital Signs (Most Recent):  Temp: 98 °F (36.7 °C) (10/12/17 0811)  Pulse: 65 (10/12/17 0811)  Resp: 18 (10/12/17 0811)  BP: (!) 144/75 (10/12/17 0811)  SpO2: 95 % (10/12/17 0811) Vital Signs (24h Range):  Temp:  [97.2 °F (36.2 °C)-98 °F (36.7 °C)] 98 °F (36.7 °C)  Pulse:  [65-67] 65  Resp:  [18-20] 18  SpO2:  [95 %] 95 %  BP: (138-144)/(63-75) 144/75     Weight: 74.3 kg (163 lb 12.8 oz)  Body mass index is 25.65 kg/m².  No intake or output data in the 24 hours ending 10/12/17 1253   Physical Exam   Constitutional: He is oriented to person, place, and time. He appears well-developed and well-nourished. No distress.   Cardiovascular:  Normal rate, regular rhythm and normal heart sounds.  Exam reveals no gallop and no friction rub.    No murmur heard.  Pulmonary/Chest: Effort normal and breath sounds normal. No respiratory distress. He has no wheezes. He has no rales.   Abdominal: Soft. Bowel sounds are normal. He exhibits no distension. There is no tenderness.   Musculoskeletal: Normal range of motion. He exhibits no edema or tenderness.   Neurological: He is alert and oriented to person, place, and time.   Skin: Skin is warm and dry. No rash noted. He is not diaphoretic. No cyanosis. Nails show no clubbing.   Psychiatric: He has a normal mood and affect. His behavior is normal.     Significant Labs:    Recent Labs  Lab 10/09/17  0458 10/12/17  0437   WBC 4.38 5.42   HGB 12.4* 12.0*   HCT 39.5* 37.8*    214       Recent Labs  Lab 10/09/17  0458 10/12/17  0437    141   K 3.5 3.4*    106   CO2 27 29   BUN 8 10   CREATININE 0.8 0.9   CALCIUM 9.1 8.6*     Lab Results   Component Value Date    LABPROT 10.6 09/28/2017    ALBUMIN 3.1 (L) 10/03/2017     Lab Results   Component Value Date    CALCIUM 8.6 (L) 10/12/2017    PHOS 3.6 10/12/2017     Significant Imaging: n/a    Assessment/Plan:      * Nontraumatic intracerebral hemorrhage of cerebellum    -Neurologically stable  -Continue neuro checks  -Continue to hold anticoagulation  -Continue Keppra for seizure prophylaxis   -Continue with PT/OT for gait training and strengthening and restoration of ADL's   -Continue DVT prophylaxis with TEDs/SCD  -Follow up in neurosurgery clinic in 8 weeks with MRI brain with Dr. Hall  -fall precautions   -delirium precautions   -continue tylenol for headaches  -continue bowel regimen with senokot-s         Debility    -Continue PT/OT as above.         History of deep venous thrombosis (DVT) of distal vein of left lower extremity 11/2016    -Plan as above.         History of blood clots    -Holding anticoagulation for now until cleared by neurosurgery.    -continue TEDs and SCDs        High cholesterol    -Continue home atorvastatin 40mg daily to treat.         Essential hypertension    -BP better controlled, patient with hypotension earlier in the week  -Continue amlodipine 10mg daily and lisinopril 40mg daily to treat.   -stopped hydralazine  -Continue to monitor and adjust as needed.         Prostate cancer s/p treatment, currently under PSA surveillance    -No acute issues  -Follow up outpatient with urology          Future Appointments  Date Time Provider Department Center   11/6/2017 12:45 PM [facility] MRI4 [facility] MRI Luisito Romano   11/6/2017 2:00 PM Paul Arriaga MD [facility] NEUROS7 Luisito French NP  Department of [facility] Medicine  [facility]

## 2017-10-12 NOTE — PT/OT/SLP PROGRESS
"Occupational Therapy  Treatment    Otis Salcido   MRN: [id number]   Admitting Diagnosis: Nontraumatic intracerebral hemorrhage of cerebellum    OT Date of Treatment: 10/12/17  Total Time (min): 43 min    Billable Minutes:  Self Care/Home Management 12 and Neuromuscular Re-education 31    General Precautions: Standard, aspiration, fall  Orthopedic Precautions: N/A  Braces: N/A         Subjective:  Communicated with nurse prior to session.  "I'm going home next Tuesday."    Pain/Comfort  Pain Rating 1: 0/10  Pain Rating Post-Intervention 1: 0/10    Objective:  Patient found with:  (no lines - sitting in bedside chair)    Functional Status:  MDS G  Bed Mobility Functional Status: mod(I) - (I) - per patient report    Transfer Functional Status: S-SBA  Transfer Level of (A):  (S - mod I for all transfers with RW - toileting was mod I but at times becomes S due occasional LOB)    Walk in Room Functional Status: S-SBA  Walk In Room Level of (A):  (S using RW)    Walk in Corridor Functional Status: S-SBA  Walk In Corridor Level of (A):  (S using RW to walk from room <-> gym)    Dressing Functional Status: 0: mod(I) - (I)  Dressing Level of (A) :  (MOd I for UE and LE dressing)    Toilet Use Functional Status: mod(I) - (I)  Toilet Use Level of (A) :  (MOd I )    Personal Hygiene Functional Status: mod(I) - (I)  Personal Hygiene Level of (A) :  (MOd I to wash hands)    Moving from seated to standing position: Steady at all times  Walking (with assistive device if used): Not steady, but able to stabilize without staff assistance  Turning around and facing the opposite direction while walking: Not steady, but able to stabilize without staff assistance  Moving on and off the toilet: Steady at all times  Surface-to-surface transfer (transfer between bed and chair or wheelchair): Not steady, but able to stabilize without staff assistance        Additional Treatment:  · Pt worked on NMR techniques on L UE to improve motor control " and function due to decreased motor coordination in this extremity and it's carry over effects on ADLs and func mobility. Pt worked on the following activities to promote function with introduction of proprioceptive input, along with repetative movement activity. Pt worked on the following:  · UBE with high resistance for 15 minutes - pt performed 50% of time with just L UE - used B UEs when L arm fatigued intermittently  · Pt performed 3 dynamic standing activities for 15 minutes time span with emphasis on standing balance, L UE gross motor function, crossing midline, weight shift, B UE integration and strengthening.  Pt wore 3# wrist weight while throwing bean bags into container with overhand and underhand throws using L UE - pt leaned against counter for first time through activity and stepped away from counter with R hand occasionally on counter top for support.  Pt able to maintain standing balance with 2 events of LOB which pt quickly self corrected.  Pt noted with greater oscillations when throwing overhand, but noted with gradual decline as activity progressed.  Pt also worked on task of bouncing ball back and forth with therapist - while maintaining L wrist weight in place, pt would  therapy ball over head and bounce to therapist - therapist bounced ball back to pt who caught it with B UEs.  Pt able to demonstrate good dynamic standing balance during task and noted with minimal UE oscillations during this activity. Lastly, pt worked on dribbling therapy ball - pt noted with moderate difficulty switching from R to L hand during task.   · Pt noted with overall decreased L UE oscillations which were present primarily in the beginning of tasks and diminished during activities.  Pt reports doing exercises throughout the day to work on improving his UE control and is noticing improvements with his ADLs with improved control and balance.    Patient left up in chair with call button in  reach    ASSESSMENT:  Otis Salcido is a 67 y.o. male with a medical diagnosis of Nontraumatic intracerebral hemorrhage of cerebellum.  Pt was agreeable to OT and was noted to make progress towards his goals in therapy.  During today's session, pt met 3 goals for LE dressing, grooming, and toilet transfer.   Pt's goals remain appropriate at this time.  Pt continues to demonstrate ataxic/apraxic like movements in L UE when initiating activity but noted with significant improvement today as pt able to recover and control L UE movements more rapidly.  Pt also demonstrated improved dynamic standing balance and ADLs.  He will continue to benefit from skilled OT services in order to assist him with increasing his safety and level of independence with self care and mobility tasks.         Rehab identified problem list/impairments: weakness, impaired endurance, impaired sensation, impaired self care skills, impaired functional mobilty, gait instability, impaired balance, decreased coordination, decreased upper extremity function, decreased lower extremity function, decreased safety awareness, impaired coordination, impaired fine motor    Rehab potential is good    Activity tolerance: Good    Discharge recommendations: home health OT     Barriers to discharge: Decreased caregiver support     Equipment recommendations: bedside commode, bath bench     GOALS:    Occupational Therapy Goals        Problem: Occupational Therapy Goal    Goal Priority Disciplines Outcome Interventions   Occupational Therapy Goal     OT, PT/OT Ongoing (interventions implemented as appropriate)    Description:  Goals to be met by: 14 days     Patient will increase functional independence with ADLs by performing:    UE Dressing with Modified [place]. GOAL MET 10/09/17  LE Dressing with Modified [place]. GOAL MET 10/12/17  Grooming while standing with Modified [place]. 10/12/17  Toileting from toilet with Modified [place] for  hygiene and clothing management. GOAL MET 10/11/17  Bathing from  shower chair/bench with Modified [place].  Toilet transfer to toilet with Modified [place]. GOAL MET 10/12/17  Pt will demonstrate good dynamic standing balance during functional task lasting 10 minutes GOAL MET 10/09/17                           Plan:  Patient to be seen  (5-6x/week) to address the above listed problems via self-care/home management, therapeutic activities, therapeutic exercises, neuromuscular re-education  Plan of Care expires: 11/03/17  Plan of Care reviewed with: patient    Key POWER Brandon, OT  10/12/2017

## 2017-10-12 NOTE — SUBJECTIVE & OBJECTIVE
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  10/5: Patient seen during therapy, therapist concern patient c/o headache and dizziness, upon further interviewed patient explained he has been having a headache every morning since going to main campus along with dizziness. Headache and dizziness isn't new and both have not worsen since onset. Discussed with patient s/s of worsening hemorrhage and instructed patient to inform staff if occurs. Verbalized understanding.     10/9: hypotensive adjusted hydralazine   10/11: hypotensive stopped hydralazine  10/12: BP improved, patient doing well no complaints, labs reviewed--K+ replaced    Interval History: Patient seen today, no complaints. No acute events overnight.    Review of Systems   Constitutional: Negative for appetite change, chills, fatigue and fever.   HENT: Negative for trouble swallowing.    Respiratory: Negative for cough, chest tightness, shortness of breath and wheezing.    Cardiovascular: Negative for chest pain, palpitations and leg swelling.   Gastrointestinal: Negative for abdominal pain, constipation, diarrhea and nausea.   Genitourinary: Negative for difficulty urinating, frequency and urgency.   Musculoskeletal: Negative for arthralgias and myalgias.   Skin: Negative for rash.   Neurological: Positive for dizziness and headaches. Negative for weakness and light-headedness.        Reports dizziness and headache much improved   Psychiatric/Behavioral: Negative for sleep disturbance.     Scheduled Meds:   amlodipine  10 mg Oral Daily    atorvastatin  40 mg Oral QHS    levetiracetam  250 mg Oral Nightly    levetiracetam  500 mg Oral QAM    lisinopril  40 mg Oral Daily    senna-docusate 8.6-50 mg  1 tablet Oral Daily     Continuous Infusions:   PRN Meds:.acetaminophen, calcium carbonate, ramelteon  Objective:     Vital Signs (Most  Recent):  Temp: 98 °F (36.7 °C) (10/12/17 0811)  Pulse: 65 (10/12/17 0811)  Resp: 18 (10/12/17 0811)  BP: (!) 144/75 (10/12/17 0811)  SpO2: 95 % (10/12/17 0811) Vital Signs (24h Range):  Temp:  [97.2 °F (36.2 °C)-98 °F (36.7 °C)] 98 °F (36.7 °C)  Pulse:  [65-67] 65  Resp:  [18-20] 18  SpO2:  [95 %] 95 %  BP: (138-144)/(63-75) 144/75     Weight: 74.3 kg (163 lb 12.8 oz)  Body mass index is 25.65 kg/m².  No intake or output data in the 24 hours ending 10/12/17 1253   Physical Exam   Constitutional: He is oriented to person, place, and time. He appears well-developed and well-nourished. No distress.   Cardiovascular: Normal rate, regular rhythm and normal heart sounds.  Exam reveals no gallop and no friction rub.    No murmur heard.  Pulmonary/Chest: Effort normal and breath sounds normal. No respiratory distress. He has no wheezes. He has no rales.   Abdominal: Soft. Bowel sounds are normal. He exhibits no distension. There is no tenderness.   Musculoskeletal: Normal range of motion. He exhibits no edema or tenderness.   Neurological: He is alert and oriented to person, place, and time.   Skin: Skin is warm and dry. No rash noted. He is not diaphoretic. No cyanosis. Nails show no clubbing.   Psychiatric: He has a normal mood and affect. His behavior is normal.     Significant Labs:    Recent Labs  Lab 10/09/17  0458 10/12/17  0437   WBC 4.38 5.42   HGB 12.4* 12.0*   HCT 39.5* 37.8*    214       Recent Labs  Lab 10/09/17  0458 10/12/17  0437    141   K 3.5 3.4*    106   CO2 27 29   BUN 8 10   CREATININE 0.8 0.9   CALCIUM 9.1 8.6*     Lab Results   Component Value Date    LABPROT 10.6 09/28/2017    ALBUMIN 3.1 (L) 10/03/2017     Lab Results   Component Value Date    CALCIUM 8.6 (L) 10/12/2017    PHOS 3.6 10/12/2017     Significant Imaging: n/a

## 2017-10-12 NOTE — PLAN OF CARE
Problem: Fall Risk (Adult)  Goal: Absence of Falls  Patient will demonstrate the desired outcomes by discharge/transition of care.   Outcome: Ongoing (interventions implemented as appropriate)   10/11/17 2000   Fall Risk (Adult)   Absence of Falls making progress toward outcome   Pt remian free from falls/injury/trauma. Call light w/i reach. Will monitro

## 2017-10-12 NOTE — PT/OT/SLP PROGRESS
"Physical Therapy  Treatment    Otis Salcido   MRN: [id number]   Admitting Diagnosis: Nontraumatic intracerebral hemorrhage of cerebellum    PT Received On: 10/12/17  Total Time (min): 45       Billable Minutes:45    Gait [id number], Therapeutic Exercise 15 and Neuromuscular Re-education 15    Treatment Type: Treatment  PT/PTA: PTA     PTA Visit Number: 2       General Precautions: Standard, aspiration, fall  Orthopedic Precautions: N/A   Braces: N/A    Do you have any cultural, spiritual, Baptism conflicts, given your current situation?: no    Subjective  "feel alright" occ noted pt closing his eyes during session, when asked if feeling dizzy pt states no    Pain/Comfort  Pain Rating 1: 0/10  Pain Rating Post-Intervention 1: 0/10    Objective:  Patient found with:  (sitting EOB)       Functional Status:  MDS G  Transfer Functional Status: S-SBA  Walk in Corridor Functional Status: S-SBA        Transfers:  Sit<>Stand: S with RW improvement noted with inc awareness/control/safety with descent  Stand Pivot Transfer: close S EOB>WC no AD      Gait:  Amb with  ft close S, 150 ft  with no AD close SBA no LOB, some what unsteady ,minimal arm swing      Advanced Gait:  Stairs: asc/yobany 4 6" steps x 3 trials (12 steps-flight) with RHR close S    Therex:  Standing therex RUE support CG/SBA x 15 reps heel raises,MIP,mini squats,abd/add    Balance:  Dynamic standing activities CG/close SBA   Tapping balloon with BUE uni lat support RW  Catch/toss basket ball under/over hand / bounce back/forth  Dribble CGA ~ 20-25 with RUE    Patient left up in chair with call button in reach and belongbelonging in reach.    Assessment:  Otis Salcido is a 67 y.o. male with a medical diagnosis of Nontraumatic intracerebral hemorrhage of cerebellum.  Pt tolerated well, pt would continue to benefit from skilled PT services to improve overall functional mobility, strength and endurance.  .    Rehab identified problem list/impairments: " weakness, impaired endurance, impaired self care skills, impaired functional mobilty, gait instability, decreased coordination, decreased upper extremity function, decreased lower extremity function, decreased safety awareness, impaired fine motor    Rehab potential is good.    Activity tolerance: Fair    Discharge recommendations: home health PT light A/S for safety, pt reports he will have A    Barriers to discharge: Decreased caregiver support (lives alone)    Equipment recommendations: none     GOALS:    Physical Therapy Goals        Problem: Physical Therapy Goal    Goal Priority Disciplines Outcome Goal Variances Interventions   Physical Therapy Goal     PT/OT, PT Ongoing (interventions implemented as appropriate)     Description:  Goals to be met by: 2 weeks     Patient will increase functional independence with mobility by performin. Supine to sit with [place]. met  2. Sit to supine with [place]. met  3. Sit to stand transfer with [place].  4. Bed to chair transfer with [place].  5. Gait  x 300 feet with Modified [place] with/without an assistive device.  6. Ascend/descend 1 flight of stairs with right Handrails modified independence.  7. Stand for 5 minutes with modified independence while performing a task (playing ladder ball, throwing beach ball). Met (10/5/2017)  8. Lower extremity exercise program x 20 standing exercises with unilateral UE support (hip flexion, hip abduction, heel raises). met  9. Pt will be able to ambulate in all directions x 20 feet to display the ability to change direction of ambulation without loss of balance (supervision).                       PLAN:    Patient to be seen  (5-6x/wk)  to address the above listed problems via gait training, therapeutic activities, therapeutic exercises, neuromuscular re-education  Plan of Care expires: 17  Plan of Care reviewed with:      Amira Salazar PTA  10/12/2017

## 2017-10-13 PROCEDURE — 97110 THERAPEUTIC EXERCISES: CPT

## 2017-10-13 PROCEDURE — 97112 NEUROMUSCULAR REEDUCATION: CPT

## 2017-10-13 PROCEDURE — 25000003 PHARM REV CODE 250: Performed by: STUDENT IN AN ORGANIZED HEALTH CARE EDUCATION/TRAINING PROGRAM

## 2017-10-13 PROCEDURE — 97116 GAIT TRAINING THERAPY: CPT

## 2017-10-13 PROCEDURE — 97530 THERAPEUTIC ACTIVITIES: CPT

## 2017-10-13 PROCEDURE — 25000003 PHARM REV CODE 250: Performed by: NURSE PRACTITIONER

## 2017-10-13 PROCEDURE — 12000000 HC SNF SEMI-PRIVATE ROOM

## 2017-10-13 RX ADMIN — LEVETIRACETAM 500 MG: 500 TABLET, FILM COATED ORAL at 06:10

## 2017-10-13 RX ADMIN — LISINOPRIL 40 MG: 20 TABLET ORAL at 11:10

## 2017-10-13 RX ADMIN — LEVETIRACETAM 250 MG: 250 TABLET, FILM COATED ORAL at 09:10

## 2017-10-13 RX ADMIN — AMLODIPINE BESYLATE 10 MG: 10 TABLET ORAL at 11:10

## 2017-10-13 RX ADMIN — ATORVASTATIN CALCIUM 40 MG: 20 TABLET, FILM COATED ORAL at 09:10

## 2017-10-13 NOTE — PT/OT/SLP PROGRESS
Occupational Therapy  Treatment    Otis Salcido   MRN: [id number]   Admitting Diagnosis: Nontraumatic intracerebral hemorrhage of cerebellum    OT Date of Treatment: 10/13/17  Total Time (min): 48 min    Billable Minutes:  Therapeutic Activity 18 and Therapeutic Exercise 30    General Precautions: Standard, aspiration, fall  Orthopedic Precautions: N/A  Braces: N/A         Subjective:  Communicated with pt prior to session.      Pain/Comfort  Pain Rating 1: 0/10  Pain Rating Post-Intervention 1: 0/10    Objective:  Patient found with:  (seated in bedside chair)    Functional Status:  MDS G  Transfer Functional Status: S-SBA from bedside chair to and from w/c no AD and to stand from w/c at table          OT Exercises: UE Ergometer 15 min to increase functional endurance for ADLS  Lat pull downs 40# 15 x 4 to increase UE strength for mobility  Rowing 30# 15 x 4 to increase UE strength for mobility    Additional Treatment:  Pt performed a functional dynamic standing task x 10 min with sup to increase indep with standing grooming and kitchen tasks  Pt followed a pattern and performed indep    Patient left up in chair with call button in reach    ASSESSMENT:  Pt tolerated tx and demonstrated increased indep with functional mobiltiy and standing tasks. Pt with good safety awareness and cont to benefit from OT to increase functional indep and safety    Rehab identified problem list/impairments: weakness, impaired endurance, impaired sensation, impaired self care skills, impaired functional mobilty, gait instability, impaired balance, decreased coordination, decreased upper extremity function, decreased lower extremity function, decreased safety awareness, impaired coordination, impaired fine motor    Rehab potential is good    Activity tolerance: Good    Discharge recommendations: home health OT     Barriers to discharge: Decreased caregiver support     Equipment recommendations: bedside commode, bath bench     GOALS:     Occupational Therapy Goals        Problem: Occupational Therapy Goal    Goal Priority Disciplines Outcome Interventions   Occupational Therapy Goal     OT, PT/OT Ongoing (interventions implemented as appropriate)    Description:  Goals to be met by: 14 days     Patient will increase functional independence with ADLs by performing:    UE Dressing with Modified [place]. GOAL MET 10/09/17  LE Dressing with Modified [place]. GOAL MET 10/12/17  Grooming while standing with Modified [place]. 10/12/17  Toileting from toilet with Modified [place] for hygiene and clothing management. GOAL MET 10/11/17  Bathing from  shower chair/bench with Modified [place].  Toilet transfer to toilet with Modified [place]. GOAL MET 10/12/17  Pt will demonstrate good dynamic standing balance during functional task lasting 10 minutes GOAL MET 10/09/17                           Plan:  Patient to be seen  (5-6x/week) to address the above listed problems via self-care/home management, therapeutic activities, therapeutic exercises, neuromuscular re-education  Plan of Care expires: 11/03/17  Plan of Care reviewed with: patient    JASMINA Rasmussen  10/13/2017

## 2017-10-13 NOTE — PLAN OF CARE
Problem: Physical Therapy Goal  Goal: Physical Therapy Goal  Goals to be met by: 2 weeks     Patient will increase functional independence with mobility by performin. Supine to sit with [place]. met  2. Sit to supine with [place]. met  3. Sit to stand transfer with [place].  4. Bed to chair transfer with [place].  5. Gait  x 300 feet with Modified [place] with/without an assistive device.  6. Ascend/descend 1 flight of stairs with right Handrails modified independence.  7. Stand for 5 minutes with modified independence while performing a task (playing ladder ball, throwing beach ball). Met (10/5/2017)  8. Lower extremity exercise program x 20 standing exercises with unilateral UE support (hip flexion, hip abduction, heel raises). met  9. Pt will be able to ambulate in all directions x 20 feet to display the ability to change direction of ambulation without loss of balance (supervision).      Outcome: Ongoing (interventions implemented as appropriate)  Goals remain appropriate

## 2017-10-13 NOTE — PLAN OF CARE
Problem: Patient Care Overview  Goal: Plan of Care Review   10/13/17 0309   Coping/Psychosocial   Plan Of Care Reviewed With patient       Problem: Fall Risk (Adult)  Intervention: Reduce Risk/Promote Restraint Free Environment   10/13/17 0309   Safety Interventions   Environmental Safety Modification assistive device/personal items within reach;lighting adjusted;mobility aid in reach;room organization consistent     Intervention: Patient Rounds   10/13/17 0309   Safety Interventions   Patient Rounds bed in low position;bed wheels locked;call light in reach;ID band on;clutter free environment maintained;placement of personal items at bedside;toileting offered;visualized patient     Intervention: Safety Promotion/Fall Prevention   10/13/17 0309   Safety Interventions   Safety Promotion/Fall Prevention assistive device/personal item within reach;commode/urinal/bedpan at bedside;Fall Risk reviewed with patient/family;Fall Risk signage in place;in recliner, wheels locked;nonskid shoes/socks when out of bed;upper side rails raised x 2, lower siderails raised x 1 (Peds only)       Goal: Identify Related Risk Factors and Signs and Symptoms  Related risk factors and signs and symptoms are identified upon initiation of [employer] Clinical Practice Guideline (CPG)   Outcome: Ongoing (interventions implemented as appropriate)   10/13/17 0309   Fall Risk   Related Risk Factors (Fall Risk) neuro disease/injury   Signs and Symptoms (Fall Risk) presence of risk factors

## 2017-10-13 NOTE — PLAN OF CARE
10/13/2017  11:02 AM     10/13/17 1102   Medicare Message   Important Message from Medicare regarding Discharge Appeal Rights Given to patient/caregiver;Explained to patient/caregiver;Signed/date by patient/caregiver   [facility] informed pt that SNF team recommended extended SNF stay with potential d/c date of 10/24/17.  Pt refused extended SNF stay stating that he is prepared for d/c home on 10/17/17 as previously scheduled.  Pt reports having adequate family assistance.  SW served NOMNC to patient notifying of discharge date of 10/17/17 and appeal right.  Patient expressed understanding and satisfaction regarding discharge date.  Patient signed NOMNC, and SW provided patient with copy.  SW faxed copy of signed NOMNC to [employer] (fx [phone]) and placed original in patient's blue chart in nurse's station.    Ricardo Holguin, JAIRO  [phone]

## 2017-10-13 NOTE — PT/OT/SLP PROGRESS
"Physical Therapy  Treatment    Otis Salcido   MRN: [id number]   Admitting Diagnosis: Nontraumatic intracerebral hemorrhage of cerebellum    PT Received On: 10/13/17  Total Time (min): 57       Billable Minutes:57  Gait [id number], Neuro Activity 17 and Therapeutic Exercise 15    Treatment Type: Treatment  PT/PTA: PTA     PTA Visit Number: 3       General Precautions: Standard, aspiration, fall  Orthopedic Precautions: N/A   Braces: N/A    Do you have any cultural, spiritual, Restorationism conflicts, given your current situation?: no    Subjective:  "feeling great"      Pain/Comfort  Pain Rating 1: 0/10  Pain Rating Post-Intervention 1: 0/10    Objective:  Patient found with:  (in wc)       Functional Status:  MDS G  Transfer Functional Status: S-SBA  Walk in Corridor Functional Status: CGA-Min (A)         Transfers:  Sit<>Stand: S with RW/SPC  Stand Pivot Transfer: S WC<>EOM no AD      Gait:  Amb with RW S ~285 ft no LOB, 150 ft no AD close SBA LOB x 1 min A and pt grabbed jones rail to recover balance, 150 ft with SPC CGA vcs for sequencing/coordination, 150 ft with  ft with CG/close SBA, improved seq/coordination on second trial    Additional Treatment:  BUE push ups 2x10 reps sitting EOM  CGA to get pt into quadruped on mat  CGA/cues for balance/posture, Wt shifting fwd/bwd/onto BUE, x5 reps of uni lat BUE lifts, x 5 reps of BLE kicks with 1 rest break,    Patient left up in chair with call button in reach and belongings in reach.    Assessment:  Otis Salcido is a 67 y.o. male with a medical diagnosis of Nontraumatic intracerebral hemorrhage of cerebellum.  Pt tolerated well, pt would continue to benefit from skilled PT services to improve overall functional mobility, strength and endurance.  .    Rehab identified problem list/impairments: weakness, impaired endurance, impaired self care skills, impaired functional mobilty, gait instability, decreased coordination, decreased upper extremity function, " decreased lower extremity function, decreased safety awareness, impaired fine motor    Rehab potential is good.    Activity tolerance: Fair    Discharge recommendations: home health PT     Barriers to discharge: Decreased caregiver support (lives alone)    Equipment recommendations: none     GOALS:    Physical Therapy Goals        Problem: Physical Therapy Goal    Goal Priority Disciplines Outcome Goal Variances Interventions   Physical Therapy Goal     PT/OT, PT Ongoing (interventions implemented as appropriate)     Description:  Goals to be met by: 2 weeks     Patient will increase functional independence with mobility by performin. Supine to sit with [place]. met  2. Sit to supine with [place]. met  3. Sit to stand transfer with [place].  4. Bed to chair transfer with [place].  5. Gait  x 300 feet with Modified [place] with/without an assistive device.  6. Ascend/descend 1 flight of stairs with right Handrails modified independence.  7. Stand for 5 minutes with modified independence while performing a task (playing ladder ball, throwing beach ball). Met (10/5/2017)  8. Lower extremity exercise program x 20 standing exercises with unilateral UE support (hip flexion, hip abduction, heel raises). met  9. Pt will be able to ambulate in all directions x 20 feet to display the ability to change direction of ambulation without loss of balance (supervision).                       PLAN:    Patient to be seen  (5-6x/wk)  to address the above listed problems via gait training, therapeutic activities, therapeutic exercises, neuromuscular re-education  Plan of Care expires: 17  Plan of Care reviewed with:      Amira Salazar PTA  10/13/2017

## 2017-10-13 NOTE — PLAN OF CARE
Problem: Occupational Therapy Goal  Goal: Occupational Therapy Goal  Goals to be met by: 14 days     Patient will increase functional independence with ADLs by performing:    UE Dressing with Modified [place]. GOAL MET 10/09/17  LE Dressing with Modified [place]. GOAL MET 10/12/17  Grooming while standing with Modified [place]. 10/12/17  Toileting from toilet with Modified [place] for hygiene and clothing management. GOAL MET 10/11/17  Bathing from  shower chair/bench with Modified [place].  Toilet transfer to toilet with Modified [place]. GOAL MET 10/12/17  Pt will demonstrate good dynamic standing balance during functional task lasting 10 minutes GOAL MET 10/09/17          Outcome: Ongoing (interventions implemented as appropriate)  Progressing. JASMINA Rasmussen  10/13/2017

## 2017-10-13 NOTE — PT/OT/SLP DISCHARGE
Physical Therapy Discharge Summary    Otis Salcido  MRN: [id number]   Left-sided nontraumatic intracerebral hemorrhage of cerebellum     Patient Discharged from acute Physical Therapy on 10/3/2017.  Please refer to prior PT noted date on 10/3/2017 for functional status.     Assessment:   Patient appropriate for care in another setting.  GOALS:    Physical Therapy Goals     Not on file          Multidisciplinary Problems (Resolved)        Problem: Physical Therapy Goal    Goal Priority Disciplines Outcome Goal Variances Interventions   Physical Therapy Goal   (Resolved)     PT/OT, PT Outcome(s) achieved     Description:  PT goals until 10/6/17     1. Pt supine to sit with CGA-not met  2. Pt sit to supine with CGA-not met  3. Pt sit to stand with or without RW as needed for safety with CGA-met      Pt sit to stand with stand by assist and no AD- not met  4. Pt to perform gait 100ft with or without RW as needed for safety with CGA.-partially met      Pt to perform gait x 200 feet stand by assist with no AD and no significant path deviation  5. Pt to transfer bed to/from bedside chair with CGA.-met  6. Pt to perform B LE exs in sitting x 20 reps with handout.-not met                     Reasons for Discontinuation of Therapy Services  Transfer to alternate level of care.      Plan:  Patient Discharged to: Skilled Nursing Facility.    Monica Echeverria, PT  10/13/2017  [phone] (pager)

## 2017-10-14 PROCEDURE — 25000003 PHARM REV CODE 250: Performed by: NURSE PRACTITIONER

## 2017-10-14 PROCEDURE — 25000003 PHARM REV CODE 250: Performed by: STUDENT IN AN ORGANIZED HEALTH CARE EDUCATION/TRAINING PROGRAM

## 2017-10-14 PROCEDURE — 12000000 HC SNF SEMI-PRIVATE ROOM

## 2017-10-14 RX ADMIN — LEVETIRACETAM 500 MG: 500 TABLET, FILM COATED ORAL at 06:10

## 2017-10-14 RX ADMIN — STANDARDIZED SENNA CONCENTRATE AND DOCUSATE SODIUM 1 TABLET: 8.6; 5 TABLET, FILM COATED ORAL at 08:10

## 2017-10-14 RX ADMIN — LEVETIRACETAM 250 MG: 250 TABLET, FILM COATED ORAL at 09:10

## 2017-10-14 RX ADMIN — AMLODIPINE BESYLATE 10 MG: 10 TABLET ORAL at 08:10

## 2017-10-14 RX ADMIN — ATORVASTATIN CALCIUM 40 MG: 20 TABLET, FILM COATED ORAL at 09:10

## 2017-10-14 RX ADMIN — LISINOPRIL 40 MG: 20 TABLET ORAL at 08:10

## 2017-10-15 PROCEDURE — 12000000 HC SNF SEMI-PRIVATE ROOM

## 2017-10-15 PROCEDURE — 97116 GAIT TRAINING THERAPY: CPT

## 2017-10-15 PROCEDURE — 97110 THERAPEUTIC EXERCISES: CPT

## 2017-10-15 PROCEDURE — 97112 NEUROMUSCULAR REEDUCATION: CPT

## 2017-10-15 PROCEDURE — 97535 SELF CARE MNGMENT TRAINING: CPT

## 2017-10-15 PROCEDURE — 97530 THERAPEUTIC ACTIVITIES: CPT

## 2017-10-15 PROCEDURE — 25000003 PHARM REV CODE 250: Performed by: STUDENT IN AN ORGANIZED HEALTH CARE EDUCATION/TRAINING PROGRAM

## 2017-10-15 RX ADMIN — LEVETIRACETAM 500 MG: 500 TABLET, FILM COATED ORAL at 06:10

## 2017-10-15 RX ADMIN — LEVETIRACETAM 250 MG: 250 TABLET, FILM COATED ORAL at 09:10

## 2017-10-15 RX ADMIN — ATORVASTATIN CALCIUM 40 MG: 20 TABLET, FILM COATED ORAL at 09:10

## 2017-10-15 NOTE — PT/OT/SLP PROGRESS
Physical Therapy  Treatment    Otis Salcido   MRN: [id number]   Admitting Diagnosis: Nontraumatic intracerebral hemorrhage of cerebellum    PT Received On: 10/15/17  Total Time (min): 40       Billable Minutes:  Gait [id number], Therapeutic Activity 10, Therapeutic Exercise 15 and Total Time 40    Treatment Type: Treatment  PT/PTA: PT     PTA Visit Number: 0       General Precautions: Standard, aspiration, fall  Orthopedic Precautions: N/A   Braces: N/A    Do you have any cultural, spiritual, Latter day conflicts, given your current situation?: no    Subjective:  Pt agreeable to session.    Pain/Comfort  Pain Rating 1: 4/10  Location - Side 1: Bilateral  Location - Orientation 1: lower  Location 1: back  Pain Addressed 1: Pre-medicate for activity  Pain Rating Post-Intervention 1: 4/10    Objective:  Patient found sitting in bedside chair       Functional Status:  MDS G  Transfer Functional Status: S-SBA  Walk in Room Functional Status: S-SBA  Walk in Corridor Functional Status: S-SBA  Locomotion on Unit Functional Status: S-SBA    Bed Mobility:  Not performed    Transfers:  Sit<>Stand: to/from w/c (4 trials) w/ and w/o RW, SPV for safety  Stand Pivot Transfer: bedside chair<>w/c w/o AD w/ SPV for safety    Gait:  Amb 2 trials (300ft w/ RW and SPV for safety, 150ft w/o AD w/ CGA for safety), pt unsteady w/o AD but was w/o major LOB     Balance:  Side stepping 14ft left/right (x3 consecutive trials) w/ BUE support on jones rail and SBA for safety  Standing beach ball tap x5min w/o AD w/ SBA/SPV for safety, no major LOB but pt unsteady    Additional Treatment:  Seated LBE x15min to inc BLE strength, endurance, and coordination    Patient left up in chair with call button in reach.    Assessment:  Otis Salcido is a 67 y.o. male with a medical diagnosis of Nontraumatic intracerebral hemorrhage of cerebellum.  Pt jeri session well w/ good participation. He remains most limited by impaired balance and coordination. He is  progressing well and will continue PT POC.    Rehab identified problem list/impairments: weakness, impaired endurance, impaired self care skills, impaired functional mobilty, gait instability, decreased coordination, decreased upper extremity function, decreased lower extremity function, decreased safety awareness, impaired fine motor    Rehab potential is good.    Activity tolerance: Good    Discharge recommendations: home health PT     Barriers to discharge: Decreased caregiver support (lives alone)    Equipment recommendations: none     GOALS:    Physical Therapy Goals        Problem: Physical Therapy Goal    Goal Priority Disciplines Outcome Goal Variances Interventions   Physical Therapy Goal     PT/OT, PT Ongoing (interventions implemented as appropriate)     Description:  Goals to be met by: 2 weeks     Patient will increase functional independence with mobility by performin. Supine to sit with [place]. met  2. Sit to supine with [place]. met  3. Sit to stand transfer with [place].  4. Bed to chair transfer with [place].  5. Gait  x 300 feet with Modified [place] with/without an assistive device.  6. Ascend/descend 1 flight of stairs with right Handrails modified independence.  7. Stand for 5 minutes with modified independence while performing a task (playing ladder ball, throwing beach ball). Met (10/5/2017)  8. Lower extremity exercise program x 20 standing exercises with unilateral UE support (hip flexion, hip abduction, heel raises). met  9. Pt will be able to ambulate in all directions x 20 feet to display the ability to change direction of ambulation without loss of balance (supervision).                       PLAN:    Patient to be seen  (5-6x/wk)  to address the above listed problems via gait training, therapeutic activities, therapeutic exercises, neuromuscular re-education  Plan of Care expires: 17  Plan of Care reviewed with:      Stephanie Fuentes,  PT  10/15/2017

## 2017-10-15 NOTE — PLAN OF CARE
Problem: Occupational Therapy Goal  Goal: Occupational Therapy Goal  Goals to be met by: 14 days     Patient will increase functional independence with ADLs by performing:    UE Dressing with Modified [place]. GOAL MET 10/09/17  LE Dressing with Modified [place]. GOAL MET 10/12/17  Grooming while standing with Modified [place]. 10/12/17  Toileting from toilet with Modified [place] for hygiene and clothing management. GOAL MET 10/11/17  Bathing from  shower chair/bench with Modified [place].  Toilet transfer to toilet with Modified [place]. GOAL MET 10/12/17  Pt will demonstrate good dynamic standing balance during functional task lasting 10 minutes GOAL MET 10/09/17          Outcome: Ongoing (interventions implemented as appropriate)    Pt was agreeable to OT and was noted to make progress towards his goals in therapy.  Pt's goals remain appropriate at this time.  He will continue to benefit from skilled OT services in order to assist him with increasing his safety and level of independence with self care and mobility tasks.     Key Taylor, OT  10/15/2017

## 2017-10-15 NOTE — PLAN OF CARE
Problem: Physical Therapy Goal  Goal: Physical Therapy Goal  Goals to be met by: 2 weeks     Patient will increase functional independence with mobility by performin. Supine to sit with [place]. met  2. Sit to supine with [place]. met  3. Sit to stand transfer with [place].  4. Bed to chair transfer with [place].  5. Gait  x 300 feet with Modified [place] with/without an assistive device.  6. Ascend/descend 1 flight of stairs with right Handrails modified independence.  7. Stand for 5 minutes with modified independence while performing a task (playing ladder ball, throwing beach ball). Met (10/5/2017)  8. Lower extremity exercise program x 20 standing exercises with unilateral UE support (hip flexion, hip abduction, heel raises). met  9. Pt will be able to ambulate in all directions x 20 feet to display the ability to change direction of ambulation without loss of balance (supervision).      LTGs remain appropriate. Pt will continue PT POC.    Stephanie Fuentes, PT  10/15/2017

## 2017-10-15 NOTE — PT/OT/SLP PROGRESS
"Occupational Therapy  Treatment    Otis Salcido   MRN: [id number]   Admitting Diagnosis: Nontraumatic intracerebral hemorrhage of cerebellum    OT Date of Treatment: 10/15/17  Total Time (min): 39 min    Billable Minutes:  Self Care/Home Management 10, Therapeutic Activity 8 and Neuromuscular Re-education 21    General Precautions: Standard, aspiration, fall  Orthopedic Precautions: N/A  Braces: N/A         Subjective:  Communicated with nurse prior to session.  "I've got all of those things."  Referring to DME - pt's wife passed away a few months ago and she used the recommended DME    Pain/Comfort  Pain Rating 1: 0/10  Pain Rating Post-Intervention 1: 0/10    Objective:  Patient found with:  (no lines - sitting in bed side chair)    Functional Status:  MDS G  Transfer Functional Status: mod(I) - (I)  Transfer Level of (A):  (Mod I for sit to stand and toilet with RW)    Walk in Room Functional Status: S-SBA  Walk In Room Level of (A):  (S using RW)    Walk in Corridor Functional Status: S-SBA  Walk In Corridor Level of (A):  (SBA using RW - no LOB noted)    Toilet Use Functional Status: mod(I) - (I)  Toilet Use Level of (A) :  (Mod I for toileting)    Personal Hygiene Functional Status: mod(I) - (I)  Personal Hygiene Level of (A) :  (Mod I to wash hands in stand)    Moving from seated to standing position: Steady at all times  Walking (with assistive device if used): Not steady, but able to stabilize without staff assistance  Turning around and facing the opposite direction while walking: Steady at all times  Moving on and off the toilet: Steady at all times  Surface-to-surface transfer (transfer between bed and chair or wheelchair): Steady at all times        Additional Treatment:  · Pt completed ADLs and func mobility activities for tx session as noted above  · Pt worked on improving motor control of L UE via NMR technqiues of proprioceptive input, WB, and repetitive activities - pt pushed UB ergometer on high " resistance in forward motion for 15 minutes with L UE only.  Pt also worked on activity involving shoulder flexion, elbow flex/ext, and shoulder abd/add via removing tubes of totem pole item while using a 3# wrist weight.  Pt initially with increased oscillations of L UE during all movements of activity with no weight, followed by oscillations primarily with shoulder flexed above 90 degrees, to significant decline in all oscillations when pt cued and responded to tightly grasp the tubes with whole palm (by tightening all muscles isometrically).  · OT discussed DME needs with patient - pt states he has a RW, BSC, and TTB available that his wife used (she passed away a few months ago) and therefore will not need any additional DME.  Pt also agrees to HHOT to begin with and to transition to OPOT as he states he has available transportation.   · Pt educated on role of OT and POC      Patient left up in chair with call button in reach and brother present    ASSESSMENT:  Otis Salcido is a 67 y.o. male with a medical diagnosis of Nontraumatic intracerebral hemorrhage of cerebellum.  Pt was agreeable to OT and was noted to make progress towards his goals in therapy.  Pt's goals remain appropriate at this time.  Pt is noted with making improvements with L UE motor control when following cues which is resulting in improved function with ADLs. He will continue to benefit from skilled OT services in order to assist him with increasing his safety and level of independence with self care and mobility tasks.       Rehab identified problem list/impairments: weakness, impaired endurance, impaired sensation, impaired self care skills, impaired functional mobilty, gait instability, impaired balance, decreased coordination, decreased upper extremity function, decreased lower extremity function, decreased safety awareness, impaired coordination, impaired fine motor    Rehab potential is good    Activity tolerance: Good    Discharge  recommendations: home health OT     Barriers to discharge: Decreased caregiver support     Equipment recommendations: bedside commode, bath bench     GOALS:    Occupational Therapy Goals        Problem: Occupational Therapy Goal    Goal Priority Disciplines Outcome Interventions   Occupational Therapy Goal     OT, PT/OT Ongoing (interventions implemented as appropriate)    Description:  Goals to be met by: 14 days     Patient will increase functional independence with ADLs by performing:    UE Dressing with Modified [place]. GOAL MET 10/09/17  LE Dressing with Modified [place]. GOAL MET 10/12/17  Grooming while standing with Modified [place]. 10/12/17  Toileting from toilet with Modified [place] for hygiene and clothing management. GOAL MET 10/11/17  Bathing from  shower chair/bench with Modified [place].  Toilet transfer to toilet with Modified [place]. GOAL MET 10/12/17  Pt will demonstrate good dynamic standing balance during functional task lasting 10 minutes GOAL MET 10/09/17                           Plan:  Patient to be seen  (5-6x/week) to address the above listed problems via self-care/home management, therapeutic activities, therapeutic exercises, neuromuscular re-education  Plan of Care expires: 11/03/17  Plan of Care reviewed with: patient    Key POWER Brandon, OT  10/15/2017

## 2017-10-16 LAB
ANION GAP SERPL CALC-SCNC: 10 MMOL/L
BASOPHILS # BLD AUTO: 0.04 K/UL
BASOPHILS NFR BLD: 0.7 %
BUN SERPL-MCNC: 7 MG/DL
CALCIUM SERPL-MCNC: 8.9 MG/DL
CHLORIDE SERPL-SCNC: 105 MMOL/L
CO2 SERPL-SCNC: 25 MMOL/L
CREAT SERPL-MCNC: 0.8 MG/DL
DIFFERENTIAL METHOD: ABNORMAL
EOSINOPHIL # BLD AUTO: 0.1 K/UL
EOSINOPHIL NFR BLD: 2.4 %
ERYTHROCYTE [DISTWIDTH] IN BLOOD BY AUTOMATED COUNT: 12.5 %
EST. GFR  (AFRICAN AMERICAN): >60 ML/MIN/1.73 M^2
EST. GFR  (NON AFRICAN AMERICAN): >60 ML/MIN/1.73 M^2
GLUCOSE SERPL-MCNC: 75 MG/DL
HCT VFR BLD AUTO: 37.8 %
HGB BLD-MCNC: 11.8 G/DL
LYMPHOCYTES # BLD AUTO: 1.8 K/UL
LYMPHOCYTES NFR BLD: 32.6 %
MAGNESIUM SERPL-MCNC: 1.7 MG/DL
MCH RBC QN AUTO: 28 PG
MCHC RBC AUTO-ENTMCNC: 31.2 G/DL
MCV RBC AUTO: 90 FL
MONOCYTES # BLD AUTO: 0.7 K/UL
MONOCYTES NFR BLD: 12.2 %
NEUTROPHILS # BLD AUTO: 2.9 K/UL
NEUTROPHILS NFR BLD: 52.1 %
PHOSPHATE SERPL-MCNC: 3.4 MG/DL
PLATELET # BLD AUTO: 198 K/UL
PMV BLD AUTO: 12.1 FL
POTASSIUM SERPL-SCNC: 3.4 MMOL/L
RBC # BLD AUTO: 4.21 M/UL
SODIUM SERPL-SCNC: 140 MMOL/L
WBC # BLD AUTO: 5.49 K/UL

## 2017-10-16 PROCEDURE — 25000003 PHARM REV CODE 250: Performed by: NURSE PRACTITIONER

## 2017-10-16 PROCEDURE — 97530 THERAPEUTIC ACTIVITIES: CPT

## 2017-10-16 PROCEDURE — 97535 SELF CARE MNGMENT TRAINING: CPT

## 2017-10-16 PROCEDURE — 97116 GAIT TRAINING THERAPY: CPT

## 2017-10-16 PROCEDURE — 25000003 PHARM REV CODE 250: Performed by: STUDENT IN AN ORGANIZED HEALTH CARE EDUCATION/TRAINING PROGRAM

## 2017-10-16 PROCEDURE — 97803 MED NUTRITION INDIV SUBSEQ: CPT

## 2017-10-16 PROCEDURE — 80048 BASIC METABOLIC PNL TOTAL CA: CPT

## 2017-10-16 PROCEDURE — 83735 ASSAY OF MAGNESIUM: CPT

## 2017-10-16 PROCEDURE — 36415 COLL VENOUS BLD VENIPUNCTURE: CPT

## 2017-10-16 PROCEDURE — 85025 COMPLETE CBC W/AUTO DIFF WBC: CPT

## 2017-10-16 PROCEDURE — 12000000 HC SNF SEMI-PRIVATE ROOM

## 2017-10-16 PROCEDURE — 84100 ASSAY OF PHOSPHORUS: CPT

## 2017-10-16 PROCEDURE — 97110 THERAPEUTIC EXERCISES: CPT

## 2017-10-16 RX ORDER — RIVAROXABAN 15 MG/1
TABLET, FILM COATED ORAL
Refills: 0
Start: 2017-10-16

## 2017-10-16 RX ORDER — LISINOPRIL 40 MG/1
40 TABLET ORAL DAILY
Qty: 30 TABLET | Refills: 1 | Status: SHIPPED | OUTPATIENT
Start: 2017-10-16

## 2017-10-16 RX ORDER — LEVETIRACETAM 250 MG/1
TABLET ORAL
Qty: 90 TABLET | Refills: 1 | Status: SHIPPED | OUTPATIENT
Start: 2017-10-16

## 2017-10-16 RX ORDER — POTASSIUM CHLORIDE 20 MEQ/1
40 TABLET, EXTENDED RELEASE ORAL ONCE
Status: COMPLETED | OUTPATIENT
Start: 2017-10-16 | End: 2017-10-16

## 2017-10-16 RX ORDER — AMOXICILLIN 250 MG
1 CAPSULE ORAL DAILY PRN
COMMUNITY
Start: 2017-10-16

## 2017-10-16 RX ORDER — ATORVASTATIN CALCIUM 40 MG/1
40 TABLET, FILM COATED ORAL NIGHTLY
Qty: 30 TABLET | Refills: 1 | Status: SHIPPED | OUTPATIENT
Start: 2017-10-16 | End: 2018-10-16

## 2017-10-16 RX ORDER — ASPIRIN 81 MG/1
TABLET ORAL
Refills: 0 | COMMUNITY
Start: 2017-10-16

## 2017-10-16 RX ORDER — AMLODIPINE BESYLATE 10 MG/1
10 TABLET ORAL DAILY
Qty: 30 TABLET | Refills: 1 | Status: SHIPPED | OUTPATIENT
Start: 2017-10-16 | End: 2018-10-16

## 2017-10-16 RX ADMIN — POTASSIUM CHLORIDE 40 MEQ: 1500 TABLET, EXTENDED RELEASE ORAL at 12:10

## 2017-10-16 RX ADMIN — LEVETIRACETAM 250 MG: 250 TABLET, FILM COATED ORAL at 10:10

## 2017-10-16 RX ADMIN — LISINOPRIL 40 MG: 20 TABLET ORAL at 09:10

## 2017-10-16 RX ADMIN — LEVETIRACETAM 500 MG: 500 TABLET, FILM COATED ORAL at 06:10

## 2017-10-16 RX ADMIN — AMLODIPINE BESYLATE 10 MG: 10 TABLET ORAL at 09:10

## 2017-10-16 RX ADMIN — ATORVASTATIN CALCIUM 40 MG: 20 TABLET, FILM COATED ORAL at 10:10

## 2017-10-16 NOTE — PLAN OF CARE
Problem: Occupational Therapy Goal  Goal: Occupational Therapy Goal  Goals to be met by: 14 days     Patient will increase functional independence with ADLs by performing:    UE Dressing with Modified [place]. GOAL MET 10/09/17  LE Dressing with Modified [place]. GOAL MET 10/12/17  Grooming while standing with Modified [place]. 10/12/17  Toileting from toilet with Modified [place] for hygiene and clothing management. GOAL MET 10/11/17  Bathing from  shower chair/bench with Modified [place].  Toilet transfer to toilet with Modified [place]. GOAL MET 10/12/17  Pt will demonstrate good dynamic standing balance during functional task lasting 10 minutes GOAL MET 10/09/17          Outcome: Ongoing (interventions implemented as appropriate)  Mir Chavez OTR/L      10/16/2017

## 2017-10-16 NOTE — PLAN OF CARE
Problem: Patient Care Overview  Goal: Plan of Care Review  Outcome: Ongoing (interventions implemented as appropriate)   10/16/17 0542   Coping/Psychosocial   Plan Of Care Reviewed With patient   Pt remained free from falls, injury and trauma throughout shift. Pt AAO x 4. Bed in lowest position and wheels locked. Pt instructed to call for assistance. Hourly rounds to monitor pt for safety and comfort. Personal items and call bell within reach. Pt denies pain. No signs or symptoms of distress.  Will continue to monitor pt.

## 2017-10-16 NOTE — PLAN OF CARE
[facility] HOME HEALTH ORDERS  FACE TO FACE ENCOUNTER    Patient Name: Otis Salcido  YOB: 1950    PCP: Primary Doctor No   PCP Address: None  PCP Phone Number: None  PCP Fax: None    Encounter Date: 10/16/2017    Admit to Home Health    Diagnoses:  Active Hospital Problems    Diagnosis  POA    *Nontraumatic intracerebral hemorrhage of cerebellum [I61.4]  Yes     Priority: 1 - High    Debility [R53.81]  Yes     Priority: 2     History of deep venous thrombosis (DVT) of distal vein of left lower extremity 11/2016 [Z86.718]  Not Applicable    Essential hypertension [I10]  Yes    Prostate cancer s/p treatment, currently under PSA surveillance [C61]  Yes     Chronic    High cholesterol [E78.00]  Yes    History of blood clots [Z86.718]  Not Applicable     lungs        Resolved Hospital Problems    Diagnosis Date Resolved POA   No resolved problems to display.       Future Appointments  Date Time Provider Department Center   11/6/2017 12:45 PM [facility] MRI4 [facility] MRI [place] [facility]   11/6/2017 2:00 PM Paul Arriaga MD [facility] NEUROS7 [facility] [place]     Follow-up Information     Paul Arriaga MD. Go on 11/6/2017.    Specialty:  Neurosurgery  Why:  Neurosurgery follow-up  Contact information:  [address]  [facility] [id number] [phone]               I have seen and examined this patient face to face today. My clinical findings that support the need for the home health skilled services and home bound status are the following:  Weakness/numbness causing balance and gait disturbance due to Stroke making it taxing to leave home.    Allergies:  Review of patient's allergies indicates:   Allergen Reactions    Iodine and iodide containing products Anaphylaxis       Diet: cardiac diet    Activities: activity as tolerated and no lifting, Driving, or Strenuous exercise    Nursing:   SN to complete comprehensive assessment including routine vital signs. Instruct on disease  process and s/s of complications to report to MD. Review/verify medication list sent home with the patient at time of discharge  and instruct patient/caregiver as needed. Frequency may be adjusted depending on start of care date.    Notify MD if SBP > 160 or < 90; DBP > 90 or < 50; HR > 120 or < 50; Temp > 101      CONSULTS:    Physical Therapy to evaluate and treat. Evaluate for home safety and equipment needs; Establish/upgrade home exercise program. Perform / instruct on therapeutic exercises, gait training, transfer training, and Range of Motion.  Occupational Therapy to evaluate and treat. Evaluate home environment for safety and equipment needs. Perform/Instruct on transfers, ADL training, ROM, and therapeutic exercises.  Aide to provide assistance with personal care, ADLs, and vital signs.    MISCELLANEOUS CARE:  N/A    WOUND CARE ORDERS  n/a      Medications: Review discharge medications with patient and family and provide education.      Current Discharge Medication List      START taking these medications    Details   atorvastatin (LIPITOR) 40 MG tablet Take 1 tablet (40 mg total) by mouth every evening.  Qty: 30 tablet, Refills: 1      levetiracetam (KEPPRA) 250 MG Tab Take 2 tabs in the Morning and take 1 tab at night  Qty: 90 tablet, Refills: 1      senna-docusate 8.6-50 mg (PERICOLACE) 8.6-50 mg per tablet Take 1 tablet by mouth daily as needed for Constipation.         CONTINUE these medications which have CHANGED    Details   amlodipine (NORVASC) 10 MG tablet Take 1 tablet (10 mg total) by mouth once daily.  Qty: 30 tablet, Refills: 1      aspirin (ECOTRIN) 81 MG EC tablet DO Not take until cleared by Neurosurgery  Refills: 0      lisinopril (PRINIVIL,ZESTRIL) 40 MG tablet Take 1 tablet (40 mg total) by mouth once daily.  Qty: 30 tablet, Refills: 1      XARELTO 15 mg Tab DO NOT take until cleared by Neurosurgey  Refills: 0         STOP taking these medications       NASONEX 50 mcg/actuation nasal  spray Comments:   Reason for Stopping:         sildenafil (VIAGRA) 100 MG tablet Comments:   Reason for Stopping:               I certify that this patient is confined to his home and needs intermittent skilled nursing care, physical therapy and occupational therapy.

## 2017-10-16 NOTE — PLAN OF CARE
Problem: Physical Therapy Goal  Goal: Physical Therapy Goal  Goals to be met by: 2 weeks     Patient will increase functional independence with mobility by performin. Supine to sit with [place]. met  2. Sit to supine with [place]. met  3. Sit to stand transfer with [place].  4. Bed to chair transfer with [place].  5. Gait  x 300 feet with Modified [place] with/without an assistive device.  6. Ascend/descend 1 flight of stairs with right Handrails modified independence.  7. Stand for 5 minutes with modified independence while performing a task (playing ladder ball, throwing beach ball). Met (10/5/2017)  8. Lower extremity exercise program x 20 standing exercises with unilateral UE support (hip flexion, hip abduction, heel raises). met  9. Pt will be able to ambulate in all directions x 20 feet to display the ability to change direction of ambulation without loss of balance (supervision).      Outcome: Ongoing (interventions implemented as appropriate)  Goals remain appropriate

## 2017-10-16 NOTE — PT/OT/SLP PROGRESS
"Physical Therapy  Treatment    Otis Salcido   MRN: [id number]   Admitting Diagnosis: Nontraumatic intracerebral hemorrhage of cerebellum    PT Received On: 10/16/17  Total Time (min): 53       Billable Minutes:53    Gait [id number], Therapeutic Activity 13 and Therapeutic Exercise 25    Treatment Type: Treatment  PT/PTA: PTA     PTA Visit Number: 1       General Precautions: Standard, aspiration, fall  Orthopedic Precautions: N/A   Braces: N/A    Do you have any cultural, spiritual, Baptism conflicts, given your current situation?: no    Subjective:  "feel good"      Pain/Comfort  Pain Rating 1: 0/10  Pain Rating Post-Intervention 1: 0/10    Objective:   Patient found with:  (in wc)       Functional Status:  MDS G  Transfer Functional Status: S-SBA  Walk in Corridor Functional Status: S-SBA          Transfers:  Sit<>Stand: S with RW  WC>BSC no AD S    Gait:  Amb with RW S ~ 300 ft no LOB , ~ 125 ft with no AD close SBA, LOB x 1 requiring pt to grab jones rail to recover    Advanced Gait:  Stairs: asc/[place] 4 6" steps (x3 trials-12 steps) with RHR S    Therex:  x15 reps heel raises,mini squats,abd/add,MIP with BUE support    Balance:  Dyn standing act with/with out uni lat support tossing bean bags in basket x 6 minutes SBA    Additional Treatment:  LBE x 15     Patient left up in chair with call button in reach and belonging in reach.    Assessment:  Otis Salcido is a 67 y.o. male with a medical diagnosis of Nontraumatic intracerebral hemorrhage of cerebellum.  Pt tolerated well, pt would continue to benefit from skilled PT services to improve overall functional mobility, strength and endurance.  .    Rehab identified problem list/impairments: weakness, impaired endurance, impaired self care skills, impaired functional mobilty, gait instability, decreased coordination, decreased upper extremity function, decreased lower extremity function, decreased safety awareness, impaired fine motor    Rehab potential is " good.    Activity tolerance: Good    Discharge recommendations: home health PT     Barriers to discharge: Decreased caregiver support (lives alone)    Equipment recommendations: none     GOALS:    Physical Therapy Goals        Problem: Physical Therapy Goal    Goal Priority Disciplines Outcome Goal Variances Interventions   Physical Therapy Goal     PT/OT, PT Ongoing (interventions implemented as appropriate)     Description:  Goals to be met by: 2 weeks     Patient will increase functional independence with mobility by performin. Supine to sit with [place]. met  2. Sit to supine with [place]. met  3. Sit to stand transfer with [place].  4. Bed to chair transfer with [place].  5. Gait  x 300 feet with Modified [place] with/without an assistive device.  6. Ascend/descend 1 flight of stairs with right Handrails modified independence.  7. Stand for 5 minutes with modified independence while performing a task (playing ladder ball, throwing beach ball). Met (10/5/2017)  8. Lower extremity exercise program x 20 standing exercises with unilateral UE support (hip flexion, hip abduction, heel raises). met  9. Pt will be able to ambulate in all directions x 20 feet to display the ability to change direction of ambulation without loss of balance (supervision).                       PLAN:    Patient to be seen  (5-6x/wk)  to address the above listed problems via gait training, therapeutic activities, therapeutic exercises, neuromuscular re-education  Plan of Care expires: 17  Plan of Care reviewed with:      Amira Salazar PTA  10/16/2017

## 2017-10-16 NOTE — PROGRESS NOTES
"[facility]  Adult Nutrition  Progress Note    SUMMARY     Recommendations    Recommendation/Intervention: 1. Continue cardiac diet 2. rec boost vanilla TID 3. RD to monitor  Goals: > 85% of EEN/EPN  Nutrition Goal Status: goal met     Reason for Assessment  Reason for Assessment: RD follow-up  Diagnosis:  (Neutramatic intracerebral hemmorrhage of cerebellum)  Relevent Medical History: HTN,HTL, prostate cancer   Interdisciplinary Rounds: attended     General Information Comments: Pt states no issues chewing or swallowing. Pt states decreased appetite.     Nutrition Discharge Planning: DC on cardiac diet, pt has no intention of following, discussed today    Nutrition Prescription Ordered    Current Diet Order: Cardiac          Oral Nutrition Supplement: none     Evaluation of Received Nutrients/Fluid Intake         Energy Calories Required: meeting needs         Protein Required: meeting needs, not meeting needs           Fluid Required: meeting needs     Tolerance: tolerating     % Intake of Estimated Energy Needs: 75 - 100 %  % Meal Intake: 75%     Nutrition Risk Screen     Nutrition Risk Screen: no indicators present    Nutrition/Diet History    Patient Reported Diet/Restrictions/Preferences: general     Food Preferences: No Mu-ism or cultural food preferences     Labs/Tests/Procedures/Meds   Pertinent Labs Reviewed: reviewed, pertinent  Pertinent Labs Comments: Hg 11.8, Hct 37.8, k 3.4  Pertinent Medications Reviewed: reviewed, pertinent  Pertinent Medications Comments: KCl    Physical Findings  Overall Physical Appearance: loss of muscle mass  Tubes:  (-)  Oral/Mouth Cavity: WDL  Skin: intact  Anthropometrics   Height: 5' 7.01" (170.2 cm)  Weight Method: Standard Scale  Weight: 74.3 kg (163 lb 12.8 oz)  Ideal Body Weight (IBW), Male: 148.06 lb     % Ideal Body Weight, Male (lb): 110.63 lb     BMI (Calculated): 25.7  BMI Grade: 25 - 29.9 - overweight  Weight Loss:  (pt reports he gained wt " earlier this yr but lost back to UBW)  Usual Body Weight (UBW), kg:  (73kg)      Estimated/Assessed Needs    Weight Used For Calorie Calculations: 74.3 kg (163 lb 12.8 oz)   Height (cm): 170.2 cm  Energy Calorie Requirements (kcal): 1833kcal/day (mifflin x 1.25(PAL))   RMR ([place]-St. Jeor Equation): 1466.75     Protein Requirements: 82-99 grams protein/ day (1.0-1.2 gm/kg)    Fluid Requirements (mL): Or per MD  Fluid Need Method: RDA Method    RDA Method (mL): 1833     Assessment and Plan    Inadequate energy intake related to decreased appetite as evidence by 25% PO intake.        Monitor and Evaluation  Food and Nutrient Intake: food and beverage intake    Physical Activity and Function: nutrition-related ADLs and IADLs  Anthropometric Measurements: weight, weight change  Biochemical Data, Medical Tests and Procedures: electrolyte and renal panel, gastrointestinal profile, glucose/endocrine profile, inflammatory profile   Nutrition Risk  Level of Risk:  (follow one time per week)  Nutrition Follow-Up  RD Follow-up?: Yes

## 2017-10-16 NOTE — PLAN OF CARE
Problem: Patient Care Overview  Goal: Plan of Care Review  Outcome: Ongoing (interventions implemented as appropriate)  Continue current cardiac diet  Goals: > 85% of EEN/EPN  Nutrition Goal Status: progressing towards goal

## 2017-10-16 NOTE — PROGRESS NOTES
10/16/2017  1:34 PM    Discharge Planning- Message left with urology ([id number]) for a follow up appt for patient.  Jemma Aguilera RN, CM Skilled  [id number]    Received a call back-patient to follow up with Dr Prabhakar on 10/24/2017 at 3:30p  Jemma Aguilera RN, CM Skilled  [id number]

## 2017-10-16 NOTE — PT/OT/SLP PROGRESS
Occupational Therapy  Treatment    Otis Salcido   MRN: [id number]   Admitting Diagnosis: Nontraumatic intracerebral hemorrhage of cerebellum    OT Date of Treatment: 10/16/17  Total Time (min): 46 min    Billable Minutes:  Self Care/Home Management 38 and Therapeutic Activity 8    General Precautions: Standard, aspiration, fall, NPO  Orthopedic Precautions: N/A  Braces: N/A         Subjective:  Communicated with nurse prior to session.      Pain/Comfort  Pain Rating 1: 0/10    Objective:       Functional Status:  MDS G  Bed Mobility Functional Status: Mod(I) (all bed mobility.)    Transfer Functional Status: S-SBA (with RW to all surfaces.)    Walk in Room Functional Status: S-SBA (Pt ambulated 18 ft from EOB to sink with RW.)    Dressing Functional Status: S-SBA (Mod (I) with UBD with (S) donning pants socks and underwear.)    Toilet Use Functional Status: Mod(I) (using BSC with RW to steady.)    Personal Hygiene Functional Status: Mod(I) -(performing all aspectsstanding sinkside.)    Bathing Functional Status: S-SBA (with TTB and Grab bar for safety when showering.)      Moving from seated to standing position: Steady at all times  Walking (with assistive device if used): Not steady, but able to stabilize without staff assistance  Turning around and facing the opposite direction while walking: Steady at all times  Moving on and off the toilet: Steady at all times  Surface-to-surface transfer (transfer between bed and chair or wheelchair): Steady at all times        Patient left up in chair with call button in reach and nurse notified    ASSESSMENT:  Otis Salcido is a 67 y.o. male with a medical diagnosis of Nontraumatic intracerebral hemorrhage of cerebellum .  Pt was agreeable to OT and tolerated Tx without incident but continues to require (A) to perform functional activities to completion as well as to safely perform functional mobility and functional transfers.  Pt is making slow progress but continues to  require OT Intervention to perform functional transfers, functional standing activities, and self care tasks with standing component more independently. OT is recommended to further h functional (I)ce and safety. Goals remain appropriate and continued OT is recommended.    Rehab identified problem list/impairments: weakness, impaired endurance, impaired sensation, impaired self care skills, impaired functional mobilty, gait instability, impaired balance, decreased coordination, decreased upper extremity function, decreased lower extremity function, decreased safety awareness, impaired coordination, impaired fine motor    Rehab potential is good    Activity tolerance: Good    Discharge recommendations: home health OT     Barriers to discharge: Decreased caregiver support     Equipment recommendations: bedside commode, bath bench     GOALS:    Occupational Therapy Goals        Problem: Occupational Therapy Goal    Goal Priority Disciplines Outcome Interventions   Occupational Therapy Goal     OT, PT/OT Ongoing (interventions implemented as appropriate)    Description:  Goals to be met by: 14 days     Patient will increase functional independence with ADLs by performing:    UE Dressing with Modified [place]. GOAL MET 10/09/17  LE Dressing with Modified [place]. GOAL MET 10/12/17  Grooming while standing with Modified [place]. 10/12/17  Toileting from toilet with Modified [place] for hygiene and clothing management. GOAL MET 10/11/17  Bathing from  shower chair/bench with Modified [place].  Toilet transfer to toilet with Modified [place]. GOAL MET 10/12/17  Pt will demonstrate good dynamic standing balance during functional task lasting 10 minutes GOAL MET 10/09/17                           Plan:  Patient to be seen  (5-6x/week) to address the above listed problems via self-care/home management, therapeutic activities, therapeutic exercises, neuromuscular re-education  Plan of Care expires:  11/03/17  Plan of Care reviewed with: patient    Mir Chavez, OTR/CRISTOFER  10/16/2017

## 2017-10-17 ENCOUNTER — PATIENT OUTREACH (OUTPATIENT)
Dept: ADMINISTRATIVE | Facility: CLINIC | Age: 67
End: 2017-10-17

## 2017-10-17 VITALS
WEIGHT: 163.81 LBS | DIASTOLIC BLOOD PRESSURE: 65 MMHG | RESPIRATION RATE: 18 BRPM | TEMPERATURE: 98 F | HEART RATE: 70 BPM | SYSTOLIC BLOOD PRESSURE: 137 MMHG | OXYGEN SATURATION: 97 % | HEIGHT: 67 IN | BODY MASS INDEX: 25.71 KG/M2

## 2017-10-17 PROCEDURE — 97110 THERAPEUTIC EXERCISES: CPT

## 2017-10-17 PROCEDURE — 25000003 PHARM REV CODE 250: Performed by: NURSE PRACTITIONER

## 2017-10-17 PROCEDURE — 25000003 PHARM REV CODE 250: Performed by: STUDENT IN AN ORGANIZED HEALTH CARE EDUCATION/TRAINING PROGRAM

## 2017-10-17 PROCEDURE — 97112 NEUROMUSCULAR REEDUCATION: CPT

## 2017-10-17 PROCEDURE — 97116 GAIT TRAINING THERAPY: CPT

## 2017-10-17 PROCEDURE — 97530 THERAPEUTIC ACTIVITIES: CPT

## 2017-10-17 PROCEDURE — 99315 NF DSCHRG MGMT 30 MIN/LESS: CPT | Mod: ,,, | Performed by: INTERNAL MEDICINE

## 2017-10-17 RX ADMIN — AMLODIPINE BESYLATE 10 MG: 10 TABLET ORAL at 08:10

## 2017-10-17 RX ADMIN — LEVETIRACETAM 500 MG: 500 TABLET, FILM COATED ORAL at 06:10

## 2017-10-17 RX ADMIN — LISINOPRIL 40 MG: 20 TABLET ORAL at 08:10

## 2017-10-17 NOTE — PT/OT/SLP PROGRESS
"Occupational Therapy  Treatment    Otis Salcido   MRN: [id number]   Admitting Diagnosis: Nontraumatic intracerebral hemorrhage of cerebellum    OT Date of Treatment: 10/17/17  Total Time (min): 45 min    Billable Minutes:  Therapeutic Activity 15  Neuromuscular 30 min    General Precautions: Standard, aspiration, fall, NPO  Orthopedic Precautions: N/A  Braces: N/A         Subjective:  Communicated with nurse prior to session.  "I'm going home today.  My daughter is getting me."    Pain/Comfort  Pain Rating 1: 0/10  Pain Rating Post-Intervention 1: 0/10    Objective:  Patient found with:  (no lines - sitting in bedside chair)    Functional Status:  MDS G  Transfer Functional Status: S-SBA  Transfer Level of (A):  (S to mod I for sit to stand and toilet - Mod I with RW and S)    Walk in Room Functional Status: S-SBA  Walk In Room Level of (A):  (S using RW)    Walk in Corridor Functional Status: S-SBA  Walk In Corridor Level of (A):  (S using RW)    Toilet Use Functional Status: mod(I) - (I)  Toilet Use Level of (A) :  (Mod I for toileting)    Moving from seated to standing position: Steady at all times  Walking (with assistive device if used): Not steady, but able to stabilize without staff assistance  Turning around and facing the opposite direction while walking: Not steady, but able to stabilize without staff assistance  Moving on and off the toilet: Not steady, but able to stabilize without staff assistance  Surface-to-surface transfer (transfer between bed and chair or wheelchair): Not steady, but able to stabilize without staff assistance        Additional Treatment:  · Pt continues to work on improving motor control and function of B UEs, primarily his L UE.  Pt worked on NMR techniques to gain function and control, to include activities of repetitive nature, WB and increased proprioceptive input.  Activities included: 15 min on UBE with high resistance using L UE only without stopping, AROM using 6# medicine " ball in all planes with B hold (stopping at 15 degree intervals), and catching/tossing weighted medicine ball (6#).  Pt noted with improved gross motor control of UE with less notable oscillations during activities.   · Pt used RW to walk from room to gym but cruised (with hand on wall or table) from mat table to seat in front of UBE without AD with slight instability but no LOB. Pt continues to elevate R UE when walking and extends B elbows with RW ahead of him - pt continues to require cues to relax hold on RW and R shoulder    Patient left up in chair with call button in reach    ASSESSMENT:  Otis Salcido is a 67 y.o. male with a medical diagnosis of Nontraumatic intracerebral hemorrhage of cerebellum.  Pt was agreeable to OT and was noted to make progress towards his goals in therapy.  Pt met all OT goals during his stay except for bathing and is discharged to go home today.  He will continue to benefit from skilled OT services in order to assist him with increasing his safety and level of independence with self care and mobility tasks in his home setting via  HHOT with upgrade to OPOT once stable.   During his length of stay, pt has demonstrated improved balance and motor control with carry over to improve his functional mobility and self care tasks.          Rehab identified problem list/impairments: weakness, impaired endurance, impaired sensation, impaired self care skills, impaired functional mobilty, gait instability, impaired balance, decreased coordination, decreased upper extremity function, decreased lower extremity function, decreased safety awareness, impaired coordination, impaired fine motor    Rehab potential is good    Activity tolerance: Good    Discharge recommendations: home health OT     Barriers to discharge: Decreased caregiver support     Equipment recommendations: bedside commode, bath bench     GOALS:    Occupational Therapy Goals     Not on file          Multidisciplinary Problems  (Resolved)        Problem: Occupational Therapy Goal    Goal Priority Disciplines Outcome Interventions   Occupational Therapy Goal   (Resolved)     OT, PT/OT Outcome(s) achieved    Description:  Goals to be met by: 14 days     Patient will increase functional independence with ADLs by performing:    UE Dressing with Modified [place]. GOAL MET 10/09/17  LE Dressing with Modified [place]. GOAL MET 10/12/17  Grooming while standing with Modified [place]. 10/12/17  Toileting from toilet with Modified [place] for hygiene and clothing management. GOAL MET 10/11/17  Bathing from  shower chair/bench with Modified [place].  Toilet transfer to toilet with Modified [place]. GOAL MET 10/12/17  Pt will demonstrate good dynamic standing balance during functional task lasting 10 minutes GOAL MET 10/09/17                           Plan:  Discharge to [facility]    Key Taylor OT  10/17/2017

## 2017-10-17 NOTE — PLAN OF CARE
10/17/2017   9:31 AM    ORESTES faxed home health referral (facesheet, H&P, and HH orders) to [facility] (fx [phone]).  Montse with [facility] notified ORESTES by phone this morning that pt has been placed with [facility].  Per therapy, pt does not require DME to be ordered for SNF d/c.  Pt to be accompanied and transported home by family upon SNF d/c today.  SW remains available for further d/c planning assistance and will f/u if needed.    Ricardo Holguin, [facility]  [phone]

## 2017-10-17 NOTE — PLAN OF CARE
10/17/2017  10:19 AM    Patient to discharge home today with assist of family. Patient set up with [facility] per ORESTES Silva.    Future Appointments  Date Time Provider Department Center   10/23/2017 9:00 AM Eric Beltran Jr., MD [facility] IM [facility] PCW   10/24/2017 3:00 PM Steve Valencia Jr., MD [facility] RAD ONC [facility]   11/6/2017 12:45 PM [facility] MRI4 [facility] MRI [facility]   11/6/2017 2:00 PM Paul Arriaga MD [facility] NEUROS7 [facility]           10/17/17 1018   Final Note   Assessment Type Final Discharge Note   Discharge Disposition Home   What phone number can be called within the next 1-3 days to see how you are doing after discharge? [phone]   Hospital Follow Up  Appt(s) scheduled? Yes   Discharge plans and expectations educations in teach back method with documentation complete? Yes     Jemma Aguilera RN, CM Skilled  [id number]

## 2017-10-17 NOTE — DISCHARGE SUMMARY
[facility]  Department of Hospital Medicine  Discharge Summary      Patient Name: Otis Salcido  MRN: [id number]  Admission Date: 10/3/2017  Hospital Length of Stay: 14 days  Discharge Date and Time: 10/17/2017 12:45 PM  Attending Physician: Kathy Mora MD  Discharging Provider: Rosa French NP  Primary Care Provider: Primary Doctor No    Chief Complaint/Reason for Admission: Debility    History of Present Illness:  Patient is a 67 y.o. male who has a past medical history of Glaucoma; High cholesterol; History of blood clots (on Xarelto); Hypertension; Kidney stone; and Prostate cancer s/p resection presented with headache and found to have left-sided nontraumatic intracerebral hemorrhage of cerebellum. Neurosurgery and vascular neurology were consulted and recommended non surgical management. Etiology of ICH likely hypertensive vs ischemic stroke with hemorrhagic transformation while on rivaroxaban. Patient has been working with PT/OT who recommend inpatient rehab for further balance/mobility training however insurance denied admission. Patient now being admitted to SNF for rehab. Patient lives in apartment alone and he was independent with ADLs prior to admission. Patient currently denies any fever/chills, chest pain, dyspnea, abdominal pain, nausea/vomiting.      Hospital Course:   10/3/17: Patient admitted to SNF for ongoing PT/OT following a hospitalization for left-sided nontraumatic intracerebral hemorrhage of cerebellum.  10/5: Patient seen during therapy, therapist concern patient c/o headache and dizziness, upon further interviewed patient explained he has been having a headache every morning since going to main campus along with dizziness. Headache and dizziness isn't new and both have not worsen since onset. Discussed with patient s/s of worsening hemorrhage and instructed patient to inform staff if occurs. Verbalized understanding.     10/9: hypotensive adjusted hydralazine    10/11: hypotensive stopped hydralazine  10/12: BP improved, patient doing well no complaints, labs reviewed--K+ replaced  10/17: Patient doing well, discussed discharged, discharge home today     Significant Diagnostic Studies:    Recent Labs  Lab 10/12/17  0437 10/16/17  0450   WBC 5.42 5.49   HGB 12.0* 11.8*   HCT 37.8* 37.8*    198       Recent Labs  Lab 10/12/17  0437 10/16/17  0450    140   K 3.4* 3.4*    105   CO2 29 25   BUN 10 7*   CREATININE 0.9 0.8   CALCIUM 8.6* 8.9     Lab Results   Component Value Date    LABPROT 10.6 09/28/2017    ALBUMIN 3.1 (L) 10/03/2017     Lab Results   Component Value Date    CALCIUM 8.9 10/16/2017    PHOS 3.4 10/16/2017     Results for ALIDA RUFF (MRN [id number]) as of 10/17/2017 15:16   Ref. Range 10/9/2017 04:58 10/12/2017 04:37 10/16/2017 04:50   Magnesium Latest Ref Range: 1.6 - 2.6 mg/dL 2.1 1.9 1.7       Final Active Diagnoses:    Diagnosis Date Noted POA    PRINCIPAL PROBLEM:  Nontraumatic intracerebral hemorrhage of cerebellum [I61.4] 10/03/2017 Yes    Debility [R53.81] 10/04/2017 Yes    History of deep venous thrombosis (DVT) of distal vein of left lower extremity 11/2016 [Z86.718]  Not Applicable    Essential hypertension [I10] 03/04/2013 Yes    Prostate cancer s/p treatment, currently under PSA surveillance [C61] 03/04/2013 Yes     Chronic    High cholesterol [E78.00] 03/04/2013 Yes    History of blood clots [Z86.718] 03/04/2013 Not Applicable      Problems Resolved During this Admission:    Diagnosis Date Noted Date Resolved POA      * Nontraumatic intracerebral hemorrhage of cerebellum    -Neurologically stable  -Continue neuro checks  -Continue to hold anticoagulation  -Continue Keppra for seizure prophylaxis   -Continue with PT/OT for gait training and strengthening and restoration of ADL's   -Continue DVT prophylaxis with TEDs/SCD  -Follow up in neurosurgery clinic in 8 weeks with MRI brain with Dr. Hall  -fall precautions    -delirium precautions   -continue tylenol for headaches  -continue bowel regimen with senokot-s         Debility    -Continue PT/OT as above.         History of deep venous thrombosis (DVT) of distal vein of left lower extremity 11/2016    -Plan as above.         History of blood clots    -Holding anticoagulation for now until cleared by neurosurgery.   -continue TEDs and SCDs        High cholesterol    -Continue home atorvastatin 40mg daily to treat.         Essential hypertension    -BP better controlled, patient with hypotension earlier in the week  -Continue amlodipine 10mg daily and lisinopril 40mg daily to treat.   -stopped hydralazine  -Continue to monitor and adjust as needed.         Prostate cancer s/p treatment, currently under PSA surveillance    -No acute issues  -Follow up outpatient with urology        PT note, ALIYA Salazar, PTA 10/17/17  Functional Status:  MDS G  Transfer Functional Status: S-SBA  Walk in Room Functional Status: S-SBA  Walk in Corridor Functional Status: S-SBA  Transfers:  Sit<>Stand: with/without RW S  Stand Pivot Transfer: close S ~ 5 ft WC>BSchair, S with RW back to WC  Gait:  Amb with RW S no LOB ~ 285 ft and 150 ft no AD CG/close SBA unsteady but no major LOB  Discharge recommendations: home health PT       OT note, GREER Taylor, OT 10/17/17  General Precautions: Standard, aspiration, fall, NPO  Orthopedic Precautions: N/A  Braces: N/A  Functional Status:  MDS G  Transfer Functional Status: S-SBA  Transfer Level of (A):  (S to mod I for sit to stand and toilet - Mod I with RW and S)     Walk in Room Functional Status: S-SBA  Walk In Room Level of (A):  (S using RW)     Walk in Corridor Functional Status: S-SBA  Walk In Corridor Level of (A):  (S using RW)     Toilet Use Functional Status: mod(I) - (I)  Toilet Use Level of (A) :  (Mod I for toileting)     Moving from seated to standing position: Steady at all times  Walking (with assistive device if used): Not steady, but able  to stabilize without staff assistance  Turning around and facing the opposite direction while walking: Not steady, but able to stabilize without staff assistance  Moving on and off the toilet: Not steady, but able to stabilize without staff assistance  Surface-to-surface transfer (transfer between bed and chair or wheelchair): Not steady, but able to stabilize without staff assistance  Discharge recommendations: home health OT     Discharged Condition: stable    Disposition: Home-Health Care [facility]    Follow Up:  Follow-up Information     Paul Arriaga MD. Go on 11/6/2017.    Specialty:  Neurosurgery  Why:  Neurosurgery follow-up  Contact information:  [address]  [facility] [address]  [phone]             Primary Doctor No.           [facility].    Specialties:  Home Health Services, Physical Therapy, Occupational Therapy  Why:  Home Health Questions/Concerns  Contact information:  [address]  [address]  [facility] [address]  [phone]                 Future Appointments  Date Time Provider Department Center   10/23/2017 9:00 AM Eric Beltran Jr., MD [facility] IM [facility] PCW   10/24/2017 3:00 PM Steve Valencia Jr., MD [facility] RAD ONC [facility]   11/6/2017 12:45 PM [facility] MRI4 [facility] MRI [facility]   11/6/2017 2:00 PM Paul Arriaga MD [facility] NEUROS7 [facility]       Patient Instructions:     Diet general   Order Specific Question Answer Comments   Additional restrictions: Cardiac (Low Na/Chol)      Call MD for:  temperature >100.4     Call MD for:  persistent nausea and vomiting or diarrhea     Call MD for:  severe uncontrolled pain     Call MD for:  difficulty breathing or increased cough     Call MD for:  severe persistent headache     Call MD for:  persistent dizziness, light-headedness, or visual disturbances     Call MD for:  increased confusion or weakness     Call MD for:   Order Comments: Face: drooping face  Arms: unable to move or numbness and tingling in arms  "or legs  Speech: unable to speak or not making sense when talking  Time: need to know time of last "normal" and when symptoms began       Medications:  Reconciled Home Medications:   Discharge Medication List as of 10/17/2017 11:23 AM      START taking these medications    Details   atorvastatin (LIPITOR) 40 MG tablet Take 1 tablet (40 mg total) by mouth every evening., Starting Mon 10/16/2017, Until Tue 10/16/2018, Normal      levetiracetam (KEPPRA) 250 MG Tab Take 2 tabs in the Morning and take 1 tab at night, Normal      senna-docusate 8.6-50 mg (PERICOLACE) 8.6-50 mg per tablet Take 1 tablet by mouth daily as needed for Constipation., Starting Mon 10/16/2017, OTC         CONTINUE these medications which have CHANGED    Details   amlodipine (NORVASC) 10 MG tablet Take 1 tablet (10 mg total) by mouth once daily., Starting Mon 10/16/2017, Until Tue 10/16/2018, Normal      aspirin (ECOTRIN) 81 MG EC tablet DO Not take until cleared by Neurosurgery, OTC      lisinopril (PRINIVIL,ZESTRIL) 40 MG tablet Take 1 tablet (40 mg total) by mouth once daily., Starting Mon 10/16/2017, Normal      XARELTO 15 mg Tab DO NOT take until cleared by Neurosurgey, No Print         STOP taking these medications       NASONEX 50 mcg/actuation nasal spray Comments:   Reason for Stopping:         sildenafil (VIAGRA) 100 MG tablet Comments:   Reason for Stopping:             Time spent on the discharge of patient: 30 minutes    Rosa French NP  Department of Hospital Medicine  [facility]  "

## 2017-10-17 NOTE — PLAN OF CARE
Problem: Occupational Therapy Goal  Goal: Occupational Therapy Goal  Goals to be met by: 14 days     Patient will increase functional independence with ADLs by performing:    UE Dressing with Modified [place]. GOAL MET 10/09/17  LE Dressing with Modified [place]. GOAL MET 10/12/17  Grooming while standing with Modified [place]. 10/12/17  Toileting from toilet with Modified [place] for hygiene and clothing management. GOAL MET 10/11/17  Bathing from  shower chair/bench with Modified [place].  Toilet transfer to toilet with Modified [place]. GOAL MET 10/12/17  Pt will demonstrate good dynamic standing balance during functional task lasting 10 minutes GOAL MET 10/09/17          Outcome: Outcome(s) achieved Date Met: 10/17/17    Pt was agreeable to OT and was noted to make progress towards his goals in therapy.  Pt met all OT goals during his stay except for bathing and is discharged to go home today.  He will continue to benefit from skilled OT services in order to assist him with increasing his safety and level of independence with self care and mobility tasks in his home setting via  HHOT.      Key Taylor, OT  10/17/2017

## 2017-10-17 NOTE — NURSING
DISCHARGE INSTRUCTIONS GIVEN AND PATIENT PLUS DAUGHTER UNDERSTANDS.DISCHARGED BY WHEELCHAIR ACCOMPANIED BY NURSE TO LOBBY AND DAUGHTER TO HOME WITH PERSONAL BELONGINGS..

## 2017-10-17 NOTE — PLAN OF CARE
Problem: Patient Care Overview  Goal: Plan of Care Review  Outcome: Outcome(s) achieved Date Met: 10/17/17  FALL PRECAUTIONS MAINTAINED NO INJURIES NOTED.

## 2017-10-17 NOTE — PLAN OF CARE
Problem: Physical Therapy Goal  Goal: Physical Therapy Goal  Goals to be met by: 2 weeks     Patient will increase functional independence with mobility by performin. Supine to sit with [place]. met  2. Sit to supine with [place]. met  3. Sit to stand transfer with [place].  4. Bed to chair transfer with [place].  5. Gait  x 300 feet with Modified [place] with/without an assistive device.  6. Ascend/descend 1 flight of stairs with right Handrails modified independence.  7. Stand for 5 minutes with modified independence while performing a task (playing ladder ball, throwing beach ball). Met (10/5/2017)  8. Lower extremity exercise program x 20 standing exercises with unilateral UE support (hip flexion, hip abduction, heel raises). met  9. Pt will be able to ambulate in all directions x 20 feet to display the ability to change direction of ambulation without loss of balance (supervision).      Outcome: Ongoing (interventions implemented as appropriate)  Goals remain appropriate

## 2017-10-18 ENCOUNTER — PATIENT OUTREACH (OUTPATIENT)
Dept: ADMINISTRATIVE | Facility: CLINIC | Age: 67
End: 2017-10-18

## 2017-10-18 NOTE — PATIENT INSTRUCTIONS
Symptoms of a Stroke     A sudden feeling of weakness on one side of your body may be a sign that you are having a stroke.   During a stroke, blood stops flowing to part of the brain. This can damage areas in the brain that control the rest of the body. Get help right away if any of these symptoms come on suddenly, even if the symptoms dont last.  Know the symptoms of a stroke  · Weakness. You may feel a sudden weakness, tingling, or a loss of feeling on 1 side of your face or body including your arm or leg.   · Vision problems. You may have sudden double vision or trouble seeing in 1 or both eyes.  · Speech problems. You may have sudden trouble talking, slurred speech, or problems understanding others.  · Headache. You may have a sudden, severe headache.  · Movement problems. You may have sudden trouble walking, dizziness, a feeling of spinning, a loss of balance, a feeling of falling, or blackouts.  · Seizure. You may also have a seizure with a large or hemorrhagic stroke.   Remember: If you have any of these symptoms, call 911 and your doctor as soon as possible.  F.A.S.T. is an easy way to remember the signs of a stroke. When you see these signs, you will know that you need to call 911 fast.   F.A.S.T. stands for:  · F is for face drooping - One side of the face is drooping or numb. When the person smiles, the smile is uneven.  · A is for arm weakness - One arm is weak or numb. When the person lifts both arms at the same time, one arm may drift downward.  · S is for speech difficulty - You may notice slurred speech or difficulty speaking. The person can't repeat a simple sentence correctly when asked.  · T is for time to dial 911 - If someone shows any of these symptoms, even if they go away, call 911 immediately. Make note of the time the symptoms first appeared.   Date Last Reviewed: 8/26/2015 © 2000-2017 [employer]. [address] [place] [address], [place] [address]. All rights reserved. This  information is not intended as a substitute for professional medical care. Always follow your healthcare professional's instructions.

## 2017-10-24 DIAGNOSIS — Z86.79 PERSONAL HISTORY OF CARDIOVASCULAR DISORDER: ICD-10-CM

## 2017-10-24 DIAGNOSIS — Z86.711 PERSONAL HISTORY OF PE (PULMONARY EMBOLISM): Primary | ICD-10-CM

## 2017-10-25 ENCOUNTER — HOSPITAL ENCOUNTER (OUTPATIENT)
Dept: CARDIOLOGY | Facility: HOSPITAL | Age: 67
Discharge: HOME | End: 2017-10-25
Attending: NURSE PRACTITIONER
Payer: MEDICARE

## 2017-10-25 DIAGNOSIS — Z86.711 PERSONAL HISTORY OF PE (PULMONARY EMBOLISM): ICD-10-CM

## 2017-10-25 DIAGNOSIS — Z86.79 PERSONAL HISTORY OF CARDIOVASCULAR DISORDER: ICD-10-CM

## 2017-10-25 LAB
DIASTOLIC DYSFUNCTION: NO
ESTIMATED PA SYSTOLIC PRESSURE: 27.8
GLOBAL PERICARDIAL EFFUSION: NORMAL
RETIRED EF AND QEF - SEE NOTES: 55 (ref 55–65)
TRICUSPID VALVE REGURGITATION: NORMAL

## 2017-10-25 PROCEDURE — 93306 TTE W/DOPPLER COMPLETE: CPT | Mod: 26,,, | Performed by: INTERNAL MEDICINE

## 2017-10-25 PROCEDURE — 93306 TTE W/DOPPLER COMPLETE: CPT

## 2017-10-27 ENCOUNTER — HOME CARE VISIT (OUTPATIENT)
Dept: NEUROLOGY | Facility: HOSPITAL | Age: 67
End: 2017-10-27

## 2017-11-06 ENCOUNTER — HOSPITAL ENCOUNTER (OUTPATIENT)
Dept: RADIOLOGY | Facility: HOSPITAL | Age: 67
Discharge: HOME | End: 2017-11-06
Attending: STUDENT IN AN ORGANIZED HEALTH CARE EDUCATION/TRAINING PROGRAM
Payer: MEDICARE

## 2017-11-06 ENCOUNTER — OFFICE VISIT (OUTPATIENT)
Dept: NEUROSURGERY | Facility: CLINIC | Age: 67
End: 2017-11-06
Payer: MEDICARE

## 2017-11-06 VITALS
SYSTOLIC BLOOD PRESSURE: 136 MMHG | WEIGHT: 167.69 LBS | TEMPERATURE: 98 F | HEART RATE: 77 BPM | HEIGHT: 67 IN | DIASTOLIC BLOOD PRESSURE: 68 MMHG | BODY MASS INDEX: 26.32 KG/M2

## 2017-11-06 DIAGNOSIS — I10 ESSENTIAL HYPERTENSION: ICD-10-CM

## 2017-11-06 DIAGNOSIS — I61.4 NONTRAUMATIC INTRACEREBRAL HEMORRHAGE OF CEREBELLUM, UNSPECIFIED LATERALITY: ICD-10-CM

## 2017-11-06 DIAGNOSIS — I61.4 LEFT-SIDED NONTRAUMATIC INTRACEREBRAL HEMORRHAGE OF CEREBELLUM: Primary | ICD-10-CM

## 2017-11-06 PROCEDURE — A9585 GADOBUTROL INJECTION: HCPCS | Performed by: STUDENT IN AN ORGANIZED HEALTH CARE EDUCATION/TRAINING PROGRAM

## 2017-11-06 PROCEDURE — 70553 MRI BRAIN STEM W/O & W/DYE: CPT | Mod: TC

## 2017-11-06 PROCEDURE — 99999 PR PBB SHADOW E&M-EST. PATIENT-LVL III: CPT | Mod: PBBFAC,,, | Performed by: NEUROLOGICAL SURGERY

## 2017-11-06 PROCEDURE — 25500020 PHARM REV CODE 255: Performed by: STUDENT IN AN ORGANIZED HEALTH CARE EDUCATION/TRAINING PROGRAM

## 2017-11-06 PROCEDURE — 70553 MRI BRAIN STEM W/O & W/DYE: CPT | Mod: 26,,, | Performed by: RADIOLOGY

## 2017-11-06 PROCEDURE — 99214 OFFICE O/P EST MOD 30 MIN: CPT | Mod: S$GLB,,, | Performed by: NEUROLOGICAL SURGERY

## 2017-11-06 RX ORDER — GADOBUTROL 604.72 MG/ML
8 INJECTION INTRAVENOUS
Status: COMPLETED | OUTPATIENT
Start: 2017-11-06 | End: 2017-11-06

## 2017-11-06 RX ADMIN — GADOBUTROL 8 ML: 604.72 INJECTION INTRAVENOUS at 01:11

## 2017-11-06 NOTE — PROGRESS NOTES
History & Physical      11/06/2017    Chief Complaint   Patient presents with    Follow-up     IArleen attest that this documentation has been prepared under the direction and in the presence of Paul Arriaga MD.      History of Present Illness:  Otis Salcido is a 67 y.o. 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  Patient had a 2.6 cm acute hemorrhage within the superior aspect of the left cerebellar hemisphere and vermis with surrounding edema and mild associated mass effect upon the fourth ventricle.  No hydrocephalus.  Today, patient states he is doing well without complications. He is controlling his blood pressure and has noticed improvement in his balance, but not to his normal state. Patient is currently in outpatient physical therapy.          Review of patient's allergies indicates:   Allergen Reactions    Iodine and iodide containing products Anaphylaxis       Current Outpatient Prescriptions   Medication Sig Dispense Refill    amlodipine (NORVASC) 10 MG tablet Take 1 tablet (10 mg total) by mouth once daily. 30 tablet 1    aspirin (ECOTRIN) 81 MG EC tablet DO Not take until cleared by Neurosurgery  0    atorvastatin (LIPITOR) 40 MG tablet Take 1 tablet (40 mg total) by mouth every evening. 30 tablet 1    levetiracetam (KEPPRA) 250 MG Tab Take 2 tabs in the Morning and take 1 tab at night 90 tablet 1    lisinopril (PRINIVIL,ZESTRIL) 40 MG tablet Take 1 tablet (40 mg total) by mouth once daily. 30 tablet 1    senna-docusate 8.6-50 mg (PERICOLACE) 8.6-50 mg per tablet Take 1 tablet by mouth daily as needed for Constipation.      XARELTO 15 mg Tab DO NOT take until cleared by Neurosurgey  0     No current facility-administered medications for this visit.        Past Medical History:   Diagnosis Date    Allergy     Colon polyp     benign    Elevated PSA     Glaucoma     High cholesterol     History of blood clots     lungs    Hypertension      "Kidney stone     Prostate cancer        Past Surgical History:   Procedure Laterality Date    COLON SURGERY      CYSTOSCOPY      KIDNEY STONE SURGERY      LITHOTRIPSY      NECK SURGERY      PROSTATE SURGERY         History reviewed. No pertinent family history.    Social History   Substance Use Topics    Smoking status: Current Every Day Smoker     Packs/day: 1.00     Years: 40.00     Types: Cigarettes    Smokeless tobacco: Never Used    Alcohol use Yes      Comment: social        Review of Systems:  Review of Systems   Constitutional: Negative for appetite change.   HENT: Negative for congestion.    Eyes: Negative for discharge.   Respiratory: Negative for apnea.    Cardiovascular: Negative for chest pain.   Gastrointestinal: Negative for abdominal distention.   Endocrine: Negative for cold intolerance.   Genitourinary: Negative for difficulty urinating.   Musculoskeletal: Negative for arthralgias.   Allergic/Immunologic: Negative for environmental allergies.   Neurological: Negative for dizziness, weakness and headaches.   Psychiatric/Behavioral: Negative for agitation.       Vital Signs (Most Recent)  Temp: 98.1 °F (36.7 °C) (11/06/17 1420)  Pulse: 77 (11/06/17 1420)  BP: 136/68 (11/06/17 1420)  5' 7" (1.702 m)  76.1 kg (167 lb 11.2 oz)       Physical Exam:  Physical Exam:    Constitutional: He appears well-developed.     Eyes: Pupils are equal, round, and reactive to light. EOM are normal. Right eye exhibits no discharge. Left eye exhibits no discharge.     Abdominal: Soft.     Skin: Skin displays no rash on trunk and no rash on extremities.     Psych/Behavior: He is alert. He is oriented to person, place, and time.     Musculoskeletal: Gait is normal.        Neck: There is no tenderness.        Back: Range of motion is full.        Right Upper Extremities: Range of motion is full. Muscle strength is 5/5. Tone is normal.        Left Upper Extremities: Range of motion is full. Muscle strength is 5/5. " Tone is normal.       Right Lower Extremities: Range of motion is full. Muscle strength is 5/5. Tone is normal.        Left Lower Extremities: Range of motion is full. Muscle strength is 5/5. Tone is normal.     Neurological:        Coordination: He has an abnormal Romberg Test and abnormal tandem walking coordination.        Sensory: There is no sensory deficit in the trunk. There is no sensory deficit in the extremities.        DTRs: DTRs are DTRS NORMAL AND SYMMETRICnormal and symmetric. Tricep reflexes are 2+ on the right side and 2+ on the left side. Bicep reflexes are 2+ on the right side and 2+ on the left side. Brachioradialis reflexes are 2+ on the right side and 2+ on the left side. Patellar reflexes are 2+ on the right side and 2+ on the left side. Achilles reflexes are 2+ on the right side and 2+ on the left side. He displays no Babinski's sign on the right side. He displays no Babinski's sign on the left side.        Cranial nerves: Cranial nerve(s) II, III, IV, V, VI, VII, VIII, IX, X, XI and XII are intact.     Left-sided dysmetria.      Laboratory  CBC: Reviewed  BMP: Reviewed    Diagnostic Results:  MRI: Reviewed   MRI Brain W WO Contrast, dated 11/6/2017, I have personally reviewed and explained the images with the patient:    Subcentimeter diffusion hyperintensity and enhancement right subinsular white matter concerning for subacute aged infarction. There is superimposed remote left PCA territory infarction and prior lacunar-type infarcts bilateral thalami and basal ganglia.    Continued generalized volume loss with scattered foci of  T2/FLAIR signal abnormality and supratentorial white matter and sebastian while nonspecific suggest chronic microvascular ischemic change.    Evolving late subacute age left paramedian superior cerebellar parenchymal hemorrhage. No evidence for new hemorrhage.    No evidence for acute infarction       ASSESSMENT/PLAN:       ICD-10-CM ICD-9-CM   1. Left-sided nontraumatic  intracerebral hemorrhage of cerebellum I61.4 431   2. Essential hypertension I10 401.9       PLAN:    1. Mr. Salcido recovering from a cerebellar hemorrhagic stroke. He just had a MRI of the brain with and without contrast which showed resolving hemorrhage. Plan will be to continue physical therapy and blood pressure control.     2. Will follow-up in 4 months with a MRI of the head with and without contrast to make sure that the hemorrhage is completely gone, and that there is not any underlying tumor.    3. I spent 35 minutes with the patient, 50% of time in counselingabout the above mentioned issues.        Otis was seen today for follow-up.    Diagnoses and all orders for this visit:    Left-sided nontraumatic intracerebral hemorrhage of cerebellum    Essential hypertension      I, Dr. Paul Arriaga, personally performed the services described in this documentation. All medical record entries made by the scribe were at my direction and in my presence.  I have reviewed the chart and agree that the record reflects my personal performance and is accurate and complete. Paul Arriaga MD.  6:16 PM 11/06/2017

## 2017-11-10 ENCOUNTER — TELEPHONE (OUTPATIENT)
Dept: NEUROSURGERY | Facility: CLINIC | Age: 67
End: 2017-11-10

## 2017-11-10 DIAGNOSIS — I61.4 LEFT-SIDED NONTRAUMATIC INTRACEREBRAL HEMORRHAGE OF CEREBELLUM: Primary | ICD-10-CM

## 2017-11-14 ENCOUNTER — TELEPHONE (OUTPATIENT)
Dept: SMOKING CESSATION | Facility: CLINIC | Age: 67
End: 2017-11-14

## 2017-11-14 VITALS
BODY MASS INDEX: 25.9 KG/M2 | DIASTOLIC BLOOD PRESSURE: 70 MMHG | SYSTOLIC BLOOD PRESSURE: 122 MMHG | WEIGHT: 165.38 LBS | OXYGEN SATURATION: 97 % | HEART RATE: 88 BPM

## 2017-11-14 NOTE — TELEPHONE ENCOUNTER
Unsuccessful contact with patient regarding missed intake appointment today. Unable to leave message; mailbox was full.

## 2017-11-15 NOTE — PROGRESS NOTES
Mr. Salcido VS are WNL on today's visit. No family or personal history of stroke. PMH of smoker, HTN, and HLP. Occasional ETOH use. He lives alone and receives HH therapy. LHE educated patient on stroke RF, SM program purpose, importance of medication compliance, and behavioral modifications needed to address stroke RF.

## 2017-11-27 ENCOUNTER — HOME CARE VISIT (OUTPATIENT)
Dept: NEUROLOGY | Facility: HOSPITAL | Age: 67
End: 2017-11-27

## 2017-12-03 VITALS
SYSTOLIC BLOOD PRESSURE: 126 MMHG | DIASTOLIC BLOOD PRESSURE: 82 MMHG | BODY MASS INDEX: 25.86 KG/M2 | OXYGEN SATURATION: 98 % | WEIGHT: 165.13 LBS | HEART RATE: 74 BPM

## 2017-12-03 NOTE — PROGRESS NOTES
Mr. Salcido VS are WNL on today's visit. He was asleep when SM team arrived at the patients home. He denies any hospital stays or ER visits. Patient had questions about Keppra medications, SM nurse educated him on the medications and importance of him taking it as prescribed. LHE educated him on proper hydration.

## 2017-12-18 DIAGNOSIS — C61 PROSTATE CANCER: Primary | ICD-10-CM

## 2017-12-18 DIAGNOSIS — Z00.6 EXAMINATION OF PARTICIPANT IN CLINICAL TRIAL: ICD-10-CM

## 2017-12-26 ENCOUNTER — HOME CARE VISIT (OUTPATIENT)
Dept: NEUROLOGY | Facility: HOSPITAL | Age: 67
End: 2017-12-26

## 2018-01-10 NOTE — PROGRESS NOTES
contacted patient multiple times via telephone to schedule monthly visit no answer messages left. No return call on scheduled day for monthly visit. Will re access in 1 month.

## 2019-06-10 NOTE — PHYSICIAN QUERY
PT Name: Otis Salcido  MR #: [id number]     Physician Query Form - Documentation Clarification      CDS: Mery Ponce RN     Contact information:  [email or web address]    Phone: [phone]    This form is a permanent document in the medical record.     Query Date: October 2, 2017    By submitting this query, we are merely seeking further clarification of documentation. Please utilize your independent clinical judgment when addressing the question(s) below.    The Medical record reflects the following:    Supporting Clinical Findings Location in Medical Record          K+ = 3.4         potassium chloride CR capsule Dose: 30 mEq Freq: Once Route: Oral        Lab 10/1/17     MAR: 10/01/17 0817                                                                                            Doctor, Please specify diagnosis or diagnoses associated with above clinical findings.    Provider Use Only              [ x ] Hypokalemia            [  ] Other ______________                                                                                                                       [  ] Clinically undetermined            
Speaking Coherently

## 2021-11-12 NOTE — PLAN OF CARE
GI Procedure H&P      Procedure Date:  2021    Patient: Subhash Benz  : 1942    Sedation: MAC    Outpatient History and Physical Review for Colonoscopy    Indications: Anemia      Current Outpatient Medications   Medication Sig Dispense Refill   • montelukast (SINGULAIR) 10 MG tablet Take 10 mg by mouth nightly.     • metoPROLOL succinate (TOPROL-XL) 25 MG 24 hr tablet Take 25 mg by mouth daily.     • loratadine (CLARITIN) 10 MG tablet Take 10 mg by mouth daily.     • Dexlansoprazole 60 MG capsule Take 60 mg by mouth daily.     • ferrous sulfate 325 (65 FE) MG tablet Take 325 mg by mouth daily (with breakfast).     • losartan (COZAAR) 50 MG tablet Take 50 mg by mouth daily.     • levocetirizine (XYZAL) 5 MG tablet Take 5 mg by mouth every evening.     • budesonide-formoterol (SYMBICORT) 160-4.5 MCG/ACT inhaler Inhale 2 puffs into the lungs 2 times daily.     • famotidine (PEPCID) 20 MG tablet Take 20 mg by mouth daily.     • cyanocobalamin (VITAMIN B-12) 250 MCG tablet Take 250 mcg by mouth daily.     • Cholecalciferol (VITAMIN D3) 2000 units capsule Take 2,000 Units by mouth daily.     • Insulin Lispro (HUMALOG SC)      • furosemide (LASIX) 20 MG tablet Take 20 mg by mouth daily.     • tamsulosin (FLOMAX) 0.4 MG Cap Take 0.4 mg by mouth daily after a meal.       Current Facility-Administered Medications   Medication Dose Route Frequency Provider Last Rate Last Admin   • fentaNYL (SUBLIMAZE) injection 25 mcg  25 mcg Intravenous Q5 Min PRN Filiberto Castillo MD       • HYDROmorphone (DILAUDID) injection 0.2 mg  0.2 mg Intravenous Q5 Min PRN Filiberto Castillo MD       • HYDROmorphone (DILAUDID) injection 0.4 mg  0.4 mg Intravenous Q5 Min PRN Filiberto Castillo MD       • meperidine (DEMEROL) injection 25 mg  25 mg Intravenous Q10 Min PRN Filiberto Castillo MD           ALLERGIES:  Atorvastatin and Penicillins            Past Medical History:   Diagnosis Date   • Arthritis    • BPH (benign  2:41 PM   SW arranged transport with [facility] for patient to transfer to [facility]. [facility] will be here by 3:45 PM to transfer patient. Report can be called to [phone]. RN Petty hallman.     Sandie Earl LMSW  [facility]  [address]. [phone]     prostatic hyperplasia)    • Cerebral infarction (CMS/HCC)    • CHF (congestive heart failure) (CMS/HCC)    • Chronic kidney disease    • Diabetes mellitus (CMS/HCC)    • Essential (primary) hypertension    • Glaucoma    • High cholesterol    • RAD (reactive airway disease)    • Sleep apnea    • Urinary tract infection      @SURGICALHX      Review of Systems:   Eye Problem(s): no visual disturbance, no redness  ENT Problem(s): no sore throat or hoarseness  Cardiovascular problem(s): no chest pain or palpitations  Respiratory problem(s): no SOB or wheezing  Gastro-intestinal problem(s): See HPI  Genito-urinary problem(s): no dysuria or hematuria  Musculoskeletal problem(s): no joint pain or swelling  Integumentary problem(s): no rashes or itching  Neurological problem(s): no difficulty holding objects or with balance  Psychiatric problem(s): no depression or anxiety  Endocrine problem(s): no increased reflexes or flushing  Hematologic and/or Lymphatic problem(s): no easy bruising or bleeding        PHYSICAL EXAM:  MENTAL STATUS: Alert, oriented x3, interactive.  Eyes: Anicteric  HEENT: Within normal limits  Neck: Supple  GI: Abdomen soft, nontender, nondistended  NEUROLOGICAL: Within normal limits.  SKIN: Warm, dry and intact, no lesions or rashes.       The risks of the procedure including the possibility of bleeding, perforation (possibly resulting in laparotomy/colostomy) and aspiration were discussed with the patient who accepts these risks.    DO Reji Rebolledo MD

## 2023-04-27 NOTE — PT/OT/SLP PROGRESS
"Occupational Therapy  Treatment    Otis Salcido   MRN: [id number]   Admitting Diagnosis: Nontraumatic intracerebral hemorrhage of cerebellum    OT Date of Treatment: 10/11/17  Total Time (min): 47 min    Billable Minutes:  Neuromuscular Re-education 47    General Precautions: Standard, aspiration, fall  Orthopedic Precautions: N/A  Braces: N/A         Subjective:  Communicated with nurse prior to session.  "I can get better at home too." pt making many comments related to being able to do therapy at home.  When asked, pt acknowledged that he wanted to go home. Pt states he has many family members that can check in on him    Pain/Comfort  Pain Rating 1: 0/10  Pain Rating Post-Intervention 1: 0/10    Objective:  Patient found with:  (No lines - sitting in bedside chair)    Functional Status:  MDS G  Transfer Functional Status: S-SBA  Transfer Level of (A):  (S for sit to stand transfers and toilet transfers with RW)    Walk in Room Functional Status: S-SBA  Walk In Room Level of (A):  (S using RW)    Walk in Corridor Functional Status: S-SBA  Walk In Corridor Level of (A):  (SBA using RW - pt walked from room to gym)    Toilet Use Functional Status: mod(I) - (I)  Toilet Use Level of (A) :  (Mod I to complete toileting while sitting on commode)    Personal Hygiene Functional Status: S-SBA  Personal Hygiene Level of (A) :  (S to stand and wash hands - RW at side)    Moving from seated to standing position: Not steady, but able to stabilize without staff assistance  Walking (with assistive device if used): Not steady, but able to stabilize without staff assistance  Turning around and facing the opposite direction while walking: Not steady, but able to stabilize without staff assistance  Moving on and off the toilet: Not steady, but able to stabilize without staff assistance  Surface-to-surface transfer (transfer between bed and chair or wheelchair): Not steady, but able to stabilize without staff assistance      "       Additional Treatment:  · Pt continues to demonstrate ataxic/apraxic like movements of L UE which impede his functional independence during FM tasks or bimanual tasks during ADLs (fastening buttons, grooming in stand, etc), iADLs (cooking, grocery shopping, managing keys) and func mobility (manipulating RW).  OT worked with pt on activities to regulate and improve abnormal movements and coordination of L UE:  ·  WB on B UEs against wall - pt performed 3 sets of 10 reps of wall pushups  · Pt performed bimanual tasks using 6# weighted medicine ball - all tasks were performed for 2 sets of 12 reps: pt alternated passing ball from R <->L hand using pronation/supination with elbow in flexion, pushing ball forward with elbow extended and back to chest, and flexing B shoulder with elbows extended to bring ball over head.  - pt noted with gradual decline in oscillations with reps. Also noted with improvement when cued to locks his muscled in position for isometric hold.    · Using 3# wrist weight, pt worked on reaching for items from above head to waist level while standing at a table.  Pt noted with most isolations when shoulder flexed past 120 degrees - pt given cues to tightly grasp items in his hand which resulted in some decrease of oscillations.   · Pt educated on continuing exercises throughout the day, focusing on isometric movements of L UE to decrease oscillations.  Pt stated he did some yesterday after his session.       Patient left with PT for his next therapy session.     ASSESSMENT:  Otis Salcido is a 67 y.o. male with a medical diagnosis of Nontraumatic intracerebral hemorrhage of cerebellum.  Pt was agreeable to OT and was noted to make progress towards his goals in therapy.  During today's session, pt met goal for toileting.   Pt's goals remain appropriate at this time.  Pt's main barrier towards independence is his decreased balance and poor motor control of his L UE.  Pt is working towards  gaining control over his UE movements in order to improve his safety and function during daily functional tasks. He will continue to benefit from skilled OT services in order to assist him with increasing his safety and level of independence with self care and mobility tasks.         Rehab identified problem list/impairments: weakness, impaired endurance, impaired sensation, impaired self care skills, impaired functional mobilty, gait instability, impaired balance, decreased coordination, decreased upper extremity function, decreased lower extremity function, decreased safety awareness, impaired coordination, impaired fine motor    Rehab potential is good    Activity tolerance: Good    Discharge recommendations: home health OT     Barriers to discharge: Decreased caregiver support     Equipment recommendations: bedside commode, bath bench     GOALS:    Occupational Therapy Goals        Problem: Occupational Therapy Goal    Goal Priority Disciplines Outcome Interventions   Occupational Therapy Goal     OT, PT/OT Ongoing (interventions implemented as appropriate)    Description:  Goals to be met by: 14 days     Patient will increase functional independence with ADLs by performing:    UE Dressing with Modified [place]. GOAL MET 10/09/17  LE Dressing with Modified [place].  Grooming while standing with Modified [place].  Toileting from toilet with Modified [place] for hygiene and clothing management. GOAL MET 10/11/17  Bathing from  shower chair/bench with Modified [place].  Toilet transfer to toilet with Modified [place].  Pt will demonstrate good dynamic standing balance during functional task lasting 10 minutes GOAL MET 10/09/17                          Plan:  Patient to be seen  (5-6x/week) to address the above listed problems via self-care/home management, therapeutic activities, therapeutic exercises, neuromuscular re-education  Plan of Care expires: 11/03/17  Plan of Care reviewed  with: patient    Key TONO Brandon, OT  10/11/2017   4 = No assist / stand by assistance

## 2023-04-27 NOTE — PT/OT/SLP PROGRESS
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  Pt agreeable to participate with therapy this date.   Pain/Comfort  Pain Rating 1: 0/10  Pain Rating Post-Intervention 1: 0/10    Objective:  Patient found with: telemetry, peripheral IV     Functional Mobility:  Bed Mobility:  Rolling/Turning to Left:  (Pt sitting up on couch upon entering room)    Transfers:   Sit <> Stand Assistance: Stand By Assistance (from couch )  Sit <> Stand Assistive Device: No Assistive Device  Toilet Transfer Technique: Stand Pivot  Toilet Transfer Assistance: Stand By Assistance  Toilet Transfer Assistive Device: grab bar    Functional Ambulation: Pt completed short functional house hold distance with CGA- Min A for dynamic balance. He demo'd one LOB however was able to self correct and no SOB.     Activities of Daily Living:    UE Dressing Level of Assistance: Minimum assistance (to mervat gown like jacket while standing-- demo's difficulty with tie completion)    LE Dressing Level of Assistance: Stand by assistance (to mervat B sock while seated on couch)    Grooming Position: Standing at sink (to wash hands)  Grooming Level of Assistance: Stand by assistance      Balance:   Static Sit: GOOD: Takes MODERATE challenges from all directions  Dynamic Sit: GOOD-: Maintains balance through MODERATE excursions of active trunk movement,     Static Stand: FAIR+: Takes MINIMAL challenges from all directions  Dynamic stand: FAIR: Needs CONTACT GUARD  "during gait    Therapeutic Activities:   Pt edu on OT role/POC  Importance of OOB activity with staff assistance  Safety during functional t/f and mobility  ADL tasks completed throughout treatment session-- assistance noted above  Functional mobility performed short house hold distance with CGA-min A   Communication board updated    AM-PAC 6 CLICK ADL   How much help from another person does this patient currently need?   1 = Unable, Total/Dependent Assistance  2 = A lot, Maximum/Moderate Assistance  3 = A little, Minimum/Contact Guard/Supervision  4 = None, Modified [place]/Independent    Putting on and taking off regular lower body clothing? : 3  Bathing (including washing, rinsing, drying)?: 3  Toileting, which includes using toilet, bedpan, or urinal? : 3  Putting on and taking off regular upper body clothing?: 3  Taking care of personal grooming such as brushing teeth?: 4  Eating meals?: 3  Total Score: 19     AM-PAC Raw Score CMS "G-Code Modifier Level of Impairment Assistance   6 % Total / Unable   7 - 8 CM 80 - 100% Maximal Assist   9-13 CL 60 - 80% Moderate Assist   14 - 19 CK 40 - 60% Moderate Assist   20 - 22 CJ 20 - 40% Minimal Assist   23 CI 1-20% SBA / CGA   24 CH 0% Independent/ Mod I       Patient left sitting up on couch with all lines intact, call button in reach and RN notified    ASSESSMENT:  Otis Salcido is a 67 y.o. 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 Pt would benefit from continued skilled OT to address deficits listed below and maximize return to PLOF. OT recommending rehab following d/c to increase overall independence and safety with ADL.    Rehab identified problem list/impairments: Rehab identified problem list/impairments: weakness, impaired endurance, impaired self care skills, impaired functional mobilty, impaired balance, decreased upper extremity function, decreased lower " [Time Spent: ___ minutes] : I have spent [unfilled] minutes of time on the encounter. extremity function, decreased safety awareness, gait instability, impaired fine motor    Rehab potential is good.    Activity tolerance: Good    Discharge recommendations: Discharge Facility/Level Of Care Needs: nursing facility, skilled     Barriers to discharge: Barriers to Discharge: Inaccessible home environment, Decreased caregiver support    Equipment recommendations:  (TBD)     GOALS:    Occupational Therapy Goals        Problem: Occupational Therapy Goal    Goal Priority Disciplines Outcome Interventions   Occupational Therapy Goal     OT, PT/OT Ongoing (interventions implemented as appropriate)    Description:  Goals achieved for transfers, lower body dressing and toileting.  Revised goals set 9/30 to be addressed for 7 days with expiration date, 10/7:  Patient will increase functional independence with ADLs by performing:    Patient will demonstrate rolling to the right with modified independence.  Not met   Patient will demonstrate rolling to the left with modified independence.   Not met  Patient will demonstrate supine -sit with modified independence.   Not met  Patient will demonstrate stand pivot transfers with supervision.   Not met  Patient will demonstrate grooming while standing with modified independence.   Not met  Patient will demonstrate upper body dressing with supervision, including gathering clothing. Not met  Patient will demonstrate lower body dressing with supervision.   Not met  Patient will demonstrate toileting with supervision.   Not met  Patient's family / caregiver will demonstrate independence and safety with assisting patient with self-care skills and functional mobility.     Not met  Patient and/or patient's family will verbalize understanding of stroke prevention guidelines, personal risk factors and stroke warning signs via teachback method.  Not met                            Plan:  Patient to be seen 6 x/week to address the above listed problems via self-care/home  management, therapeutic activities, therapeutic exercises, neuromuscular re-education  Plan of Care expires: 10/26/17  Plan of Care reviewed with: patient         Marivel epstein, OT  10/03/2017

## 2024-02-08 NOTE — PROGRESS NOTES
Traveled with patient and PCT to MRI, MRI Completed.  Patient returned to room and placed on in room monitoring.  Patient Tolerated well. Will continue to monitor.   How Severe Is Your Acne?: moderate Is This A New Presentation, Or A Follow-Up?: Acne What Type Of Note Output Would You Prefer (Optional)?: Standard Output

## 2024-06-03 NOTE — TREATMENT PLAN
Rehab Services' DME recommendations    Otis BAKER Omari  MRN: [id number]    [x]  No DME needed    [x] Home health OT and PT (transition to OP therapy)    Key Taylor, OT 10/15/2017        
No